# Patient Record
Sex: FEMALE | Race: WHITE | NOT HISPANIC OR LATINO | Employment: OTHER | URBAN - METROPOLITAN AREA
[De-identification: names, ages, dates, MRNs, and addresses within clinical notes are randomized per-mention and may not be internally consistent; named-entity substitution may affect disease eponyms.]

---

## 2017-04-05 ENCOUNTER — GENERIC CONVERSION - ENCOUNTER (OUTPATIENT)
Dept: OTHER | Facility: OTHER | Age: 67
End: 2017-04-05

## 2017-04-28 ENCOUNTER — TRANSCRIBE ORDERS (OUTPATIENT)
Dept: ADMINISTRATIVE | Facility: HOSPITAL | Age: 67
End: 2017-04-28

## 2017-04-28 DIAGNOSIS — Z12.31 VISIT FOR SCREENING MAMMOGRAM: Primary | ICD-10-CM

## 2017-05-12 ENCOUNTER — GENERIC CONVERSION - ENCOUNTER (OUTPATIENT)
Dept: OTHER | Facility: OTHER | Age: 67
End: 2017-05-12

## 2017-05-26 ENCOUNTER — ALLSCRIPTS OFFICE VISIT (OUTPATIENT)
Dept: OTHER | Facility: OTHER | Age: 67
End: 2017-05-26

## 2017-05-26 DIAGNOSIS — Z78.0 ASYMPTOMATIC MENOPAUSAL STATE: ICD-10-CM

## 2017-05-26 DIAGNOSIS — Z12.31 ENCOUNTER FOR SCREENING MAMMOGRAM FOR MALIGNANT NEOPLASM OF BREAST: ICD-10-CM

## 2017-06-08 ENCOUNTER — HOSPITAL ENCOUNTER (OUTPATIENT)
Dept: MAMMOGRAPHY | Facility: CLINIC | Age: 67
Discharge: HOME | End: 2017-06-08
Payer: COMMERCIAL

## 2017-06-08 DIAGNOSIS — Z12.31 ENCOUNTER FOR SCREENING MAMMOGRAM FOR MALIGNANT NEOPLASM OF BREAST: ICD-10-CM

## 2017-06-08 PROCEDURE — G0202 SCR MAMMO BI INCL CAD: HCPCS

## 2017-06-09 ENCOUNTER — GENERIC CONVERSION - ENCOUNTER (OUTPATIENT)
Dept: OTHER | Facility: OTHER | Age: 67
End: 2017-06-09

## 2017-08-14 ENCOUNTER — ALLSCRIPTS OFFICE VISIT (OUTPATIENT)
Dept: OTHER | Facility: OTHER | Age: 67
End: 2017-08-14

## 2017-09-30 ENCOUNTER — ALLSCRIPTS OFFICE VISIT (OUTPATIENT)
Dept: OTHER | Facility: OTHER | Age: 67
End: 2017-09-30

## 2017-10-02 ENCOUNTER — GENERIC CONVERSION - ENCOUNTER (OUTPATIENT)
Dept: OTHER | Facility: OTHER | Age: 67
End: 2017-10-02

## 2017-10-02 NOTE — PROGRESS NOTES
Assessment  1  Cellulitis of hand (682 4) (L03 119)   2  Cat bite, initial encounter (879 8,E906 3) (W55 01XA)   3  Never smoked cigarettes (V49 89) (Z78 9)    Plan  Cat bite, initial encounter    · Amoxicillin-Pot Clavulanate 875-125 MG Oral Tablet; TAKE 1 TABLET EVERY 12  HOURS DAILY    Discussion/Summary    1) cat bite with cellulitis right hand: start augmentin 1 tab twice a dayibuprofen 2 tabs every 4-6 hours as needed for swelling fluidsto ED for iv antibiotics if no improvement or worsening  Possible side effects of new medications were reviewed with the patient/guardian today  The treatment plan was reviewed with the patient/guardian  The patient/guardian understands and agrees with the treatment plan      Chief Complaint  Pt here with cat bite yesterday at 8 am  She washed it out, put alcohol on it, iced it and now it is a little swollen  This time she experienced feverish, aches, chills, extremely tired and hurts in her axillary gland  History of Present Illness  HPI: cat bite yesterday am  cleaned with alcohol and applied ice  did not look bad before going to bed but woke up this am with increased redness and swelling, no drainage  had fevers,ibuprofen last night - 2 tabs , none today  now with streaking up arm and axillary nodes are sore      Review of Systems    Constitutional: fever, but-as noted in HPI-and-no chills  ENT: no ear ache, no loss of hearing, no nosebleeds or nasal discharge, no sore throat or hoarseness  Cardiovascular: no complaints of slow or fast heart rate, no chest pain, no palpitations, no leg claudication or lower extremity edema  Respiratory: no complaints of shortness of breath, no wheezing, no dyspnea on exertion, no orthopnea or PND  Breasts: no complaints of breast pain, breast lump or nipple discharge  Gastrointestinal: no complaints of abdominal pain, no constipation, no nausea or diarrhea, no vomiting, no bloody stools     Genitourinary: no complaints of dysuria, no incontinence, no pelvic pain, no dysmenorrhea, no vaginal discharge or abnormal vaginal bleeding  Musculoskeletal: no complaints of arthralgia, no myalgia, no joint swelling or stiffness, no limb pain or swelling  Integumentary: rash, but-as noted in HPI  Neurological: no complaints of headache, no confusion, no numbness or tingling, no dizziness or fainting  Active Problems  1  Abnormal mammogram (793 80) (R92 8)   2  Anxiety disorder (300 00) (F41 9)   3  Asymptomatic postmenopausal state (V49 81) (Z78 0)   4  Dysthymic disorder (300 4) (F34 1)   5  Glaucoma suspect, unspecified laterality (365 00) (H40 009)   6  Hiatal hernia (553 3) (K44 9)   7  Hyperlipidemia (272 4) (E78 5)   8  Hypothyroidism (244 9) (E03 9)   9  Impaired fasting glucose (790 21) (R73 01)   10  Obstructive sleep apnea (327 23) (G47 33)   11  Other age-related incipient cataract (366 12) (H25 099)   12  Pain of left heel (729 5) (M79 672)   13  Pulmonary nodule seen on imaging study (793 11) (R91 1)   14  Raynaud's syndrome without gangrene (443 0) (I73 00)   15  Reactive airway disease (493 90) (J45 909)   16  Snoring (786 09) (R06 83)   17  Urticaria (708 9) (L50 9)    Past Medical History  1  History of Accelerated essential hypertension (401 0) (I10)   2  History of Bronchitis, asthmatic (493 90) (J45 909)   3  History of Cat bite (879 8,E906 3) (W55 01XA)   4  History of Cough (786 2) (R05)   5  History of Difficulty breathing (786 09) (R06 89)   6  History of Foot Pain (Soft Tissue) (729 5)   7  History of abdominal pain (V13 89) (Z87 898)   8  History of diarrhea (V12 79) (Z87 898)   9  History of labyrinthitis (V12 49) (Z86 69)   10  History of lymphadenopathy (V13 89) (Z87 898)   11  History of pleurisy (V12 69) (Z87 09)   12  History of rheumatic fever (V12 09) (Z86 79)   13  History of Lumbar Strain (847 2)   14  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)   15   History of Pain in joint of right shoulder region (719 41) (M25 511)   16  History of Pain in left foot (729 5) (M84 342)   17  History of Routine Gynecological Exam With Cervical Pap Smear (V72 31)   18  History of Sore throat (462) (J02 9)   19  History of Wound infection (958 3) (T14 8XXA,L08 9)  Active Problems And Past Medical History Reviewed: The active problems and past medical history were reviewed and updated today  Family History  Mother    1  Family history of Lung Cancer (V16 1)   2  Family history of Mother  At Age 64  Father    3  Family history of Carcinoma Of The Stomach (V16 0)   4  Family history of Father  At Age 80  Maternal Grandmother    5  Family history of Stroke Syndrome (V17 1)  Maternal Aunt    6  Family history of Cardiomegaly  Family History    7  Family history of Brother  At Age ___  Family History Reviewed: The family history was reviewed and updated today  Social History   · Alcohol Use (History)   · Social usage, 3-4 beers a wk and 1 cocktail every 2wks  · Daily Coffee Consumption (1  Cups/Day)   · Denied: History of Drug Use   · Never smoked cigarettes (V49 89) (Z78 9)   · Patient has living will (V49 89) (Z78 9)   · Uses Safety Equipment - Seatbelts   · 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  The social history was reviewed and updated today  The social history was reviewed and is unchanged  Surgical History  1  History of Hallux Valgus (Bunion) Correction   2  History of Hand Incision Tendon Sheath Of A Finger   3  History of Knee Arthroscopy With Medial Meniscus Repair  Surgical History Reviewed: The surgical history was reviewed and updated today  Current Meds   1  ALPRAZolam 1 MG Oral Tablet; TAKE ONE-HALF TO ONE TABLET BY MOUTH UP TO 4   TIMES DAILY; Therapy: 21Zlp2621 to (Evaluate:71Sph6466)  Requested for: 90KRJ9868; Last   Rx:57Owf0466 Ordered   2   AmLODIPine Besylate 5 MG Oral Tablet; TAKE ONE (1) TABLET(S) DAILY; Therapy: 39XQW5031 to (Evaluate:21May2018)  Requested for: 81GAY6326; Last   Rx:26May2017 Ordered   3  Aspirin 81 MG Oral Tablet Chewable; CHEW AND SWALLOW 1 TABLET DAILY; Therapy: (Recorded:19Apr2012) to Recorded   4  Citalopram Hydrobromide 40 MG Oral Tablet; take one tablet by mouth daily; Therapy: 33Jzz2372 to (Evaluate:08Oct2017)  Requested for: 02YTS3742; Last   Rx:02Yod5120 Ordered   5  Diphenoxylate-Atropine 2 5-0 025 MG Oral Tablet; TAKE 1 TABLET DAILY AS NEEDED; Therapy: 19Apr2012 to (Evaluate:15Kml4820); Last Rx:26May2017 Ordered   6  Geritol Complete Oral Tablet; TAKE 1 TABLET DAILY AS DIRECTED; Therapy: [phone]) to Recorded   7  Levothyroxine Sodium 25 MCG Oral Tablet; TAKE ONE TABLET BY MOUTH ONCE   DAILY; Therapy: 62HWX9133 to (Evaluate:21May2018)  Requested for: 37OAZ9906; Last   Rx:26May2017 Ordered   8  Omeprazole 20 MG Oral Capsule Delayed Release; TAKE ONE CAPSULE BY MOUTH   EVERY DAY; Therapy: 91VXQ5453 to (Evaluate:16Mar2018)  Requested for: 14XUD4705; Last   Rx:19Jun2017 Ordered   9  Ventolin  (90 Base) MCG/ACT Inhalation Aerosol Solution; INHALE 2 PUFFS BY   MOUTH FOUR TIMES A DAY AS NEEDED FOR SHORTNESS OF BREATH; Therapy: 81GXI3979 to (Teddy Cardona)  Requested for: 27IXN7929; Last   Rx:07Jun2017 Ordered    The medication list was reviewed and updated today  Allergies  1  Pneumovax 23 INJ  2  Other    Vitals   Recorded: 99XZN7267 09:07AM   Temperature 98 8 F   Heart Rate 75   Systolic 966   Diastolic 74   Height 5 ft 2 in   Weight 168 lb    BMI Calculated 30 73   BSA Calculated 1 77   O2 Saturation 98     Physical Exam    Constitutional   General appearance: No acute distress, well appearing and well nourished  Pulmonary   Respiratory effort: No increased work of breathing or signs of respiratory distress  Auscultation of lungs: Clear to auscultation      Cardiovascular   Auscultation of heart: Normal rate and rhythm, normal S1 and S2, without murmurs  Skin   Skin and subcutaneous tissue: Abnormal  -2 puncture wounds right hand volar aspect over 2nd metacarpal and web of thumb- erythema and edema with slight red streak up right forearm  Neurologic   Reflexes: 2+ and symmetric  Additional Exam:  tenderness right axilla          Signatures   Electronically signed by : Farhad Medel DO; Oct  1 2017  9:48PM EST                       (Author)

## 2017-10-12 ENCOUNTER — ALLSCRIPTS OFFICE VISIT (OUTPATIENT)
Dept: OTHER | Facility: OTHER | Age: 67
End: 2017-10-12

## 2017-10-12 DIAGNOSIS — M79.671 PAIN OF RIGHT FOOT: ICD-10-CM

## 2017-10-12 DIAGNOSIS — M79.674 PAIN OF TOE OF RIGHT FOOT: ICD-10-CM

## 2017-10-13 NOTE — PROGRESS NOTES
Assessment  1  Pain of right great toe (729 5) (M79 674)   2  Right foot pain (729 5) (M79 671)    Plan  Pain of right great toe, Right foot pain    · * XR FOOT 3+ VIEW RIGHT; Status:Active; Requested for:12Oct2017; The patient actually has minimal pain but she is concerned because the swelling and bruising moved into her foot  At this point I do still think this is mainly related to the great toe and she can continue to cassy tape for comfort  I am going to give her a prescription for an x-ray, especially to be sure there is no break in the foot as opposed to the toe  We discussed that if there is some thing in the foot she would need to follow up with her podiatrist, she does have a podiatrist that is currently treating her for plantar fasciitis  We discussed that she could either go and get the x-ray now or she could give this a couple days to see if it improves on its own  Discussion/Summary  Possible side effects of new medications were reviewed with the patient/guardian today  The treatment plan was reviewed with the patient/guardian  The patient/guardian understands and agrees with the treatment plan      Chief Complaint  PT C/O R GREAT TOE BEING SWOLLEN AN BLACK AN BLUE ON TUESDAY  THE SWELLING GOES BACK INTO THE FOOT  History of Present Illness  HPI: The patient is here today complaining of pain in her toedropped a can of soda on it yesterday and almost immediately after this happened her dog stepped on it called yesterday to see if we would just recommend cassy taping it and we did at the timecalled again this morning because now it is hurting even morehas turned black and blue it is swollen       Active Problems  1  Abnormal mammogram (793 80) (R92 8)   2  Anxiety disorder (300 00) (F41 9)   3  Asymptomatic postmenopausal state (V49 81) (Z78 0)   4  Cat bite, initial encounter (879 8,E906 3) (W55 01XA)   5  Cellulitis of hand (682 4) (L03 119)   6  Dysthymic disorder (300 4) (F34 1)   7  Glaucoma suspect, unspecified laterality (365 00) (H40 009)   8  Hiatal hernia (553 3) (K44 9)   9  Hyperlipidemia (272 4) (E78 5)   10  Hypothyroidism (244 9) (E03 9)   11  Impaired fasting glucose (790 21) (R73 01)   12  Obstructive sleep apnea (327 23) (G47 33)   13  Other age-related incipient cataract (366 12) (H25 099)   14  Pain of left heel (729 5) (M79 672)   15  Pulmonary nodule seen on imaging study (793 11) (R91 1)   16  Raynaud's syndrome without gangrene (443 0) (I73 00)   17  Reactive airway disease (493 90) (J45 909)   18  Snoring (786 09) (R06 83)   19  Urticaria (708 9) (L50 9)    Past Medical History  1  History of Accelerated essential hypertension (401 0) (I10)   2  History of Bronchitis, asthmatic (493 90) (J45 909)   3  History of Cat bite (879 8,E906 3) (W55 01XA)   4  History of Cough (786 2) (R05)   5  History of Difficulty breathing (786 09) (R06 89)   6  History of Foot Pain (Soft Tissue) (729 5)   7  History of abdominal pain (V13 89) (Z87 898)   8  History of diarrhea (V12 79) (Z87 898)   9  History of labyrinthitis (V12 49) (Z86 69)   10  History of lymphadenopathy (V13 89) (Z87 898)   11  History of pleurisy (V12 69) (Z87 09)   12  History of rheumatic fever (V12 09) (Z86 79)   13  History of Lumbar Strain (847 2)   14  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)   15  History of Pain in joint of right shoulder region (719 41) (M25 511)   16  History of Pain in left foot (729 5) (M79 672)   17  History of Routine Gynecological Exam With Cervical Pap Smear (V72 31)   18  History of Sore throat (462) (J02 9)   19  History of Wound infection (958 3) (T14 8XXA,L08 9)  Active Problems And Past Medical History Reviewed: The active problems and past medical history were reviewed and updated today  Family History  Mother    1  Family history of Lung Cancer (V16 1)   2  Family history of Mother  At Age 64  Father    3   Family history of Carcinoma Of The Stomach (V16 0)   4  Family history of Father  At Age 80  Maternal Grandmother    5  Family history of Stroke Syndrome (V17 1)  Maternal Aunt    6  Family history of Cardiomegaly  Family History    7  Family history of Brother  At Age ___  Family History Reviewed: The family history was reviewed and updated today  Social History   · Alcohol Use (History)   · Social usage, 3-4 beers a wk and 1 cocktail every 2wks  · Daily Coffee Consumption (1  Cups/Day)   · Denied: History of Drug Use   · Never smoked cigarettes (V49 89) (Z78 9)   · Patient has living will (V49 89) (Z78 9)   · Uses Safety Equipment - Seatbelts   · 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  The social history was reviewed and updated today  Surgical History  1  History of Hallux Valgus (Bunion) Correction   2  History of Hand Incision Tendon Sheath Of A Finger   3  History of Knee Arthroscopy With Medial Meniscus Repair  Surgical History Reviewed: The surgical history was reviewed and updated today  Current Meds   1  ALPRAZolam 1 MG Oral Tablet; TAKE ONE-HALF TO ONE TABLET BY MOUTH UP TO 4   TIMES DAILY; Therapy: 97Pvx1695 to (Evaluate:57Hkg8689)  Requested for: 30GFA1902; Last   Rx:14Neg5086 Ordered   2  AmLODIPine Besylate 5 MG Oral Tablet; TAKE ONE (1) TABLET(S) DAILY; Therapy: 47DZM4095 to (Evaluate:72Dcg8172)  Requested for: 48MFD2707; Last   Rx:33Zwl7611 Ordered   3  Amoxicillin-Pot Clavulanate 875-125 MG Oral Tablet; TAKE 1 TABLET EVERY 12   HOURS DAILY; Therapy: 18XQO7894 to (Evaluate:2017); Last Rx:35Dsj6672 Ordered   4  Aspirin 81 MG Oral Tablet Chewable; CHEW AND SWALLOW 1 TABLET DAILY; Therapy: (Recorded:34Rcy6638) to Recorded   5  Citalopram Hydrobromide 40 MG Oral Tablet; take one tablet by mouth daily; Therapy: 03Nhw8957 to (Evaluate:2017)  Requested for: 86BAN9695; Last   Rx:04Fmo1889 Ordered   6   Diphenoxylate-Atropine 2 5-0 025 MG Oral Tablet; TAKE 1 TABLET DAILY AS NEEDED; Therapy: 41Btv3620 to (Evaluate:92Mjw6662); Last Rx:61Kga0670 Ordered   7  Geritol Complete Oral Tablet; TAKE 1 TABLET DAILY AS DIRECTED; Therapy: (Koko Warner) to Recorded   8  Levothyroxine Sodium 25 MCG Oral Tablet; TAKE ONE TABLET BY MOUTH ONCE   DAILY; Therapy: 49UKZ6695 to (Evaluate:04Yxi1786)  Requested for: 58YUX5178; Last   Rx:26May2017 Ordered   9  Omeprazole 20 MG Oral Capsule Delayed Release; TAKE ONE CAPSULE BY MOUTH   EVERY DAY; Therapy: 32YZQ3163 to (Evaluate:16Mar2018)  Requested for: 11ZWH0751; Last   Rx:19Jun2017 Ordered   10  Ventolin  (90 Base) MCG/ACT Inhalation Aerosol Solution; INHALE 2 PUFFS BY    MOUTH FOUR TIMES A DAY AS NEEDED FOR SHORTNESS OF BREATH; Therapy: 25MYW8666 to (Nolon Xu)  Requested for: 18GOT2717; Last    Rx:07Jun2017 Ordered    The medication list was reviewed and updated today  Allergies  1  Pneumovax 23 INJ  2  Other    Vitals   Recorded: 64EFT9118 03:07PM   Temperature 98 5 F   Heart Rate 66   Systolic 340   Diastolic 80   Height 5 ft 2 in   Weight 169 lb 8 oz   BMI Calculated 31   BSA Calculated 1 78   O2 Saturation 96     Physical Exam    Constitutional   General appearance: No acute distress, well appearing and well nourished      Musculoskeletal   Gait and station: Normal     Inspection/palpation of joints, bones, and muscles: Abnormal  -(mild ttp over the right 1st MTP, right great toe and just proximal to the great, 2nd, and 3rd toes on the foot; there is mild edma as well as ecchymosis of these areas as well)   Skin   Skin and subcutaneous tissue: Abnormal  -(as above, eccymosis; also a very small, closed laceration on the right great toe)   Psychiatric   Orientation to person, place, and time: Normal     Mood and affect: Normal          Signatures   Electronically signed by : FLORENTINO Obando ; Oct 12 2017  3:26PM EST                       (Author)

## 2017-10-17 ENCOUNTER — TRANSCRIBE ORDERS (OUTPATIENT)
Dept: ADMINISTRATIVE | Facility: HOSPITAL | Age: 67
End: 2017-10-17

## 2017-10-17 ENCOUNTER — HOSPITAL ENCOUNTER (OUTPATIENT)
Dept: RADIOLOGY | Facility: HOSPITAL | Age: 67
Discharge: HOME | End: 2017-10-17
Attending: FAMILY MEDICINE
Payer: COMMERCIAL

## 2017-10-17 DIAGNOSIS — M79.674 PAIN OF TOE OF RIGHT FOOT: ICD-10-CM

## 2017-10-17 DIAGNOSIS — M79.671 PAIN OF RIGHT FOOT: ICD-10-CM

## 2017-10-17 PROCEDURE — 73630 X-RAY EXAM OF FOOT: CPT

## 2017-10-18 ENCOUNTER — APPOINTMENT (OUTPATIENT)
Dept: RADIOLOGY | Facility: CLINIC | Age: 67
End: 2017-10-18
Payer: COMMERCIAL

## 2017-10-18 ENCOUNTER — TRANSCRIBE ORDERS (OUTPATIENT)
Dept: ADMINISTRATIVE | Facility: HOSPITAL | Age: 67
End: 2017-10-18

## 2017-10-18 DIAGNOSIS — M84.375A STRESS FRACTURE OF LEFT FOOT, INITIAL ENCOUNTER: Primary | ICD-10-CM

## 2017-10-18 DIAGNOSIS — M72.2 BILATERAL PLANTAR FASCIITIS: ICD-10-CM

## 2017-10-18 PROCEDURE — 73650 X-RAY EXAM OF HEEL: CPT

## 2017-10-19 ENCOUNTER — GENERIC CONVERSION - ENCOUNTER (OUTPATIENT)
Dept: OTHER | Facility: OTHER | Age: 67
End: 2017-10-19

## 2017-10-24 ENCOUNTER — HOSPITAL ENCOUNTER (OUTPATIENT)
Dept: MRI IMAGING | Facility: HOSPITAL | Age: 67
Discharge: HOME | End: 2017-10-24
Attending: PODIATRIST
Payer: COMMERCIAL

## 2017-10-24 DIAGNOSIS — M84.375A STRESS FRACTURE OF LEFT FOOT, INITIAL ENCOUNTER: ICD-10-CM

## 2017-10-24 PROCEDURE — 73721 MRI JNT OF LWR EXTRE W/O DYE: CPT
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
## 2018-01-11 NOTE — RESULT NOTES
Verified Results  * MAMMO SCREENING BILATERAL W CAD 13VSN0520 10:16AM Troy Lewis Order Number: [id number]    - Patient Instructions: To schedule this appointment, please contact Central Scheduling at [phone]  Do not wear any perfume, powder, lotion or deodorant on breast or underarm area  Please bring your doctors order, referral (if needed) and insurance information with you on the day of the test  Failure to bring this information may result in this test being rescheduled  Arrive 15 minutes prior to your appointment time to register  On the day of your test, please bring any prior mammogram or breast studies with you that were not performed at a [facility]  Failure to bring prior exams may result in your test needing to be rescheduled  Test Name Result Flag Reference   MAMMO SCREENING BILATERAL W CAD (Report)     Patient History:   Patient is postmenopausal    No known family history of cancer  Took hormonal contraceptives for 8 years beginning at age 19  Patient has never smoked  Patient's BMI is 28 5  Reason for exam: screening, asymptomatic  Mammo Screening Bilateral W CAD: June 8, 2017 - Check In #:    [de-identified]   Bilateral CC and MLO view(s) were taken  Technologist: RONI Arredondo (RONI)(M)   Prior study comparison: June 7, 2016, mammo screening bilateral W   CAD performed at [address] [place] [address]  June 23, 2015, digital bilateral screening mammogram performed at   [address] [place] [address]  June 23, 2015,    left breast unilateral diagnostic mammogram, performed at [address] [place] [address]  October 1, 2014, bilateral OPMA    digital scrn mammo w/CAD, performed at [facility]  October 16, 2013, bilateral OPMA digital   scrn mammo w/CAD, performed at [id number] [place] [address]   October 17, 2012, bilateral OPMA digital scrn    mammo w/CAD, performed at [address]  There are scattered fibroglandular densities  The parenchymal pattern appears stable  No dominant soft tissue    mass or suspicious calcifications are noted  Stable nodular    densities are seen in the left breast  The skin and nipple    contours are within normal limits  No mammographic evidence of malignancy  No    significant changes when compared with prior studies  ACR BI-RADSï¾® Assessments: BiRad:1 - Negative     Recommendation:   Routine screening mammogram in 1 year  A reminder letter will be   scheduled  Analyzed by CAD     8-10% of cancers will be missed on mammography  Management of a    palpable abnormality must be based on clinical grounds  Patients   will be notified of their results via letter from our facility  Accredited by [employer] of Rad[employer] and FDA       Transcription Location: [facility] 98: [id number]     Risk Value(s):   Tyrer-Cuzick 10 Year: 2 200%, Tyrer-Cuzick Lifetime: 4 000%,    Myriad Table: 1 5%, ORA 5 Year: 1 4%, NCI Lifetime: 4 6%   Signed by:   Romeo Carlin MD   6/8/17

## 2018-01-12 VITALS
HEIGHT: 62 IN | BODY MASS INDEX: 30.91 KG/M2 | OXYGEN SATURATION: 98 % | DIASTOLIC BLOOD PRESSURE: 74 MMHG | WEIGHT: 168 LBS | SYSTOLIC BLOOD PRESSURE: 120 MMHG | HEART RATE: 75 BPM | TEMPERATURE: 98.8 F

## 2018-01-13 NOTE — RESULT NOTES
Verified Results  (1) CBC/PLT/DIFF 04Apr2017 10:43AM Kasi Mukund     Test Name Result Flag Reference   WBC 5 5 x10E3/uL  3 4-10 8   RBC 4 19 x10E6/uL  3 77-5 28   Hemoglobin 13 0 g/dL  11 1-15 9   Hematocrit 39 0 %  34 0-46  6   MCV 93 fL  79-97   MCH 31 0 pg  26 6-33 0   MCHC 33 3 g/dL  31 5-35 7   RDW 13 5 %  12 3-15 4   Platelets 321 W77A3/ON  150-379   Neutrophils 43 %     Lymphs 43 %     Monocytes 11 %     Eos 3 %     Basos 0 %     Neutrophils (Absolute) 2 4 x10E3/uL  1 4-7 0   Lymphs (Absolute) 2 4 x10E3/uL  0 7-3 1   Monocytes(Absolute) 0 6 x10E3/uL  0 1-0 9   Eos (Absolute) 0 2 x10E3/uL  0 0-0 4   Baso (Absolute) 0 0 x10E3/uL  0 0-0 2   Immature Granulocytes 0 %     Immature Grans (Abs) 0 0 x10E3/uL  0 0-0 1     (1) COMPREHENSIVE METABOLIC PANEL 27FWZ0263 17:16FV Kasi Avoca     Test Name Result Flag Reference   Glucose, Serum 107 mg/dL H 65-99   BUN 10 mg/dL  8-27   Creatinine, Serum 0 65 mg/dL  0 57-1 00   eGFR If NonAfricn Am 92 mL/min/1 73  >59   eGFR If Africn Am 105 mL/min/1 73  >59   BUN/Creatinine Ratio 15  12-28   **Please note reference interval change**   Sodium, Serum 143 mmol/L  134-144   Potassium, Serum 5 1 mmol/L  3 5-5 2   Chloride, Serum 102 mmol/L     Carbon Dioxide, Total 26 mmol/L  18-29   Calcium, Serum 9 8 mg/dL  8 7-10 3   Protein, Total, Serum 6 7 g/dL  6 0-8 5   Albumin, Serum 4 4 g/dL  3 6-4 8   Globulin, Total 2 3 g/dL  1 5-4 5   A/G Ratio 1 9  1 2-2 2   **Please note reference interval change**   Bilirubin, Total 0 3 mg/dL  0 0-1 2   Alkaline Phosphatase, S 50 IU/L     AST (SGOT) 22 IU/L  0-40   ALT (SGPT) 22 IU/L  0-32     (1) HEMOGLOBIN A1C 04Apr2017 10:43AM Kasi Mukund     Test Name Result Flag Reference   Hemoglobin A1c 6 1 % H 4 8-5 6   Pre-diabetes: 5 7 - 6 4           Diabetes: >6 4           Glycemic control for adults with diabetes: <7 0     (1) LIPID PANEL, FASTING 79HGX9397 10:43AM Kasi Mukund     Test Name Result Flag Reference   Cholesterol, Total 186 mg/dL  100-199   Triglycerides 94 mg/dL  0-149   HDL Cholesterol 58 mg/dL  >39   VLDL Cholesterol Scott 19 mg/dL  5-40   LDL Cholesterol Calc 109 mg/dL H 0-99   T  Chol/HDL Ratio 3 2 ratio units  0 0-4 4   T  Chol/HDL Ratio                                                             Men  Women                                               1/2 Avg  Risk  3 4    3 3                                                   Avg Risk  5 0    4 4                                                2X Avg  Risk  9 6    7 1                                                3X Avg  Risk 23 4   11 0     (1) TSH 04Apr2017 10:43AM Elisa New     Test Name Result Flag Reference   TSH 3 290 uIU/mL  0 450-4 500

## 2018-01-14 VITALS
DIASTOLIC BLOOD PRESSURE: 72 MMHG | BODY MASS INDEX: 30.82 KG/M2 | WEIGHT: 167.5 LBS | SYSTOLIC BLOOD PRESSURE: 126 MMHG | HEART RATE: 82 BPM | TEMPERATURE: 99 F | OXYGEN SATURATION: 97 % | HEIGHT: 62 IN

## 2018-01-14 VITALS
BODY MASS INDEX: 30.82 KG/M2 | OXYGEN SATURATION: 96 % | WEIGHT: 167.5 LBS | SYSTOLIC BLOOD PRESSURE: 124 MMHG | HEART RATE: 74 BPM | HEIGHT: 62 IN | DIASTOLIC BLOOD PRESSURE: 70 MMHG | TEMPERATURE: 99.3 F

## 2018-01-14 VITALS
SYSTOLIC BLOOD PRESSURE: 128 MMHG | TEMPERATURE: 98.5 F | BODY MASS INDEX: 31.19 KG/M2 | OXYGEN SATURATION: 96 % | HEIGHT: 62 IN | HEART RATE: 66 BPM | WEIGHT: 169.5 LBS | DIASTOLIC BLOOD PRESSURE: 80 MMHG

## 2018-01-15 NOTE — PROGRESS NOTES
Assessment    1  Anxiety disorder (300 00) (F41 9)   2  Patient has living will (V49 89) (Z78 9)   3  Special screening for other neurological conditions (V80 09) (Z13 89)   4  Screening for other and unspecified genitourinary condition (V81 6) (Z13 89)   5  Screening for depression (V79 0) (Z13 89)   6  Medicare annual wellness visit, initial (V70 0) (Z00 00)   7  Never smoked cigarettes (V49 89) (Z78 9)    Plan  Anxiety disorder    · ALPRAZolam 1 MG Oral Tablet; TAKE ONE-HALF TO ONE TABLET BY MOUTH  UP TO 4 TIMES DAILY   · Citalopram Hydrobromide 40 MG Oral Tablet; TAKE ONE TABLET BY MOUTH  ONCE DAILY  Encounter for Medicare annual wellness exam    · Brush your teeth {freq1} and floss at least once a day ; Status:Complete;   Done:  84JEO3806   · Drink plenty of fluids ; Status:Complete;   Done: 82TON1742   · Eat foods that are high in calcium ; Status:Complete;   Done: 85WQF5051   · We encourage all of our patients to exercise regularly  30 minutes of exercise or physical  activity five or more days a week is recommended for children and adults ;  Status:Complete;   Done: 09BVH2071   · We recommend routine visits to a dentist ; Status:Complete;   Done: 60UBR2038   · We recommend that you change your eating habits slowly ; Status:Complete;   Done:  90CXB4435   · We recommend that you create an advance directive ; Status:Complete;   Done:  50TLR0526   · Call [phone] if: You find a new or different kind of lump in your breast ;  Status:Complete;   Done: 28GMM4284   · Call [phone] if: You have any bleeding from the vagina ; Status:Complete;   Done:  74FNE9863   · Call [phone] if: You have any warning signs of skin cancer ; Status:Complete;    Done: 15CXP7615   · Call 911 if:  You experience a new kind of chest pain (angina) or pressure ;  Status:Complete;   Done: 66MGC6794  Hiatal hernia    · Omeprazole 20 MG Oral Capsule Delayed Release; TAKE 1 CAPSULE DAILY  Hypothyroidism    · Levothyroxine Sodium 25 MCG Oral Tablet; TAKE ONE TABLET BY MOUTH  ONCE DAILY  Medicare annual wellness visit, subsequent, Screening for depression    · Medicare Annual Wellness Visit; Status:Complete;   Done: 65SCL7433 02:26PM   · Medicare Annual Wellness Visit ; every 1 year; Last 11AAD7182; Next 00AMI8368;  Status:Active  PMH: History of diarrhea    · Diphenoxylate-Atropine 2 5-0 025 MG Oral Tablet (Lomotil); TAKE 1 TABLET  DAILY AS NEEDED  Raynaud's syndrome without gangrene    · AmLODIPine Besylate 5 MG Oral Tablet; TAKE ONE (1) TABLET(S) DAILY  Screening for depression    · *VB-Depression Screening; Status:Complete;   Done: 03DSR4359 02:25PM   · *VB-Depression Screening ; every 1 year; Last 93VLU2614; Next 35YLV9966;  Status:Active  Screening for other and unspecified genitourinary condition, Special screening for other  neurological conditions    · *VB - Urinary Incontinence Screen (Dx Z13 89 Screen for UI); Status:Complete;   Done:  41GNJ8276 02:24PM   · *VB - Urinary Incontinence Screen (Dx Z13 89 Screen for UI) ; every 1 year; Last  11NBO9489; Next 16PJJ9318; Status:Active  Special screening for other neurological conditions    · *VB - Fall Risk Assessment  (Dx Z13 89 Screen for Neurologic Disorder);  Status:Complete;   Done: 73EHD8167 02:23PM   · *VB - Fall Risk Assessment  (Dx Z13 89 Screen for Neurologic Disorder) ; every 1 year; Last 80NVA8082; Next 90OUH8214; Status:Active    Discussion/Summary    1) SCHEDULE MAMMOGRAM  2) CONTINUE CURRENT MEDICATION  3) PREVNAR? HISTORY OF REACTION TO PNEUMOVAX   4) increase exercise - walking daily  Impression: Subsequent Annual Wellness Visit  Cardiovascular screening and counseling: screening is current  Diabetes screening and counseling: screening is current   Immunizations: influenza vaccination is recommended annually, the patient declines the pneumococcal vaccination, hepatitis B vaccination series is not indicated at this time due to the patient's low risk of daniel the disease, the patient declines the Zostavax vaccine, the patient declines the Td vaccine and the patient declines the Tdap vaccine  Advance Directive Planning: complete and up to date  Patient Discussion: plan discussed with the patient  Chief Complaint  PATIENT IS HERE FOR AWV  MAMMOGRAM IS SCHEDULED FOR JUNE 8, 2017  SHE IS DEFERRING DEXA SCAN AT THIS TIME  History of Present Illness  pt is here for a medicare wellness  she is here with her   She denies recent illnesses  she does not exercise  had a reaction to pneumovax in the past  leary to get prevnar  The patient is being seen for the initial annual wellness visit  Medicare Screening and Risk Factors   Hospitalizations: no previous hospitalizations  Once per lifetime medicare screening tests: ECG has not been done and AAA screening US has not yet been done  Medicare Screening Tests Risk Questions   Abdominal aortic aneurysm risk assessment: none indicated  Osteoporosis risk assessment: , female gender, over 48years of age and alcohol use  HIV risk assessment: none indicated  Drug and Alcohol Use: The patient has never smoked cigarettes  The patient reports drinking 1-2 TWICE A WEEK drinks per week  She has never used illicit drugs  Diet and Physical Activity: Current diet includes limited junk food, 1 servings of fruit per day, 2 servings of vegetables per day, 0-1 servings of meat per day, 2 servings of whole grains per day, 2 servings of simple carbohydrates per day, 1 cups of coffee per day, 0 cups of tea per day, 0 cans of regular soda per day, 0 cans of diet soda per day and DRINKS 6-8 GLASSES OF WATER PER DAY  She exercises infrequently  Exercise: walking  Mood Disorder and Cognitive Impairment Screening: She reports feeling down, depressed, or hopeless over the past two weeks  She reports feeling little interest or pleasure in doing things over the past two weeks     Cognitive impairment screening: denies difficulty learning/retaining new information, denies difficulty handling complex tasks, denies difficulty with reasoning, denies difficulty with spatial ability and orientation, denies difficulty with language and denies difficulty with behavior  Functional Ability/Level of Safety: Hearing is normal bilaterally and a hearing aid is not used  She denies hearing difficulties  The patient is currently able to drive with limitations, but able to do activities of daily living without limitations, able to do instrumental activities of daily living without limitations and able to participate in social activities without limitations  Activities of daily living details: does not need help using the phone, no transportation help needed, does not need help shopping, no meal preparation help needed, does not need help doing housework, does not need help doing laundry, does not need help managing medications and does not need help managing money  Home safety risk factors:  no unfamiliar surroundings, no loose rugs, no poor household lighting, no uneven floors, no household clutter, grab bars in the bathroom and handrails on the stairs  Advance Directives: Advance directives: living will, durable power of  for health care directives and advance directives  end of life decisions were reviewed with the patient and I agree with the patient's decisions  Co-Managers and Medical Equipment/Suppliers: See Patient Care Team      Patient Care Team    Care Team Member Role Specialty Office Number   Ellen Can DO Specialist Pulmonary Medicine [phone]         Kylie Davidson, [address] [facility] [phone]   Lexa Fisher MD  Ophthalmology [phone]     Review of Systems    Constitutional: negative  Head and Face: negative  Eyes: negative  ENT: negative  Cardiovascular: negative  Respiratory: negative  Gastrointestinal: negative  Genitourinary: negative     Musculoskeletal: negative  Integumentary and Breasts: negative  Neurological: negative  Psychiatric: negative  Endocrine: negative  Hematologic and Lymphatic: negative  Active Problems    1  Abnormal mammogram (793 80) (R92 8)   2  Anxiety disorder (300 00) (F41 9)   3  Asymptomatic postmenopausal state (V49 81) (Z78 0)   4  Colonoscopy (Fiberoptic) Screening   5  Dysthymic disorder (300 4) (F34 1)   6  Encounter for screening for osteoporosis (V82 81) (Z13 820)   7  Encounter for screening mammogram for malignant neoplasm of breast (V76 12)   (Z12 31)   8  Glaucoma suspect, unspecified laterality (365 00) (H40 009)   9  Hiatal hernia (553 3) (K44 9)   10  Hyperlipidemia (272 4) (E78 5)   11  Hypothyroidism (244 9) (E03 9)   12  Impaired fasting glucose (790 21) (R73 01)   13  Need for influenza vaccination (V04 81) (Z23)   14  Obstructive sleep apnea (327 23) (G47 33)   15  Other age-related incipient cataract (366 12) (H25 099)   16  Pulmonary function studies abnormal (794 2) (R94 2)   17  Pulmonary nodule seen on imaging study (793 11) (R91 1)   18  Raynaud's syndrome without gangrene (443 0) (I73 00)   19  Snoring (786 09) (R06 83)    Past Medical History    1  History of Accelerated essential hypertension (401 0) (I10)   2  History of Bronchitis, asthmatic (493 90) (J45 909)   3  History of Cat bite (879 8,E906 3) (W55 01XA)   4  History of Cough (786 2) (R05)   5  History of Difficulty breathing (786 09) (R06 89)   6  History of Foot Pain (Soft Tissue) (729 5)   7  History of abdominal pain (V13 89) (Z87 898)   8  History of diarrhea (V12 79) (Z87 898)   9  History of labyrinthitis (V12 49) (Z86 69)   10  History of lymphadenopathy (V13 89) (Z87 898)   11  History of pleurisy (V12 69) (Z87 09)   12  History of rheumatic fever (V12 09) (Z86 79)   13  History of Lumbar Strain (847 2)   14  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)   15   History of Pain in joint of right shoulder region (719 41) (M25 511)   16  History of Pain in left foot (729 5) (M22 682)   17  History of Routine Gynecological Exam With Cervical Pap Smear (V72 31)   18  History of Sore throat (462) (J02 9)   19  History of Wound infection (958 3) (T14 8,L08 9)    The active problems and past medical history were reviewed and updated today  Surgical History    1  History of Hallux Valgus (Bunion) Correction   2  History of Hand Incision Tendon Sheath Of A Finger   3  History of Knee Arthroscopy With Medial Meniscus Repair    The surgical history was reviewed and updated today  Family History  Mother    1  Family history of Lung Cancer (V16 1)   2  Family history of Mother  At Age 64  Father    3  Family history of Carcinoma Of The Stomach (V16 0)   4  Family history of Father  At Age 80  Maternal Grandmother    5  Family history of Stroke Syndrome (V17 1)  Maternal Aunt    6  Family history of Cardiomegaly  Family History    7  Family history of Brother  At Age ___    The family history was reviewed and updated today  Social History    · Alcohol Use (History)   · Daily Coffee Consumption (1  Cups/Day)   · Denied: History of Drug Use   · Never smoked cigarettes (V49 89) (Z78 9)   · Uses Safety Equipment - Seatbelts   · Working Part-time  The social history was reviewed and updated today  The social history was reviewed and is unchanged  Current Meds   1  ALPRAZolam 1 MG Oral Tablet; TAKE ONE-HALF TO ONE TABLET BY MOUTH UP TO 4   TIMES DAILY; Therapy: 33Jya1436 to (Evaluate:2016)  Requested for: 2016; Last   Rx:2016 Ordered   2  AmLODIPine Besylate 5 MG Oral Tablet; TAKE ONE (1) TABLET(S) DAILY; Therapy:  to (Evaluate:77Zqv2900)  Requested for: 2016; Last   Rx:2015 Ordered   3  Aspirin 81 MG Oral Tablet Chewable; CHEW AND SWALLOW 1 TABLET DAILY; Therapy: (Recorded:2012) to Recorded   4   Citalopram Hydrobromide 40 MG Oral Tablet; TAKE ONE TABLET BY MOUTH ONCE   DAILY; Therapy: 21Apr2014 to (Evaluate:22Mar2018)  Requested for: 27Mar2017; Last   Rx:27Mar2017 Ordered   5  Geritol Complete Oral Tablet; TAKE 1 TABLET DAILY AS DIRECTED; Therapy: [phone]) to Recorded   6  Glucosamine Chondroitin Joint Oral Tablet; Therapy: (Prince Cifuentes) to Recorded   7  Levothyroxine Sodium 25 MCG Oral Tablet; TAKE ONE TABLET BY MOUTH ONCE DAILY; Therapy: 13HRV1740 to (Evaluate:16Mar2017)  Requested for: 13MLY6775; Last   Rx:74Fme1709 Ordered   8  Omeprazole 20 MG Oral Capsule Delayed Release; TAKE 1 CAPSULE DAILY; Last   Rx:19Oct2016 Ordered    The medication list was reviewed and updated today  Allergies    1  Pneumovax 23 INJ    2   Other    Immunizations  Hepatitis B --- Saeid Poole: [id number] Lluvia: 09-Feb-1999; Series3: 10-Aug-1999   Influenza --- Saeid Poole: 15-Oct-2014; [facility] Room: 27-Oct-2015; Stefani Cohen: 14-Sep-2016; Series4:  01-Oct-2013; Series5: 59BCT3501/YKLO   PCV --- Saeid Poole: Permanently Deferred: Medical Deferral, Barrie Ion   Pneumo Other --- Series1: 2000   Tdap --- Series1: 2010   Zoster --- Series1: 02-Feb-2015     Vitals  Signs   Recorded: 87XEK5183 01:18PM   Temperature: 99 3 F  Heart Rate: 74  Systolic: 686  Diastolic: 70  Height: 5 ft 2 in  Weight: 167 lb 8 oz  BMI Calculated: 30 64  BSA Calculated: 1 77  O2 Saturation: 96    Results/Data  Medicare Annual Wellness Visit 77GMV7873 02:26PM Jaimee Emperor     Test Name Result Flag Reference   MEDICARE [place] VISIT 03ZEI4774       *VB-Depression Screening 11RFU8695 02:25PM Jaimee Emperor     Test Name Result Flag Reference   Depression Scale Result      Depression Screen - Negative For Symptoms     *VB - Urinary Incontinence Screen (Dx Z13 89 Screen for UI) 81HVG8069 02:24PM Jaimee Emperor     Test Name Result Flag Reference   Urinary Incontinence Assessment 89WBH7521       *VB - Fall Risk Assessment  (Dx Z13 89 Screen for Neurologic Disorder) 83WVG9682 02:23PM Jaimee Paulino Test Name Result Flag Reference   Falls Risk      No falls in the past year       Health Management  Colonoscopy (Fiberoptic) Screening   COLONOSCOPY; every 10 years; Last 66Pjj6820; Next Due: 23Qjq7363; Active  Encounter for screening mammogram for malignant neoplasm of breast   Digital Bilateral Screening Mammogram With CAD; every 1 year; Last 87PMK9320; Next Due:  20QAL7728;  Overdue    Signatures   Electronically signed by : Tae Álvarez DO; May 26 2017 11:25PM EST                       (Author)

## 2018-01-18 NOTE — RESULT NOTES
Discussion/Summary   Please let the patient know that she has a bone spur on her heel but no fracture  If she does have heel pain she should see a podiatrist   I do not think this is why we did the x-ray, though  Verified Results  * XR FOOT 3+ VIEW RIGHT 17Oct2017 11:40AM Mg Morales Order Number: [id number]     Test Name Result Flag Reference   XR FOOT 3+ VW RIGHT (Report)     RIGHT FOOT     INDICATION: 78-year-old female, posttraumatic dorsal right foot pain     COMPARISON: None     VIEWS: 3     IMAGES: 3     FINDINGS:   Small plantar calcaneal heel spur     There is no acute fracture or dislocation  No degenerative changes  No lytic or blastic lesions are seen  Soft tissues are unremarkable  IMPRESSION:   Small heel spur   No acute osseous abnormality         Workstation performed: [id number]     Signed by:   Cleopatra Nava MD   10/19/17

## 2018-01-29 ENCOUNTER — TELEPHONE (OUTPATIENT)
Dept: FAMILY MEDICINE CLINIC | Facility: CLINIC | Age: 68
End: 2018-01-29

## 2018-01-29 DIAGNOSIS — E03.9 ACQUIRED HYPOTHYROIDISM: Primary | ICD-10-CM

## 2018-01-29 PROBLEM — J45.909 REACTIVE AIRWAY DISEASE: Status: ACTIVE | Noted: 2017-06-07

## 2018-01-29 RX ORDER — NICOTINE POLACRILEX 4 MG/1
1 GUM, CHEWING ORAL DAILY
COMMUNITY
Start: 2017-06-19 | End: 2018-09-12 | Stop reason: DRUGHIGH

## 2018-01-29 RX ORDER — ALBUTEROL SULFATE 90 UG/1
2 AEROSOL, METERED RESPIRATORY (INHALATION) AS NEEDED
COMMUNITY
Start: 2017-06-07 | End: 2018-11-12 | Stop reason: CLARIF

## 2018-01-29 RX ORDER — LEVOTHYROXINE SODIUM 0.03 MG/1
25 TABLET ORAL DAILY
Qty: 30 TABLET | Refills: 2 | Status: SHIPPED | OUTPATIENT
Start: 2018-01-29 | End: 2019-02-21 | Stop reason: SDUPTHER

## 2018-01-29 RX ORDER — DIPHENOXYLATE HYDROCHLORIDE AND ATROPINE SULFATE 2.5; .025 MG/1; MG/1
1 TABLET ORAL DAILY PRN
COMMUNITY
Start: 2012-04-19 | End: 2022-05-04 | Stop reason: SDUPTHER

## 2018-01-29 RX ORDER — ASPIRIN 81 MG/1
1 TABLET, CHEWABLE ORAL DAILY
COMMUNITY

## 2018-01-29 RX ORDER — LEVOTHYROXINE SODIUM 0.03 MG/1
25 TABLET ORAL DAILY
Qty: 30 TABLET | Refills: 2 | Status: SHIPPED | OUTPATIENT
Start: 2018-01-29 | End: 2018-01-29 | Stop reason: SDUPTHER

## 2018-01-29 RX ORDER — AMLODIPINE BESYLATE 5 MG/1
1 TABLET ORAL DAILY
COMMUNITY
Start: 2014-02-28 | End: 2018-08-06 | Stop reason: SDUPTHER

## 2018-01-29 RX ORDER — CITALOPRAM 40 MG/1
1 TABLET ORAL DAILY
COMMUNITY
Start: 2014-04-21 | End: 2018-07-19 | Stop reason: SDUPTHER

## 2018-01-29 RX ORDER — LEVOTHYROXINE SODIUM 0.03 MG/1
1 TABLET ORAL DAILY
COMMUNITY
Start: 2012-06-26 | End: 2018-01-29 | Stop reason: SDUPTHER

## 2018-01-29 RX ORDER — ALPRAZOLAM 1 MG/1
1 TABLET ORAL 4 TIMES DAILY PRN
COMMUNITY
Start: 2016-08-30 | End: 2018-07-19 | Stop reason: SDUPTHER

## 2018-04-30 ENCOUNTER — TRANSCRIBE ORDERS (OUTPATIENT)
Dept: ADMINISTRATIVE | Facility: HOSPITAL | Age: 68
End: 2018-04-30

## 2018-04-30 DIAGNOSIS — Z12.31 ENCOUNTER FOR SCREENING MAMMOGRAM FOR MALIGNANT NEOPLASM OF BREAST: Primary | ICD-10-CM

## 2018-04-30 DIAGNOSIS — Z12.39 SCREENING BREAST EXAMINATION: Primary | ICD-10-CM

## 2018-05-14 ENCOUNTER — TELEPHONE (OUTPATIENT)
Dept: FAMILY MEDICINE CLINIC | Facility: CLINIC | Age: 68
End: 2018-05-14

## 2018-05-14 NOTE — TELEPHONE ENCOUNTER
PT IS WONDERING IF IT IS SAFE FOR HER TO GET THE SHINGRIX VACCINE IF SHE IS ALLERGIC TO THE PNEUMOVAX?     PLEASE ADVISE PT .[phone]

## 2018-06-08 DIAGNOSIS — Z12.39 BREAST CANCER SCREENING: Primary | ICD-10-CM

## 2018-06-12 ENCOUNTER — HOSPITAL ENCOUNTER (OUTPATIENT)
Dept: MAMMOGRAPHY | Facility: CLINIC | Age: 68
Discharge: HOME | End: 2018-06-12
Payer: COMMERCIAL

## 2018-06-12 ENCOUNTER — TELEPHONE (OUTPATIENT)
Dept: FAMILY MEDICINE CLINIC | Facility: CLINIC | Age: 68
End: 2018-06-12

## 2018-06-12 DIAGNOSIS — Z12.39 BREAST CANCER SCREENING: ICD-10-CM

## 2018-06-12 DIAGNOSIS — Z12.31 ENCOUNTER FOR SCREENING MAMMOGRAM FOR MALIGNANT NEOPLASM OF BREAST: ICD-10-CM

## 2018-06-12 DIAGNOSIS — Z12.39 BREAST CANCER SCREENING: Primary | ICD-10-CM

## 2018-06-12 PROCEDURE — 77067 SCR MAMMO BI INCL CAD: CPT

## 2018-06-26 DIAGNOSIS — K21.9 GASTROESOPHAGEAL REFLUX DISEASE WITHOUT ESOPHAGITIS: Primary | ICD-10-CM

## 2018-06-27 RX ORDER — OMEPRAZOLE 20 MG/1
CAPSULE, DELAYED RELEASE ORAL
Qty: 90 CAPSULE | Refills: 0 | Status: SHIPPED | OUTPATIENT
Start: 2018-06-27 | End: 2018-09-05 | Stop reason: SDUPTHER

## 2018-07-03 ENCOUNTER — OFFICE VISIT (OUTPATIENT)
Dept: FAMILY MEDICINE CLINIC | Facility: CLINIC | Age: 68
End: 2018-07-03
Payer: COMMERCIAL

## 2018-07-03 ENCOUNTER — TELEPHONE (OUTPATIENT)
Dept: FAMILY MEDICINE CLINIC | Facility: CLINIC | Age: 68
End: 2018-07-03

## 2018-07-03 VITALS
OXYGEN SATURATION: 96 % | HEIGHT: 62 IN | WEIGHT: 170.75 LBS | BODY MASS INDEX: 31.42 KG/M2 | SYSTOLIC BLOOD PRESSURE: 136 MMHG | HEART RATE: 83 BPM | TEMPERATURE: 98.1 F | DIASTOLIC BLOOD PRESSURE: 70 MMHG

## 2018-07-03 DIAGNOSIS — J40 BRONCHITIS: Primary | ICD-10-CM

## 2018-07-03 PROCEDURE — 99213 OFFICE O/P EST LOW 20 MIN: CPT | Performed by: FAMILY MEDICINE

## 2018-07-03 RX ORDER — PENICILLIN V POTASSIUM 500 MG/1
TABLET ORAL
Refills: 2 | COMMUNITY
Start: 2018-04-30 | End: 2019-01-29 | Stop reason: ALTCHOICE

## 2018-07-03 RX ORDER — DIPHENOXYLATE HYDROCHLORIDE AND ATROPINE SULFATE 2.5; .025 MG/1; MG/1
1 TABLET ORAL DAILY
COMMUNITY
End: 2018-08-17 | Stop reason: CLARIF

## 2018-07-03 RX ORDER — AZITHROMYCIN 250 MG/1
TABLET, FILM COATED ORAL
Qty: 6 TABLET | Refills: 0 | Status: SHIPPED | OUTPATIENT
Start: 2018-07-03 | End: 2018-07-08

## 2018-07-03 NOTE — TELEPHONE ENCOUNTER
RX SENT TO WRONG PHARM, WANTED [facility], CALLED IN  THERE AND CANCELLED AT [facility]

## 2018-07-04 NOTE — PROGRESS NOTES
Assessment/Plan:      Diagnoses and all orders for this visit:    Bronchitis  -     azithromycin (ZITHROMAX) 250 mg tablet; 2 tabs today then 1 tab daily for 4 days    Other orders  -     penicillin V potassium (VEETID) 500 mg tablet; TAKE 4 TABLETS BY MOUTH ONE HOUR prior to appointment  -     [address] [place] [address] SUSR; Use as directed  -     multivitamin (THERAGRAN) TABS; Take 1 tablet by mouth daily        Bronchitis:  Patient symptoms for aggressive so will start azithromycin 2 tablets today then 1 tablet daily for 4 days  She will observe for improvement in cough and chest congestion  She will increase fluids  She will use cough medicine as needed  Subjective:  Chief Complaint   Patient presents with   Lno  Like Symptoms     for the last 6 days pt started with a itchy cough that is moving to her chest, sinusis,& a little SOB,  pt had asthma bronchitis twice so she wanted to get checked        Patient ID: Naima Carey is a 69 y o  female  Patient is here with her  today  She complains of an ongoing cough that is loose  The symptoms have been ongoing for a week and for getting worse rather than better  She complains of some tightness in her chest   She has nasal drainage and head congestion  She has been feeling more tired than usual         Review of Systems   Constitutional: Negative for fatigue and fever  HENT: Positive for congestion, postnasal drip and sore throat  Eyes: Negative  Respiratory: Positive for cough  Cardiovascular: Negative  Gastrointestinal: Negative  Endocrine: Negative  Genitourinary: Negative  Musculoskeletal: Negative  Skin: Negative  Allergic/Immunologic: Negative  Neurological: Negative  Psychiatric/Behavioral: Negative            The following portions of the patient's history were reviewed and updated as appropriate: allergies, current medications, past family history, past medical history, past social history, past surgical history and problem list     Objective:  Vitals:    07/03/18 1446   BP: 136/70   Pulse: 83   Temp: 98 1 °F (36 7 °C)   SpO2: 96%   Weight: 77 5 kg (170 lb 12 oz)   Height: 5' 2" (1 575 m)      Physical Exam   Constitutional: She is oriented to person, place, and time  She appears well-developed and well-nourished  HENT:   Head: Normocephalic and atraumatic  Nasal congestion, drainage posterior pharynx   Cardiovascular: Normal rate, regular rhythm and normal heart sounds  Pulmonary/Chest: Effort normal and breath sounds normal  No respiratory distress  Rhonchi with cough   Abdominal: Soft  Bowel sounds are normal    Neurological: She is alert and oriented to person, place, and time  Skin: Skin is warm and dry  Psychiatric: She has a normal mood and affect  Her behavior is normal  Judgment and thought content normal    Nursing note and vitals reviewed

## 2018-07-17 ENCOUNTER — OFFICE VISIT (OUTPATIENT)
Dept: FAMILY MEDICINE CLINIC | Facility: CLINIC | Age: 68
End: 2018-07-17
Payer: COMMERCIAL

## 2018-07-17 VITALS
TEMPERATURE: 99.2 F | SYSTOLIC BLOOD PRESSURE: 144 MMHG | OXYGEN SATURATION: 94 % | HEIGHT: 62 IN | WEIGHT: 170 LBS | DIASTOLIC BLOOD PRESSURE: 72 MMHG | HEART RATE: 72 BPM | BODY MASS INDEX: 31.28 KG/M2

## 2018-07-17 DIAGNOSIS — K44.9 HIATAL HERNIA: ICD-10-CM

## 2018-07-17 DIAGNOSIS — R14.2 BURPING: Primary | ICD-10-CM

## 2018-07-17 PROCEDURE — 1101F PT FALLS ASSESS-DOCD LE1/YR: CPT | Performed by: FAMILY MEDICINE

## 2018-07-17 PROCEDURE — 99213 OFFICE O/P EST LOW 20 MIN: CPT | Performed by: FAMILY MEDICINE

## 2018-07-17 RX ORDER — RANITIDINE HCL 75 MG
75 TABLET ORAL
Qty: 30 TABLET | Refills: 0 | Status: SHIPPED | OUTPATIENT
Start: 2018-07-17 | End: 2018-08-17 | Stop reason: ALTCHOICE

## 2018-07-17 NOTE — PATIENT INSTRUCTIONS
Dr Chakraborty Sensor -for evaluation for EGD  Trial of omeprazole 1 tab twice a day   Or omeprazole 1 tab in am and zantac in pm  Try simethicone with meals, 3 times a day

## 2018-07-17 NOTE — PROGRESS NOTES
Assessment/Plan:      Diagnoses and all orders for this visit:    Burping    Hiatal hernia  -     Ambulatory referral to Gastroenterology; Future  -     ranitidine (ZANTAC) 75 MG tablet; Take 1 tablet (75 mg total) by mouth daily at bedtime        Frequent burping:  Schedule evaluation with GI for repeat EGD  Increase omeprazole over the next few days to 1 tablet twice a day or omeprazole in the morning and Zantac in the evening and observe for any improvement in symptoms  Continue with a bland diet  Call if fevers, abdominal pain-right upper quadrant, nausea or vomiting  Subjective:  Chief Complaint   Patient presents with    burping     pt states no matter what she eat she is always burping, pt states she can't sleep at night,  pt states she has a hiatal hernia         Patient ID: Ann Canas is a 69 y o  female  Patient is here with her  today  She has had frequent burping although over the past few weeks has been more significant  She burps throughout the day  She tries to stick to a bland foods but she still continues to burp even with plan foods  She denies any nausea or vomiting  She denies abdominal pain  She has no changes in her bowel movements  She had an EGD in the past  in 2012  She has a sliding hiatal hernia and was put on omeprazole at that time  She denies any heartburn symptoms  Patient has also had a HIDA scan and an ultrasound of her abdomen in the past           Review of Systems   Constitutional: Negative  Negative for fatigue and fever  HENT: Negative  Eyes: Negative  Respiratory: Negative  Negative for cough  Cardiovascular: Negative  Gastrointestinal: Negative for abdominal pain, nausea and vomiting  Burping  Early satiety   Endocrine: Negative  Musculoskeletal: Negative  Skin: Negative  Allergic/Immunologic: Negative  Neurological: Negative  Psychiatric/Behavioral: Negative            The following portions of the patient's history were reviewed and updated as appropriate: allergies, current medications, past family history, past medical history, past social history, past surgical history and problem list     Objective:  Vitals:    07/17/18 1423   BP: 144/72   Pulse: 72   Temp: 99 2 °F (37 3 °C)   SpO2: 94%   Weight: 77 1 kg (170 lb)   Height: 5' 2" (1 575 m)      Physical Exam   Constitutional: She is oriented to person, place, and time  She appears well-developed and well-nourished  HENT:   Head: Normocephalic and atraumatic  Cardiovascular: Normal rate, regular rhythm and normal heart sounds  Pulmonary/Chest: Effort normal and breath sounds normal    Abdominal: Soft  Bowel sounds are normal    Neurological: She is alert and oriented to person, place, and time  Skin: Skin is warm and dry  Psychiatric: She has a normal mood and affect  Her behavior is normal  Judgment and thought content normal    Nursing note and vitals reviewed

## 2018-07-19 DIAGNOSIS — F41.9 ANXIETY: Primary | ICD-10-CM

## 2018-07-20 RX ORDER — CITALOPRAM 40 MG/1
40 TABLET ORAL DAILY
Qty: 30 TABLET | Refills: 5 | Status: SHIPPED | OUTPATIENT
Start: 2018-07-20 | End: 2019-10-02 | Stop reason: SDUPTHER

## 2018-07-20 RX ORDER — CITALOPRAM 40 MG/1
TABLET ORAL
Qty: 90 TABLET | Refills: 2 | Status: SHIPPED | OUTPATIENT
Start: 2018-07-20 | End: 2018-08-17 | Stop reason: SDUPTHER

## 2018-07-20 RX ORDER — ALPRAZOLAM 1 MG/1
1 TABLET ORAL 3 TIMES DAILY PRN
Qty: 90 TABLET | Refills: 0 | Status: SHIPPED | OUTPATIENT
Start: 2018-07-20 | End: 2020-06-10 | Stop reason: ALTCHOICE

## 2018-07-20 RX ORDER — ALPRAZOLAM 1 MG/1
TABLET ORAL
Qty: 90 TABLET | Refills: 0 | Status: SHIPPED | OUTPATIENT
Start: 2018-07-20 | End: 2018-08-17 | Stop reason: SDUPTHER

## 2018-08-06 DIAGNOSIS — I10 ESSENTIAL HYPERTENSION: Primary | ICD-10-CM

## 2018-08-06 RX ORDER — AMLODIPINE BESYLATE 5 MG/1
TABLET ORAL
Qty: 90 TABLET | Refills: 2 | Status: SHIPPED | OUTPATIENT
Start: 2018-08-06 | End: 2019-05-22 | Stop reason: SDUPTHER

## 2018-08-22 ENCOUNTER — HOSPITAL ENCOUNTER (OUTPATIENT)
Facility: HOSPITAL | Age: 68
Setting detail: OUTPATIENT SURGERY
Discharge: HOME | End: 2018-08-22
Attending: INTERNAL MEDICINE | Admitting: INTERNAL MEDICINE
Payer: COMMERCIAL

## 2018-08-22 ENCOUNTER — ANESTHESIA (OUTPATIENT)
Dept: PERIOP | Facility: HOSPITAL | Age: 68
End: 2018-08-22
Payer: COMMERCIAL

## 2018-08-22 ENCOUNTER — ANESTHESIA EVENT (OUTPATIENT)
Dept: PERIOP | Facility: HOSPITAL | Age: 68
End: 2018-08-22
Payer: COMMERCIAL

## 2018-08-22 VITALS
OXYGEN SATURATION: 95 % | DIASTOLIC BLOOD PRESSURE: 66 MMHG | HEIGHT: 62 IN | WEIGHT: 170 LBS | RESPIRATION RATE: 19 BRPM | BODY MASS INDEX: 31.28 KG/M2 | SYSTOLIC BLOOD PRESSURE: 162 MMHG | HEART RATE: 53 BPM | TEMPERATURE: 99 F

## 2018-08-22 RX ORDER — SODIUM CHLORIDE 9 MG/ML
40 INJECTION, SOLUTION INTRAVENOUS CONTINUOUS
Status: DISCONTINUED | OUTPATIENT
Start: 2018-08-22 | End: 2018-08-22 | Stop reason: HOSPADM

## 2018-08-22 RX ORDER — SODIUM CHLORIDE 9 MG/ML
125 INJECTION, SOLUTION INTRAVENOUS CONTINUOUS
Status: DISCONTINUED | OUTPATIENT
Start: 2018-08-22 | End: 2018-08-22 | Stop reason: HOSPADM

## 2018-08-22 RX ORDER — PROPOFOL 10 MG/ML
INJECTION, EMULSION INTRAVENOUS AS NEEDED
Status: DISCONTINUED | OUTPATIENT
Start: 2018-08-22 | End: 2018-08-22 | Stop reason: SURG

## 2018-08-22 RX ADMIN — PROPOFOL 70 MG: 10 INJECTION, EMULSION INTRAVENOUS at 12:01

## 2018-08-22 RX ADMIN — SODIUM CHLORIDE: 0.9 INJECTION, SOLUTION INTRAVENOUS at 11:49

## 2018-08-22 RX ADMIN — PROPOFOL 30 MG: 10 INJECTION, EMULSION INTRAVENOUS at 12:02

## 2018-08-22 RX ADMIN — SODIUM CHLORIDE 40 ML/HR: 0.9 INJECTION, SOLUTION INTRAVENOUS at 10:30

## 2018-08-22 NOTE — OP NOTE
**** GI/ENDOSCOPY REPORT ****     PATIENT NAME: Samanhta Brewster - VISIT ID:  Patient ID: [id number]   YOB: 1950     INTRODUCTION: Esophagogastroduodenoscopy - A 69 female patient presents   for an outpatient Esophagogastroduodenoscopy at [facility]  INDICATIONS: GERD  Excessive belching  CONSENT: The benefits, risks, and alternatives to the procedure were   discussed and informed consent was obtained from the patient  PREPARATION:  EKG, pulse, pulse oximetry and blood pressure were monitored   throughout the procedure  MEDICATIONS:     PROCEDURE:  The endoscope was passed without difficulty through the mouth   under direct visualization and advanced to the 2nd portion of the   duodenum  The scope was withdrawn and the mucosa was carefully examined  FINDINGS:   Esophagus: There was a 3 cm and medium-sized hiatus hernia   visible in the esophagus, 34 cm from the entry site  The esophagus   appeared to be normal  Normal mucosa - whole esophagus  Stomach: Patchy   possibly erythematous mucosa was found in the antrum  Very minor changes   - no biopsy taken  Duodenum: The duodenal bulb and 2nd portion of the   duodenum appeared to be normal      COMPLICATIONS: There were no complications  IMPRESSIONS: A hiatus hernia found  Normal esophagus  Normal mucosa -   whole esophagus  Possibly erythematous mucosa found in the antrum  Very   minor changes - no biopsy taken  Normal duodenal bulb and 2nd portion of   the duodenum  RECOMMENDATIONS: Discharge home when standard parameters are met  Anti-reflux measures: Raise the head of the bed 4 to 6 inches  Avoid   smoking  Avoid excess coffee, tea or other caffeinated beverages  Avoid   garments that fit tightly through the abdomen  Avoid eating before bed  Resume regular diet as tolerated  Continue current medications  Follow-up   appointment with endoscopist in 3 months as needed month   Gradual weight loss - continue anti-reflux diet  ESTIMATED BLOOD LOSS: None  PATHOLOGY SPECIMENS:     PROCEDURE CODES: 58380 - EGD flexible; incl brushing or washing     ICD-9 Codes: 530 81 Esophageal reflux 553 3 Diaphragmatic hernia without   mention of obstruction or gangrene     ICD-10 Codes: K21 Gastro-esophageal reflux disease K44 Diaphragmatic   hernia R19 8 Other specified symptoms and signs involving the digestive   system and abdomen     PERFORMED BY: FLORENTINO Jackson  on 08/22/2018  The procedure was   performed by Dr Colletta Fanny  Version 1, electronically signed by FLORENTINO Kim  on 08/22/2018   at 12:17

## 2018-09-05 ENCOUNTER — OFFICE VISIT (OUTPATIENT)
Dept: FAMILY MEDICINE CLINIC | Facility: CLINIC | Age: 68
End: 2018-09-05
Payer: COMMERCIAL

## 2018-09-05 VITALS
TEMPERATURE: 98.3 F | HEIGHT: 62 IN | WEIGHT: 170 LBS | SYSTOLIC BLOOD PRESSURE: 130 MMHG | HEART RATE: 77 BPM | DIASTOLIC BLOOD PRESSURE: 70 MMHG | BODY MASS INDEX: 31.28 KG/M2

## 2018-09-05 DIAGNOSIS — E03.9 ACQUIRED HYPOTHYROIDISM: ICD-10-CM

## 2018-09-05 DIAGNOSIS — E78.49 OTHER HYPERLIPIDEMIA: ICD-10-CM

## 2018-09-05 DIAGNOSIS — Z11.59 NEED FOR HEPATITIS C SCREENING TEST: ICD-10-CM

## 2018-09-05 DIAGNOSIS — R73.01 IMPAIRED FASTING GLUCOSE: ICD-10-CM

## 2018-09-05 DIAGNOSIS — R53.82 CHRONIC FATIGUE: Primary | ICD-10-CM

## 2018-09-05 PROCEDURE — 1036F TOBACCO NON-USER: CPT | Performed by: FAMILY MEDICINE

## 2018-09-05 PROCEDURE — 3008F BODY MASS INDEX DOCD: CPT | Performed by: FAMILY MEDICINE

## 2018-09-05 PROCEDURE — 99214 OFFICE O/P EST MOD 30 MIN: CPT | Performed by: FAMILY MEDICINE

## 2018-09-05 PROCEDURE — 3725F SCREEN DEPRESSION PERFORMED: CPT | Performed by: FAMILY MEDICINE

## 2018-09-05 NOTE — PROGRESS NOTES
Assessment/Plan:    No problem-specific Assessment & Plan notes found for this encounter  Diagnoses and all orders for this visit:    Chronic fatigue  -     CBC and differential; Future  -     Lyme disease, western blot; Future  -     CBC and differential  -     Lyme disease, western blot    Acquired hypothyroidism  -     TSH, 3rd generation with Free T4 reflex; Future  -     TSH, 3rd generation with Free T4 reflex    Impaired fasting glucose  -     Comprehensive metabolic panel; Future  -     Hemoglobin A1c (w/out EAG); Future  -     Comprehensive metabolic panel  -     Hemoglobin A1c (w/out EAG)    Other hyperlipidemia  -     Lipid Panel with Direct LDL reflex    Need for hepatitis C screening test  -     Hepatitis C antibody; Future  -     Hepatitis C antibody        Chronic fatgue  Will need to check thyroid and additional blood work  Acquired hypothyroid : may need adjustment in thyroid medication   Impaired fasting sugar: r/o diabetes  Pt will monitor bp with home bp cuff , may need amlodipine daily   bmi 31, discussed weight loss, increased activity and dietary adjustment   Hepatitis C screen:  Low risk      Subjective:   Chief Complaint   Patient presents with    Hypertension    Fatigue        Patient ID: Sasha Solo is a 77 y o  female  Pt wouls like to start exercise routine, is concerned about elevated bp  Had high bp before and after procedure recent egd  States she did not feel nervous at the time  Complains of Fatigue over the past few years since halfway, gradual progression  Retired over 3 1/2 years  Has not been exercising  Takes amlodipine intermittently  Took today  Pt does have home bp cuff  She is planning on Walking on her driveway for exercise  She has some twinges on the right side of her chest  Symptoms resolve when taking amlodipine            The following portions of the patient's history were reviewed and updated as appropriate: allergies, current medications, past family history, past medical history, past social history, past surgical history and problem list     Review of Systems   Constitutional: Negative for fever  HENT: Negative  Eyes: Negative  Respiratory: Negative  Negative for cough  Cardiovascular: Negative  Gastrointestinal: Negative  Endocrine: Negative  Genitourinary: Negative  Musculoskeletal: Negative  Skin: Negative  Allergic/Immunologic: Negative  Neurological: Negative  Psychiatric/Behavioral: Negative            Objective:  Vitals:    09/05/18 1130 09/05/18 1155   BP: 142/68 130/70   Pulse: 77    Temp: 98 3 °F (36 8 °C)    Weight: 77 1 kg (170 lb)    Height: 5' 2" (1 575 m)       Physical Exam

## 2018-09-05 NOTE — PATIENT INSTRUCTIONS
Increase activity, walking 3 times a day, at least 3 days a week  Monitoring   Exercise tape  Fasting blood work at [employer]    Increase vegetables

## 2018-09-09 LAB
ALBUMIN SERPL-MCNC: 4.6 G/DL (ref 3.5–4.8)
ALBUMIN/GLOB SERPL: 1.8 {RATIO} (ref 1.2–2.2)
ALP SERPL-CCNC: 49 IU/L (ref 39–117)
ALT SERPL-CCNC: 18 IU/L (ref 0–32)
AST SERPL-CCNC: 26 IU/L (ref 0–40)
B BURGDOR IGG PATRN SER IB-IMP: NEGATIVE
B BURGDOR IGM PATRN SER IB-IMP: POSITIVE
B BURGDOR18KD IGG SER QL IB: ABNORMAL
B BURGDOR23KD IGG SER QL IB: ABNORMAL
B BURGDOR23KD IGM SER QL IB: PRESENT
B BURGDOR28KD IGG SER QL IB: ABNORMAL
B BURGDOR30KD IGG SER QL IB: ABNORMAL
B BURGDOR39KD IGG SER QL IB: ABNORMAL
B BURGDOR39KD IGM SER QL IB: PRESENT
B BURGDOR41KD IGG SER QL IB: ABNORMAL
B BURGDOR41KD IGM SER QL IB: ABNORMAL
B BURGDOR45KD IGG SER QL IB: ABNORMAL
B BURGDOR58KD IGG SER QL IB: ABNORMAL
B BURGDOR66KD IGG SER QL IB: ABNORMAL
B BURGDOR93KD IGG SER QL IB: ABNORMAL
BASOPHILS # BLD AUTO: 0 X10E3/UL (ref 0–0.2)
BASOPHILS NFR BLD AUTO: 0 %
BILIRUB SERPL-MCNC: 0.4 MG/DL (ref 0–1.2)
BUN SERPL-MCNC: 10 MG/DL (ref 8–27)
BUN/CREAT SERPL: 13 (ref 12–28)
CALCIUM SERPL-MCNC: 9.7 MG/DL (ref 8.7–10.3)
CHLORIDE SERPL-SCNC: 101 MMOL/L (ref 96–106)
CHOLEST SERPL-MCNC: 210 MG/DL (ref 100–199)
CO2 SERPL-SCNC: 23 MMOL/L (ref 20–29)
CREAT SERPL-MCNC: 0.77 MG/DL (ref 0.57–1)
EOSINOPHIL # BLD AUTO: 0.2 X10E3/UL (ref 0–0.4)
EOSINOPHIL NFR BLD AUTO: 3 %
ERYTHROCYTE [DISTWIDTH] IN BLOOD BY AUTOMATED COUNT: 13.9 % (ref 12.3–15.4)
GLOBULIN SER-MCNC: 2.5 G/DL (ref 1.5–4.5)
GLUCOSE SERPL-MCNC: 109 MG/DL (ref 65–99)
HBA1C MFR BLD: 6 % (ref 4.8–5.6)
HCT VFR BLD AUTO: 39.9 % (ref 34–46.6)
HCV AB S/CO SERPL IA: <0.1 S/CO RATIO (ref 0–0.9)
HDLC SERPL-MCNC: 64 MG/DL
HGB BLD-MCNC: 12.9 G/DL (ref 11.1–15.9)
IMM GRANULOCYTES # BLD: 0 X10E3/UL (ref 0–0.1)
IMM GRANULOCYTES NFR BLD: 0 %
LDLC SERPL CALC-MCNC: 123 MG/DL (ref 0–99)
LDLC/HDLC SERPL: 1.9 RATIO (ref 0–3.2)
LYMPHOCYTES # BLD AUTO: 2.1 X10E3/UL (ref 0.7–3.1)
LYMPHOCYTES NFR BLD AUTO: 35 %
MCH RBC QN AUTO: 30.4 PG (ref 26.6–33)
MCHC RBC AUTO-ENTMCNC: 32.3 G/DL (ref 31.5–35.7)
MCV RBC AUTO: 94 FL (ref 79–97)
MONOCYTES # BLD AUTO: 0.7 X10E3/UL (ref 0.1–0.9)
MONOCYTES NFR BLD AUTO: 11 %
NEUTROPHILS # BLD AUTO: 3.1 X10E3/UL (ref 1.4–7)
NEUTROPHILS NFR BLD AUTO: 51 %
PLATELET # BLD AUTO: 249 X10E3/UL (ref 150–379)
POTASSIUM SERPL-SCNC: 4 MMOL/L (ref 3.5–5.2)
PROT SERPL-MCNC: 7.1 G/DL (ref 6–8.5)
RBC # BLD AUTO: 4.25 X10E6/UL (ref 3.77–5.28)
SL AMB EGFR AFRICAN AMERICAN: 90 ML/MIN/1.73
SL AMB EGFR NON AFRICAN AMERICAN: 78 ML/MIN/1.73
SL AMB VLDL CHOLESTEROL CALC: 23 MG/DL (ref 5–40)
SODIUM SERPL-SCNC: 140 MMOL/L (ref 134–144)
TRIGL SERPL-MCNC: 116 MG/DL (ref 0–149)
TSH SERPL DL<=0.005 MIU/L-ACNC: 3.34 UIU/ML (ref 0.45–4.5)
WBC # BLD AUTO: 6.1 X10E3/UL (ref 3.4–10.8)

## 2018-09-11 DIAGNOSIS — A69.20 LYME DISEASE: Primary | ICD-10-CM

## 2018-09-11 RX ORDER — DOXYCYCLINE 100 MG/1
100 TABLET ORAL 2 TIMES DAILY
Qty: 42 TABLET | Refills: 0 | Status: SHIPPED | OUTPATIENT
Start: 2018-09-11 | End: 2018-10-02

## 2018-09-12 DIAGNOSIS — K44.9 HIATAL HERNIA: Primary | ICD-10-CM

## 2018-09-12 RX ORDER — OMEPRAZOLE 40 MG/1
40 CAPSULE, DELAYED RELEASE ORAL DAILY
Qty: 90 CAPSULE | Refills: 3 | Status: SHIPPED | OUTPATIENT
Start: 2018-09-12 | End: 2019-10-02 | Stop reason: SDUPTHER

## 2018-09-24 DIAGNOSIS — K21.9 GASTROESOPHAGEAL REFLUX DISEASE WITHOUT ESOPHAGITIS: ICD-10-CM

## 2018-09-24 RX ORDER — OMEPRAZOLE 20 MG/1
CAPSULE, DELAYED RELEASE ORAL
Qty: 90 CAPSULE | Refills: 3 | Status: SHIPPED | OUTPATIENT
Start: 2018-09-24 | End: 2018-11-12 | Stop reason: CLARIF

## 2018-11-12 ENCOUNTER — OFFICE VISIT (OUTPATIENT)
Dept: FAMILY MEDICINE CLINIC | Facility: CLINIC | Age: 68
End: 2018-11-12

## 2018-11-12 ENCOUNTER — OFFICE VISIT (OUTPATIENT)
Dept: FAMILY MEDICINE CLINIC | Facility: CLINIC | Age: 68
End: 2018-11-12
Payer: COMMERCIAL

## 2018-11-12 VITALS
BODY MASS INDEX: 31.1 KG/M2 | TEMPERATURE: 99.2 F | DIASTOLIC BLOOD PRESSURE: 80 MMHG | SYSTOLIC BLOOD PRESSURE: 160 MMHG | HEART RATE: 89 BPM | OXYGEN SATURATION: 91 % | WEIGHT: 169 LBS | HEIGHT: 62 IN

## 2018-11-12 DIAGNOSIS — E78.49 OTHER HYPERLIPIDEMIA: ICD-10-CM

## 2018-11-12 DIAGNOSIS — I10 HYPERTENSION, UNSPECIFIED TYPE: ICD-10-CM

## 2018-11-12 DIAGNOSIS — J45.20 MILD INTERMITTENT REACTIVE AIRWAY DISEASE WITHOUT COMPLICATION: Primary | ICD-10-CM

## 2018-11-12 DIAGNOSIS — F41.1 GENERALIZED ANXIETY DISORDER: ICD-10-CM

## 2018-11-12 DIAGNOSIS — S23.41XA RIB SPRAIN, INITIAL ENCOUNTER: Primary | ICD-10-CM

## 2018-11-12 PROCEDURE — 1160F RVW MEDS BY RX/DR IN RCRD: CPT | Performed by: FAMILY MEDICINE

## 2018-11-12 PROCEDURE — 99214 OFFICE O/P EST MOD 30 MIN: CPT | Performed by: FAMILY MEDICINE

## 2018-11-12 PROCEDURE — 3008F BODY MASS INDEX DOCD: CPT | Performed by: FAMILY MEDICINE

## 2018-11-12 PROCEDURE — 4040F PNEUMOC VAC/ADMIN/RCVD: CPT | Performed by: FAMILY MEDICINE

## 2018-11-12 PROCEDURE — 99213 OFFICE O/P EST LOW 20 MIN: CPT | Performed by: FAMILY MEDICINE

## 2018-11-12 RX ORDER — ALBUTEROL SULFATE 90 UG/1
2 AEROSOL, METERED RESPIRATORY (INHALATION) EVERY 6 HOURS PRN
Qty: 6.7 G | Refills: 0 | Status: SHIPPED | OUTPATIENT
Start: 2018-11-12 | End: 2018-11-23 | Stop reason: SDUPTHER

## 2018-11-12 NOTE — PROGRESS NOTES
Subjective: Pt is here to discuss multiple issues     Patient ID: Mingo Triplett is a 77 y o  female  Pt complains of left sided chest pain, intermittent, exertional, no shortness of breath  She has been coughing, dry cough, not productive  she denies a fever  No sweatiness  Her appetite has been good  Symptoms are worse when she moves in certain directions like reaching over her head  She has not tried any medications  She denies any heartburn  She cannot lie on her left side to sleep  Increases the pain  She has been blowing and raking leaves  Symptoms for 2 weeks  The following portions of the patient's history were reviewed and updated as appropriate: allergies, current medications, past family history, past medical history, past social history, past surgical history and problem list     Review of Systems   Constitutional: Negative for fatigue and fever  HENT: Negative  Eyes: Negative  Respiratory: Positive for cough  Negative for shortness of breath and wheezing  Cardiovascular: Positive for chest pain  Negative for palpitations and leg swelling  Gastrointestinal: Negative  Genitourinary: Negative  Musculoskeletal: Negative  Skin: Negative  Allergic/Immunologic: Negative  Neurological: Negative  Psychiatric/Behavioral: The patient is nervous/anxious  Objective:  Vitals:    11/14/18 1257   BP: 160/80   Pulse: 89   Temp: 99 2 °F (37 3 °C)   SpO2: 91%   Weight: 76 7 kg (169 lb)   Height: 5' 2" (1 575 m)      Physical Exam   Constitutional: She is oriented to person, place, and time  She appears well-developed and well-nourished  HENT:   Head: Normocephalic and atraumatic  Right Ear: External ear normal    Left Ear: External ear normal    Nose: Nose normal    Mouth/Throat: Oropharynx is clear and moist    Eyes: Pupils are equal, round, and reactive to light  Conjunctivae and EOM are normal    Neck: Normal range of motion  Neck supple  Cardiovascular: Normal rate, regular rhythm, normal heart sounds and intact distal pulses  Pulmonary/Chest: Effort normal and breath sounds normal    Abdominal: Soft  Bowel sounds are normal    Musculoskeletal: Normal range of motion  She exhibits tenderness  Reproducible pain left rib 4 anteriorly and laterally     Neurological: She is alert and oriented to person, place, and time  Skin: Skin is warm and dry  Psychiatric: She has a normal mood and affect  Her behavior is normal  Judgment and thought content normal    Nursing note and vitals reviewed  Assessment/Plan:    No problem-specific Assessment & Plan notes found for this encounter  Diagnoses and all orders for this visit:    Rib sprain, initial encounter:  Atypical chest pain likely rib strain due to aggravating motions of raking around the same time that the symptoms started  Discussed cardiac symptoms- and call 911 or office if cardiac symptoms  Generalized anxiety disorder: increased secondary to recent left sided chest pain    Other hyperlipidemia: slight elevation, 9/7/2018 total: 210, tg 116, hdl 64, ldl 123       Hypertension, unspecified type: likely related to anxiety, pt will monitor with home cuff and call if bp remains elevated over 130/80

## 2018-11-12 NOTE — PATIENT INSTRUCTIONS
Ibuprofen 200mg 1 tab twice a day with food  Cool compresses 15 min 3 times a day  Avoid reaching, pushing, pulling, heavy lifting

## 2018-11-12 NOTE — PROGRESS NOTES
Subjective:   Chief Complaint   Patient presents with    Shortness of Breath     started about 1 week ago  c/o sob that is worse with exertion, left sided chest discomfort, wheezing, and dry cough  Patient states she has a history of asthmatic bronchitis  O2 reading is low today  Patient ID: Ant Pink is a 77 y o  female  Feels pressure on left side of chest    Noticed some wheezing  Left shoulder blade  Cough at night  No fever  Appetite ok, stays full for a long time  Started up wood burning stove  No pain with deep breath  Shoulder pain  2 weeks of symptoms         The following portions of the patient's history were reviewed and updated as appropriate: allergies, current medications, past family history, past medical history, past social history, past surgical history and problem list     Review of Systems      Objective:  Vitals:    11/12/18 1623   BP: 168/82   Pulse: 89   Temp: 99 2 °F (37 3 °C)   SpO2: 91%   Weight: 76 7 kg (169 lb)   Height: 5' 2" (1 575 m)      Physical Exam      Assessment/Plan:    No problem-specific Assessment & Plan notes found for this encounter  There are no diagnoses linked to this encounter

## 2018-11-13 PROBLEM — S23.41XA RIB SPRAIN, INITIAL ENCOUNTER: Status: ACTIVE | Noted: 2018-11-13

## 2018-11-13 PROBLEM — Z11.59 NEED FOR HEPATITIS C SCREENING TEST: Status: RESOLVED | Noted: 2018-09-05 | Resolved: 2018-11-13

## 2018-11-14 VITALS
BODY MASS INDEX: 31.1 KG/M2 | SYSTOLIC BLOOD PRESSURE: 160 MMHG | DIASTOLIC BLOOD PRESSURE: 80 MMHG | TEMPERATURE: 99.2 F | HEART RATE: 89 BPM | HEIGHT: 62 IN | WEIGHT: 169 LBS | OXYGEN SATURATION: 91 %

## 2018-11-14 PROBLEM — I10 HYPERTENSION: Status: ACTIVE | Noted: 2018-11-14

## 2018-11-23 DIAGNOSIS — J45.20 MILD INTERMITTENT REACTIVE AIRWAY DISEASE WITHOUT COMPLICATION: ICD-10-CM

## 2019-01-01 NOTE — PRE-PROCEDURE INSTRUCTIONS
Pre-Surgery Instructions:   Medication Instructions    ALPRAZolam (XANAX) 1 mg tablet Patient was instructed by Physician and understands   amLODIPine (NORVASC) 5 mg tablet Patient was instructed by Physician and understands   aspirin 81 mg chewable tablet Patient was instructed by Physician and understands   citalopram (CeleXA) 40 mg tablet Patient was instructed by Physician and understands   diphenoxylate-atropine (LOMOTIL) 2 5-0 025 mg per tablet Patient was instructed by Physician and understands   Iron-Vitamins (GERITOL COMPLETE PO) Patient was instructed by Physician and understands   levothyroxine 25 mcg tablet Patient was instructed by Physician and understands   omeprazole (PriLOSEC) 20 mg delayed release capsule Patient was instructed by Physician and understands  Follow Preoperative instruction physician  Wear loose comfortable, easy on/off clothing  You will receive a call with your time to arrive at the hospital     Secure transportation, you will be having anesthesia  [de-identified] : 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 \par He was born with a large and superficially discolored mass over his right buttock/flank. He was transferred from [facility] [place] the day after birth and had inpatient workup and initiation of treatment. Imaging included US, MRI, and PET CT. CT revealed 2 non specific small nodules in his lungs that were felt not to be metastatic disease since they were not seen on PET. Imaging otherwise negative other than the known mass. He had a biopsy on 3/12 that revealed spindle/sclerosing VERO, mediport placed 3/15, and initiated treatment according to protocol [id number] on 3/15 with VAc/Vi. Due to age will give VAc first and then follow with VI. .\par \par Diagnosis: VERO\par Study: [id number]\par Enrolled: no\par Following: [id number]\par Modified: yes, doing vac cycles first due to patients age and size and risk of side effects\par Surgeries: biopsy 3/12, broviac 3/15, circumcision 3/29\par hospitalizations: 3/7-3/29 for workup and initiation of treatment\par 4/2-4/3 cycle 3 VAC, blood transfusion\par 4/23-4/24 cycle 5 VAC per protocol, (cycle 3 for Gregg), blood transfusion\par 5/13-5/14 cycle 8 week 22 vac, blood transfusion\par 5/23-24 cycle 8 week 23 received blood and neupogen\par  [de-identified] : Gregg returns for clearance for cycle 10 week 28 VAC\par Spitting up/vomiting is less, he is no longer getting zofran around the clock.\par No fevers or illness\par No open areas on buttocks\par Mom says he is doing well and has no concerns.\par \par Allergies, medical/surgical history, hospitalizations, meds, and social history reviewed and are unchanged other than as noted above.\par

## 2019-01-23 ENCOUNTER — HOSPITAL ENCOUNTER (OUTPATIENT)
Dept: NON INVASIVE DIAGNOSTICS | Facility: HOSPITAL | Age: 69
Discharge: HOME | End: 2019-01-23
Payer: COMMERCIAL

## 2019-01-23 ENCOUNTER — TRANSCRIBE ORDERS (OUTPATIENT)
Dept: ADMINISTRATIVE | Facility: HOSPITAL | Age: 69
End: 2019-01-23

## 2019-01-23 DIAGNOSIS — H40.013 OPEN ANGLE WITH BORDERLINE FINDINGS AND LOW GLAUCOMA RISK IN BOTH EYES: Primary | ICD-10-CM

## 2019-01-23 DIAGNOSIS — H40.013 OPEN ANGLE WITH BORDERLINE FINDINGS AND LOW GLAUCOMA RISK IN BOTH EYES: ICD-10-CM

## 2019-01-23 PROCEDURE — 93005 ELECTROCARDIOGRAM TRACING: CPT

## 2019-01-24 LAB
ATRIAL RATE: 67 BPM
P AXIS: 58 DEGREES
PR INTERVAL: 172 MS
QRS AXIS: 2 DEGREES
QRSD INTERVAL: 92 MS
QT INTERVAL: 436 MS
QTC INTERVAL: 460 MS
T WAVE AXIS: 46 DEGREES
VENTRICULAR RATE: 67 BPM

## 2019-01-24 PROCEDURE — 93010 ELECTROCARDIOGRAM REPORT: CPT | Performed by: INTERNAL MEDICINE

## 2019-01-29 ENCOUNTER — OFFICE VISIT (OUTPATIENT)
Dept: FAMILY MEDICINE CLINIC | Facility: CLINIC | Age: 69
End: 2019-01-29
Payer: COMMERCIAL

## 2019-01-29 VITALS
TEMPERATURE: 98.1 F | HEIGHT: 62 IN | DIASTOLIC BLOOD PRESSURE: 75 MMHG | WEIGHT: 175 LBS | HEART RATE: 67 BPM | SYSTOLIC BLOOD PRESSURE: 135 MMHG | OXYGEN SATURATION: 98 % | BODY MASS INDEX: 32.2 KG/M2

## 2019-01-29 DIAGNOSIS — H25.89 OTHER AGE-RELATED CATARACT OF BOTH EYES: ICD-10-CM

## 2019-01-29 DIAGNOSIS — I10 ESSENTIAL HYPERTENSION: ICD-10-CM

## 2019-01-29 DIAGNOSIS — Z01.818 PREOP EXAMINATION: Primary | ICD-10-CM

## 2019-01-29 DIAGNOSIS — G47.33 OBSTRUCTIVE SLEEP APNEA: ICD-10-CM

## 2019-01-29 PROCEDURE — 3078F DIAST BP <80 MM HG: CPT | Performed by: FAMILY MEDICINE

## 2019-01-29 PROCEDURE — 3075F SYST BP GE 130 - 139MM HG: CPT | Performed by: FAMILY MEDICINE

## 2019-01-29 PROCEDURE — 99214 OFFICE O/P EST MOD 30 MIN: CPT | Performed by: FAMILY MEDICINE

## 2019-01-29 RX ORDER — TIMOLOL MALEATE 5 MG/ML
SOLUTION/ DROPS OPHTHALMIC
Refills: 2 | COMMUNITY
Start: 2019-01-25 | End: 2019-07-03 | Stop reason: ALTCHOICE

## 2019-01-29 RX ORDER — BIMATOPROST 0.01 %
DROPS OPHTHALMIC (EYE)
Refills: 6 | COMMUNITY
Start: 2019-01-19 | End: 2019-01-29 | Stop reason: ALTCHOICE

## 2019-01-29 RX ORDER — OFLOXACIN 3 MG/ML
SOLUTION/ DROPS OPHTHALMIC
Refills: 1 | COMMUNITY
Start: 2019-01-15 | End: 2019-07-03 | Stop reason: ALTCHOICE

## 2019-01-29 NOTE — PROGRESS NOTES
[facility] PRE-OPERATIVE EVALUATION  [facility] PRACTICE    NAME: Dexter Mccollum  AGE: 77 y o  SEX: female  : 1950     DATE: 2019    [facility] Pre-Operative Evaluation      Chief Complaint: Pre-operative Evaluation     Surgery: istent phaco left eye/   Anticipated Date of Surgery: 2019  Referring Provider: Self, Referral       History of Present Illness:     Dexter Mccollum is a 77 y o  female who presents to the office today for a preoperative consultation at the request of surgeon, Dr Kyara Garza, who plans on performing istent/phaco  on 2019  Planned anesthesia is local  Patient has a bleeding risk of: no recent abnormal bleeding, no remote history of abnormal bleeding and use of Ca-channel blockers (see med list)  Patient does not have objections to receiving blood products if needed  Current anti-platelet/anti-coagulation medications that the patient is prescribed includes: Aspirin  81mg daiy      Assessment of Chronic Conditions:   - Hypertension:   Assessment of Cardiac Risk:  · Denies unstable or severe angina or MI in the last 6 weeks or history of stent placement in the last year   · Denies decompensated heart failure (e g  New onset heart failure, NYHA functional class IV heart failure, or worsening existing heart failure)  · Denies significant arrhythmias such as high grade AV block, symptomatic ventricular arrhythmia, newly recognized ventricular tachycardia, supraventricular tachycardia with resting heart rate >100, or symptomatic bradycardia  · Denies severe heart valve disease including aortic stenosis or symptomatic mitral stenosis     Exercise Capacity:  · Able to walk 4 blocks without symptoms?: Yes  · Able to walk 2 flights without symptoms?: Yes    Prior Anesthesia Reactions: No     Personal history of venous thromboembolic disease? No    History of steroid use for >2 weeks within last year?  No         Review of Systems: Review of Systems   Constitutional: Negative  Negative for fatigue and fever  HENT: Negative  Eyes: Positive for visual disturbance  Glare at night   Respiratory: Negative  Negative for cough  Cardiovascular: Negative  Gastrointestinal: Negative  Endocrine: Negative  Genitourinary: Negative  Musculoskeletal: Negative  Skin: Negative  Allergic/Immunologic: Negative  Neurological: Negative  Psychiatric/Behavioral: Negative  Current Problem List:     Patient Active Problem List   Diagnosis    Abnormal mammogram    Anxiety disorder    Glaucoma suspect    Hiatal hernia    Hyperlipidemia    Hypothyroidism    Impaired fasting glucose    Obstructive sleep apnea    Other age-related cataract    Pulmonary nodule seen on imaging study    Raynaud's syndrome without gangrene    Reactive airway disease    Burping    Chronic fatigue    Rib sprain, initial encounter    Hypertension    Preop examination       Allergies: Allergies   Allergen Reactions    Lumigan [Bimatoprost]     Other      Other reaction(s): hay fever  Category: Allergy;     Pneumococcal Polysaccharide Vaccine      Other reaction(s): Chills, fever and mottling  Category:  Allergy;        Current Medications:       Current Outpatient Prescriptions:     ALPRAZolam (XANAX) 1 mg tablet, Take 1 tablet (1 mg total) by mouth 3 (three) times a day as needed for anxiety, Disp: 90 tablet, Rfl: 0    amLODIPine (NORVASC) 5 mg tablet, TAKE ONE TABLET BY MOUTH DAILY, Disp: 90 tablet, Rfl: 2    aspirin 81 mg chewable tablet, Chew 1 tablet daily, Disp: , Rfl:     citalopram (CeleXA) 40 mg tablet, Take 1 tablet (40 mg total) by mouth daily, Disp: 30 tablet, Rfl: 5    diphenoxylate-atropine (LOMOTIL) 2 5-0 025 mg per tablet, Take 1 tablet by mouth daily as needed, Disp: , Rfl:     levothyroxine 25 mcg tablet, Take 1 tablet (25 mcg total) by mouth daily, Disp: 30 tablet, Rfl: 2    omeprazole (PriLOSEC) 40 MG capsule, Take 1 capsule (40 mg total) by mouth daily, Disp: 90 capsule, Rfl: 3    PROAIR  (90 Base) MCG/ACT inhaler, Inhale 2 puffs every 6 (six) hours as needed for wheezing, Disp: 8 5 g, Rfl: 0    timolol (TIMOPTIC) 0 5 % ophthalmic solution, instill ONE drop into BOTH eyes EVERY DAY, Disp: , Rfl: 2    Iron-Vitamins (GERITOL COMPLETE PO), Take 1 tablet by mouth daily, Disp: , Rfl:     ofloxacin (OCUFLOX) 0 3 % ophthalmic solution, START THIS DROP 2 DAYS BEFORE SURGERY  instill 1 drop by ophthalmic route 4 times a day left eye  STOP 1 WEEK AFTER SURGERY, Disp: , Rfl: 1    Past Medical History:       Past Medical History:   Diagnosis Date    Asthma     Disease of thyroid gland     GERD (gastroesophageal reflux disease)     Migraine     Raynaud disease     Sleep apnea     Visual disturbance         Past Surgical History:   Procedure Laterality Date    COLON SURGERY      HALLUX VALGUS CORRECTION Right 06/19/2007    INCISION TENDON SHEATH HAND  05/23/2011    Release of A1 pulley    KNEE ARTHROSCOPY W/ MENISCAL REPAIR Left 2003    WI ESOPHAGOGASTRODUODENOSCOPY TRANSORAL DIAGNOSTIC N/A 8/22/2018    Procedure: ESOPHAGOGASTRODUODENOSCOPY (EGD);   Surgeon: Rosy Patel MD;  Location:  MAIN OR;  Service: Gastroenterology    UPPER GASTROINTESTINAL ENDOSCOPY          Family History   Problem Relation Age of Onset    Lung cancer Mother     Stomach cancer Father     Other Maternal Grandmother         Stroke syndrome    Other Maternal Aunt         Cardiomegaly        Social History     Social History    Marital status: /Civil Union     Spouse name: N/A    Number of children: N/A    Years of education: N/A     Occupational History   [employer] registration       Without occupational exposure (Part-time)     Social History Main Topics    Smoking status: Never Smoker    Smokeless tobacco: Never Used    Alcohol use 3 0 oz/week     5 Cans of beer per week    Drug use: No    Sexual activity: Not on file     Other Topics Concern    Not on file     Social History Narrative    Daily coffee consumption (1 cup/day)    Patient has living will    Uses safety equipment - seatbelts        Physical Exam:     /75   Pulse 67   Temp 98 1 °F (36 7 °C)   Ht 5' 2" (1 575 m)   Wt 79 4 kg (175 lb)   SpO2 98%   BMI 32 01 kg/m²     Physical Exam   Constitutional: She is oriented to person, place, and time  She appears well-developed and well-nourished  HENT:   Head: Normocephalic and atraumatic  Eyes: Pupils are equal, round, and reactive to light  Conjunctivae and EOM are normal    Neck: Normal range of motion  Neck supple  Cardiovascular: Normal rate, regular rhythm and normal heart sounds  Pulmonary/Chest: Effort normal and breath sounds normal    Abdominal: Soft  Bowel sounds are normal    Neurological: She is alert and oriented to person, place, and time  Skin: Skin is warm and dry  Psychiatric: She has a normal mood and affect  Her behavior is normal  Judgment and thought content normal    Nursing note and vitals reviewed  Data:     Pre-operative work-up    Laboratory Results: not done     EKG: I have personally reviewed pertinent reports  normal sinus rhythm    Chest x-ray: not indicated       Previous cardiopulmonary studies within the past year:  · Echocardiogram:   · Cardiac Catheterization:   · Stress Test:   · Pulmonary Function Testing:       Assessment & Recommendations:     1  Preop examination      pt cleared for surgery   2  Other age-related cataract of both eyes     3  Essential hypertension      controlled, continue current medication   4  Obstructive sleep apnea         Pre-Op Evaluation Assessment  77 y o  female with planned surgery: istent/phaco     Known risk factors for perioperative complications: None  none   Current medications which may produce withdrawal symptoms if withheld perioperatively:  none    Pre-Op Evaluation Plan  1   Further preoperative workup as follows:   - None; no further preoperative work-up is required    2  Medication Management/Recommendations:   - None, continue medication regimen including morning of surgery, with sip of water    3  Prophylaxis for cardiac events with perioperative beta-blockers: not indicated  4  Patient requires further consultation with: None    Clearance  Patient is CLEARED for surgery without any additional cardiac testing       Vimal Winchester DO  [facility]  [address] [id number]  Phone#  [phone]  Fax#  [phone]

## 2019-02-21 ENCOUNTER — TRANSCRIBE ORDERS (OUTPATIENT)
Dept: ADMINISTRATIVE | Facility: HOSPITAL | Age: 69
End: 2019-02-21

## 2019-02-21 DIAGNOSIS — E03.9 ACQUIRED HYPOTHYROIDISM: ICD-10-CM

## 2019-02-21 DIAGNOSIS — Z12.31 VISIT FOR SCREENING MAMMOGRAM: Primary | ICD-10-CM

## 2019-02-21 RX ORDER — LEVOTHYROXINE SODIUM 0.03 MG/1
25 TABLET ORAL DAILY
Qty: 90 TABLET | Refills: 2 | Status: SHIPPED | OUTPATIENT
Start: 2019-02-21 | End: 2019-10-02 | Stop reason: SDUPTHER

## 2019-03-13 ENCOUNTER — OFFICE VISIT (OUTPATIENT)
Dept: FAMILY MEDICINE CLINIC | Facility: CLINIC | Age: 69
End: 2019-03-13
Payer: COMMERCIAL

## 2019-03-13 VITALS
OXYGEN SATURATION: 97 % | BODY MASS INDEX: 32.07 KG/M2 | HEART RATE: 70 BPM | HEIGHT: 62 IN | SYSTOLIC BLOOD PRESSURE: 135 MMHG | DIASTOLIC BLOOD PRESSURE: 70 MMHG | TEMPERATURE: 98.8 F | WEIGHT: 174.25 LBS

## 2019-03-13 DIAGNOSIS — H25.89 OTHER AGE-RELATED CATARACT OF RIGHT EYE: ICD-10-CM

## 2019-03-13 DIAGNOSIS — Z01.818 PREOP EXAMINATION: Primary | ICD-10-CM

## 2019-03-13 DIAGNOSIS — H40.001 GLAUCOMA SUSPECT OF RIGHT EYE: ICD-10-CM

## 2019-03-13 PROCEDURE — 3008F BODY MASS INDEX DOCD: CPT | Performed by: FAMILY MEDICINE

## 2019-03-13 PROCEDURE — 99214 OFFICE O/P EST MOD 30 MIN: CPT | Performed by: FAMILY MEDICINE

## 2019-03-13 PROCEDURE — 1160F RVW MEDS BY RX/DR IN RCRD: CPT | Performed by: FAMILY MEDICINE

## 2019-03-13 PROCEDURE — 1036F TOBACCO NON-USER: CPT | Performed by: FAMILY MEDICINE

## 2019-03-13 NOTE — PROGRESS NOTES
[facility] PRE-OPERATIVE EVALUATION  [facility] PRACTICE    NAME: Adrienne White  AGE: 77 y o  SEX: female  : 1950     DATE: 3/13/2019    [facility] Pre-Operative Evaluation      Chief Complaint: Pre-operative Evaluation     Surgery: LEFT CATARACT AND STENT RIGHT   Anticipated Date of Surgery: 2019  Referring Provider: Self, Referral       History of Present Illness:     Adrienne White is a 77 y o  female who presents to the office today for a preoperative consultation at the request of surgeon, Dr Demi Diallo, who plans on performing right cataract surgery with stent  on 2019  Planned anesthesia is local  Patient has a bleeding risk of: no recent abnormal bleeding, no remote history of abnormal bleeding and use of Ca-channel blockers (see med list)  Patient does not have objections to receiving blood products if needed  Current anti-platelet/anti-coagulation medications that the patient is prescribed includes: Aspirin  Assessment of Chronic Conditions:   - Hypertension: controlled      Assessment of Cardiac Risk:  · Denies unstable or severe angina or MI in the last 6 weeks or history of stent placement in the last year   · Denies decompensated heart failure (e g  New onset heart failure, NYHA functional class IV heart failure, or worsening existing heart failure)  · Denies significant arrhythmias such as high grade AV block, symptomatic ventricular arrhythmia, newly recognized ventricular tachycardia, supraventricular tachycardia with resting heart rate >100, or symptomatic bradycardia  · Denies severe heart valve disease including aortic stenosis or symptomatic mitral stenosis     Exercise Capacity:  · Able to walk 4 blocks without symptoms?: Yes  · Able to walk 2 flights without symptoms?: Yes    Prior Anesthesia Reactions: No     Personal history of venous thromboembolic disease? No    History of steroid use for >2 weeks within last year?  No Review of Systems:     Review of Systems   Constitutional: Negative  Negative for fatigue and fever  HENT: Negative  Eyes: Positive for visual disturbance  Decreased vision right eye   Respiratory: Negative  Negative for cough  Cardiovascular: Negative  Gastrointestinal: Negative  Endocrine: Negative  Genitourinary: Negative  Musculoskeletal: Negative  Skin: Negative  Allergic/Immunologic: Negative  Neurological: Negative  Psychiatric/Behavioral: Negative  Current Problem List:     Patient Active Problem List   Diagnosis    Abnormal mammogram    Anxiety disorder    Glaucoma suspect    Hiatal hernia    Hyperlipidemia    Hypothyroidism    Impaired fasting glucose    Obstructive sleep apnea    Other age-related cataract    Pulmonary nodule seen on imaging study    Raynaud's syndrome without gangrene    Reactive airway disease    Burping    Chronic fatigue    Rib sprain, initial encounter    Hypertension    Preop examination       Allergies: Allergies   Allergen Reactions    Lumigan [Bimatoprost]     Other      Other reaction(s): hay fever  Category: Allergy;     Pneumococcal Polysaccharide Vaccine      Other reaction(s): Chills, fever and mottling  Category:  Allergy;        Current Medications:       Current Outpatient Medications:     ALPRAZolam (XANAX) 1 mg tablet, Take 1 tablet (1 mg total) by mouth 3 (three) times a day as needed for anxiety, Disp: 90 tablet, Rfl: 0    amLODIPine (NORVASC) 5 mg tablet, TAKE ONE TABLET BY MOUTH DAILY, Disp: 90 tablet, Rfl: 2    aspirin 81 mg chewable tablet, Chew 1 tablet daily, Disp: , Rfl:     citalopram (CeleXA) 40 mg tablet, Take 1 tablet (40 mg total) by mouth daily, Disp: 30 tablet, Rfl: 5    diphenoxylate-atropine (LOMOTIL) 2 5-0 025 mg per tablet, Take 1 tablet by mouth daily as needed, Disp: , Rfl:     Iron-Vitamins (GERITOL COMPLETE PO), Take 1 tablet by mouth daily, Disp: , Rfl:    levothyroxine 25 mcg tablet, Take 1 tablet (25 mcg total) by mouth daily, Disp: 90 tablet, Rfl: 2    ofloxacin (OCUFLOX) 0 3 % ophthalmic solution, START THIS DROP 2 DAYS BEFORE SURGERY  instill 1 drop by ophthalmic route 4 times a day left eye  STOP 1 WEEK AFTER SURGERY, Disp: , Rfl: 1    omeprazole (PriLOSEC) 40 MG capsule, Take 1 capsule (40 mg total) by mouth daily, Disp: 90 capsule, Rfl: 3    PROAIR  (90 Base) MCG/ACT inhaler, Inhale 2 puffs every 6 (six) hours as needed for wheezing, Disp: 8 5 g, Rfl: 0    timolol (TIMOPTIC) 0 5 % ophthalmic solution, instill ONE drop into BOTH eyes EVERY DAY, Disp: , Rfl: 2    Past Medical History:       Past Medical History:   Diagnosis Date    Asthma     Disease of thyroid gland     GERD (gastroesophageal reflux disease)     Migraine     Raynaud disease     Sleep apnea     Visual disturbance         Past Surgical History:   Procedure Laterality Date    COLON SURGERY      HALLUX VALGUS CORRECTION Right 06/19/2007    INCISION TENDON SHEATH HAND  05/23/2011    Release of A1 pulley    KNEE ARTHROSCOPY W/ MENISCAL REPAIR Left 2003    SD ESOPHAGOGASTRODUODENOSCOPY TRANSORAL DIAGNOSTIC N/A 8/22/2018    Procedure: ESOPHAGOGASTRODUODENOSCOPY (EGD);   Surgeon: Nisa Bass MD;  Location:  MAIN OR;  Service: Gastroenterology    UPPER GASTROINTESTINAL ENDOSCOPY          Family History   Problem Relation Age of Onset    Lung cancer Mother     Stomach cancer Father     Other Maternal Grandmother         Stroke syndrome    Other Maternal Aunt         Cardiomegaly        Social History     Socioeconomic History    Marital status: /Civil Union     Spouse name: Not on file    Number of children: Not on file    Years of education: Not on file    Highest education level: Not on file   Occupational History    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)   Social Needs    Financial resource strain: Not on file  Food insecurity:     Worry: Not on file     Inability: Not on file    Transportation needs:     Medical: Not on file     Non-medical: Not on file   Tobacco Use    Smoking status: Never Smoker    Smokeless tobacco: Never Used   Substance and Sexual Activity    Alcohol use: Yes     Alcohol/week: 3 0 oz     Types: 5 Cans of beer per week    Drug use: No    Sexual activity: Not on file   Lifestyle    Physical activity:     Days per week: Not on file     Minutes per session: Not on file    Stress: Not on file   Relationships    Social connections:     Talks on phone: Not on file     Gets together: Not on file     Attends Jehovah's witness service: Not on file     Active member of club or organization: Not on file     Attends meetings of clubs or organizations: Not on file     Relationship status: Not on file    Intimate partner violence:     Fear of current or ex partner: Not on file     Emotionally abused: Not on file     Physically abused: Not on file     Forced sexual activity: Not on file   Other Topics Concern    Not on file   Social History Narrative    Daily coffee consumption (1 cup/day)    Patient has living will    Uses safety equipment - seatbelts        Physical Exam:     /70   Pulse 70   Temp 98 8 °F (37 1 °C)   Ht 5' 2" (1 575 m)   Wt 79 kg (174 lb 4 oz)   SpO2 97%   BMI 31 87 kg/m²     Physical Exam   Constitutional: She is oriented to person, place, and time  She appears well-developed and well-nourished  HENT:   Head: Normocephalic and atraumatic  Cardiovascular: Normal rate, regular rhythm and normal heart sounds  Pulmonary/Chest: Effort normal and breath sounds normal    Abdominal: Soft  Bowel sounds are normal    Neurological: She is alert and oriented to person, place, and time  Skin: Skin is warm and dry  Psychiatric: She has a normal mood and affect  Her behavior is normal  Judgment and thought content normal    Nursing note and vitals reviewed         Data: Pre-operative work-up    Laboratory Results: not indicated     EKG: I have personally reviewed pertinent reports  and EKG is normal 1/23/2019    Chest x-ray: not indicated      Previous cardiopulmonary studies within the past year:  · Echocardiogram:   · Cardiac Catheterization:   · Stress Test:   · Pulmonary Function Testing:       Assessment & Recommendations:     1  Preop examination      cleared for surgery    2  Other age-related cataract of right eye      surgery scheduled for 4/1   3  Glaucoma suspect of right eye      stent placement scheduled 4/1       Pre-Op Evaluation Assessment  77 y o  female with planned surgery: right cataract surgery with stent   Known risk factors for perioperative complications: None  Current medications which may produce withdrawal symptoms if withheld perioperatively: none  Pre-Op Evaluation Plan  1  Further preoperative workup as follows:   - None; no further preoperative work-up is required    2  Medication Management/Recommendations:   - None, continue medication regimen including morning of surgery, with sip of water    3  Prophylaxis for cardiac events with perioperative beta-blockers: not indicated  4  Patient requires further consultation with: None    Clearance  Patient is CLEARED for surgery without any additional cardiac testing       DO SUE Brewer [place] [id number]  Phone#  [phone]  Fax#  [phone]

## 2019-03-19 ENCOUNTER — TELEPHONE (OUTPATIENT)
Dept: FAMILY MEDICINE CLINIC | Facility: CLINIC | Age: 69
End: 2019-03-19

## 2019-03-19 ENCOUNTER — OFFICE VISIT (OUTPATIENT)
Dept: FAMILY MEDICINE CLINIC | Facility: CLINIC | Age: 69
End: 2019-03-19
Payer: COMMERCIAL

## 2019-03-19 VITALS
BODY MASS INDEX: 32.3 KG/M2 | OXYGEN SATURATION: 96 % | HEIGHT: 62 IN | WEIGHT: 175.5 LBS | TEMPERATURE: 98.8 F | HEART RATE: 74 BPM

## 2019-03-19 DIAGNOSIS — M77.8 RIGHT WRIST TENDONITIS: Primary | ICD-10-CM

## 2019-03-19 DIAGNOSIS — I10 ESSENTIAL HYPERTENSION: ICD-10-CM

## 2019-03-19 PROCEDURE — 1160F RVW MEDS BY RX/DR IN RCRD: CPT | Performed by: FAMILY MEDICINE

## 2019-03-19 PROCEDURE — 99213 OFFICE O/P EST LOW 20 MIN: CPT | Performed by: FAMILY MEDICINE

## 2019-03-19 PROCEDURE — 3008F BODY MASS INDEX DOCD: CPT | Performed by: FAMILY MEDICINE

## 2019-03-19 NOTE — PROGRESS NOTES
Subjective:   Chief Complaint   Patient presents with    Wrist Pain     Pt is here today with right wrist pain that is radiating  Pt states it is gradually getting worse over the course of 3 weeks  Patient ID: Jose Orellana is a 77 y o  female  Symptoms in right wrist for a few weeks  Dull ache  Some weakness in hand  She denies any specific injury or repetitive motions  Right handed  No tingling numbness  The following portions of the patient's history were reviewed and updated as appropriate: allergies, current medications, past family history, past medical history, past social history, past surgical history and problem list     Review of Systems   Constitutional: Negative  Negative for fatigue and fever  HENT: Negative  Eyes: Negative  Respiratory: Negative  Negative for cough  Cardiovascular: Negative  Gastrointestinal: Negative  Endocrine: Negative  Genitourinary: Negative  Musculoskeletal: Positive for arthralgias  Right wrist pain  Tenderness over index finger   Skin: Negative  Allergic/Immunologic: Negative  Neurological: Negative  Psychiatric/Behavioral: Negative  Objective:  Vitals:    03/19/19 1437   Pulse: 74   Temp: 98 8 °F (37 1 °C)   SpO2: 96%   Weight: 79 6 kg (175 lb 8 oz)   Height: 5' 2" (1 575 m)      Physical Exam   Constitutional: She is oriented to person, place, and time  She appears well-developed and well-nourished  HENT:   Head: Normocephalic and atraumatic  Cardiovascular: Normal rate, regular rhythm and normal heart sounds  Pulmonary/Chest: Effort normal and breath sounds normal    Abdominal: Soft  Bowel sounds are normal    Musculoskeletal:   Negative Tinel's, negative Phalen's  Positive Finkelstein's   Neurological: She is alert and oriented to person, place, and time  Skin: Skin is warm and dry  Psychiatric: She has a normal mood and affect   Her behavior is normal  Judgment and thought content normal  Nursing note and vitals reviewed  Assessment/Plan:    No problem-specific Assessment & Plan notes found for this encounter  Diagnoses and all orders for this visit:    Right wrist tendonitis  Comments:  Wrist splint, ibuprofen, avoid aggravating motions      Essential hypertension  Comments:  Controlled, continue current medication    BMI 32 0-32 9,adult  Comments:  Weight loss, dietary changes discussed, increased activity

## 2019-03-20 PROBLEM — Z01.818 PREOP EXAMINATION: Status: RESOLVED | Noted: 2019-01-29 | Resolved: 2019-03-20

## 2019-04-22 ENCOUNTER — OFFICE VISIT (OUTPATIENT)
Dept: URGENT CARE | Facility: CLINIC | Age: 69
End: 2019-04-22
Payer: COMMERCIAL

## 2019-04-22 ENCOUNTER — APPOINTMENT (OUTPATIENT)
Dept: RADIOLOGY | Facility: CLINIC | Age: 69
End: 2019-04-22
Payer: COMMERCIAL

## 2019-04-22 VITALS
BODY MASS INDEX: 32.61 KG/M2 | DIASTOLIC BLOOD PRESSURE: 74 MMHG | TEMPERATURE: 99 F | SYSTOLIC BLOOD PRESSURE: 176 MMHG | WEIGHT: 177.2 LBS | HEIGHT: 62 IN | HEART RATE: 74 BPM | OXYGEN SATURATION: 96 % | RESPIRATION RATE: 18 BRPM

## 2019-04-22 DIAGNOSIS — M79.645 FINGER PAIN, LEFT: Primary | ICD-10-CM

## 2019-04-22 DIAGNOSIS — M79.645 FINGER PAIN, LEFT: ICD-10-CM

## 2019-04-22 PROCEDURE — 99203 OFFICE O/P NEW LOW 30 MIN: CPT | Performed by: PHYSICIAN ASSISTANT

## 2019-04-22 PROCEDURE — 73140 X-RAY EXAM OF FINGER(S): CPT

## 2019-05-09 ENCOUNTER — TELEPHONE (OUTPATIENT)
Dept: FAMILY MEDICINE CLINIC | Facility: CLINIC | Age: 69
End: 2019-05-09

## 2019-05-22 DIAGNOSIS — I10 ESSENTIAL HYPERTENSION: ICD-10-CM

## 2019-05-22 RX ORDER — AMLODIPINE BESYLATE 5 MG/1
TABLET ORAL
Qty: 90 TABLET | Refills: 1 | Status: SHIPPED | OUTPATIENT
Start: 2019-05-22 | End: 2019-11-30 | Stop reason: SDUPTHER

## 2019-06-03 ENCOUNTER — OFFICE VISIT (OUTPATIENT)
Dept: OBGYN CLINIC | Facility: CLINIC | Age: 69
End: 2019-06-03
Payer: COMMERCIAL

## 2019-06-03 VITALS
DIASTOLIC BLOOD PRESSURE: 67 MMHG | SYSTOLIC BLOOD PRESSURE: 171 MMHG | BODY MASS INDEX: 31.15 KG/M2 | HEART RATE: 70 BPM | WEIGHT: 175.8 LBS | HEIGHT: 63 IN

## 2019-06-03 DIAGNOSIS — M65.342 TRIGGER FINGER, LEFT RING FINGER: Primary | ICD-10-CM

## 2019-06-03 PROCEDURE — 99204 OFFICE O/P NEW MOD 45 MIN: CPT | Performed by: ORTHOPAEDIC SURGERY

## 2019-06-03 RX ORDER — LIDOCAINE HYDROCHLORIDE AND EPINEPHRINE 10; 10 MG/ML; UG/ML
20 INJECTION, SOLUTION INFILTRATION; PERINEURAL ONCE
Status: CANCELLED | OUTPATIENT
Start: 2019-07-11 | End: 2019-06-03

## 2019-06-12 ENCOUNTER — HOSPITAL ENCOUNTER (OUTPATIENT)
Dept: MAMMOGRAPHY | Facility: CLINIC | Age: 69
Discharge: HOME | End: 2019-06-12
Payer: COMMERCIAL

## 2019-06-12 VITALS — HEIGHT: 62 IN | BODY MASS INDEX: 32.2 KG/M2 | WEIGHT: 175 LBS

## 2019-06-12 DIAGNOSIS — Z12.31 VISIT FOR SCREENING MAMMOGRAM: ICD-10-CM

## 2019-06-12 PROCEDURE — 77067 SCR MAMMO BI INCL CAD: CPT

## 2019-06-12 PROCEDURE — 77063 BREAST TOMOSYNTHESIS BI: CPT

## 2019-07-03 NOTE — PRE-PROCEDURE INSTRUCTIONS
Pre-Surgery Instructions:   Medication Instructions    ALPRAZolam (XANAX) 1 mg tablet Patient was instructed by Physician and understands   amLODIPine (NORVASC) 5 mg tablet Patient was instructed by Physician and understands   aspirin 81 mg chewable tablet Patient was instructed by Physician and understands   citalopram (CeleXA) 40 mg tablet Patient was instructed by Physician and understands   diphenoxylate-atropine (LOMOTIL) 2 5-0 025 mg per tablet Patient was instructed by Physician and understands   Iron-Vitamins (GERITOL COMPLETE PO) Patient was instructed by Physician and understands   levothyroxine 25 mcg tablet Patient was instructed by Physician and understands   omeprazole (PriLOSEC) 40 MG capsule Patient was instructed by Physician and understands   PROAIR  (90 Base) MCG/ACT inhaler Patient was instructed by Physician and understands  Pre shower with hibiclens as instructed by surgeon  You may drive yourself to the hospital   You may take your medications day of surgery  You may eat on the day of your surgery  You may have a dressing, please wear something that will fit over it

## 2019-07-11 ENCOUNTER — HOSPITAL ENCOUNTER (OUTPATIENT)
Facility: HOSPITAL | Age: 69
Setting detail: OUTPATIENT SURGERY
Discharge: HOME | End: 2019-07-11
Attending: ORTHOPAEDIC SURGERY | Admitting: ORTHOPAEDIC SURGERY
Payer: COMMERCIAL

## 2019-07-11 VITALS
TEMPERATURE: 98.5 F | DIASTOLIC BLOOD PRESSURE: 56 MMHG | HEART RATE: 64 BPM | OXYGEN SATURATION: 99 % | RESPIRATION RATE: 16 BRPM | SYSTOLIC BLOOD PRESSURE: 125 MMHG

## 2019-07-11 DIAGNOSIS — M65.342 TRIGGER FINGER, LEFT RING FINGER: Primary | ICD-10-CM

## 2019-07-11 PROCEDURE — NC001 PR NO CHARGE: Performed by: ORTHOPAEDIC SURGERY

## 2019-07-11 PROCEDURE — 26055 INCISE FINGER TENDON SHEATH: CPT | Performed by: ORTHOPAEDIC SURGERY

## 2019-07-11 RX ORDER — SENNOSIDES 8.6 MG
650 CAPSULE ORAL EVERY 8 HOURS
Qty: 15 TABLET | Refills: 0 | Status: SHIPPED | OUTPATIENT
Start: 2019-07-11 | End: 2019-07-16

## 2019-07-11 RX ORDER — HYDROCODONE BITARTRATE AND ACETAMINOPHEN 5; 325 MG/1; MG/1
1 TABLET ORAL EVERY 6 HOURS PRN
Qty: 5 TABLET | Refills: 0 | Status: SHIPPED | OUTPATIENT
Start: 2019-07-11 | End: 2019-07-19 | Stop reason: ALTCHOICE

## 2019-07-11 RX ORDER — NAPROXEN SODIUM 220 MG
220 TABLET ORAL 2 TIMES DAILY WITH MEALS
Qty: 10 TABLET | Refills: 0 | Status: SHIPPED | OUTPATIENT
Start: 2019-07-11 | End: 2019-07-19 | Stop reason: ALTCHOICE

## 2019-07-11 RX ORDER — LIDOCAINE HYDROCHLORIDE AND EPINEPHRINE 10; 10 MG/ML; UG/ML
20 INJECTION, SOLUTION INFILTRATION; PERINEURAL ONCE
Status: COMPLETED | OUTPATIENT
Start: 2019-07-11 | End: 2019-07-11

## 2019-07-11 RX ADMIN — LIDOCAINE HYDROCHLORIDE,EPINEPHRINE BITARTRATE 5 ML: 10; .01 INJECTION, SOLUTION INFILTRATION; PERINEURAL at 11:50

## 2019-07-11 NOTE — OP NOTE
OPERATIVE REPORT  PATIENT NAME: Carolyn Barclay  :  1950  MRN: [id number]  Pt Location: [facility] MAIN OR    SURGERY DATE: 19    Surgeon(s) and Role:     * Wanda Tinsley MD - Primary     * Ward Chan - Assisting    Pre-Op Diagnosis:  Trigger finger, left ring finger [M65 342]    Post-Op Diagnosis Codes:     * Trigger finger, left ring finger [M65 342]    Procedure(s):  RELEASE TRIGGER FINGER left ring (Left)    Specimen(s):  * No orders in the log *    Estimated Blood Loss:   Minimal      Anesthesia Type:   Local    Operative Indications: The patient has a history of Trigger Finger  left  ring finger that was recalcitrant to conservative management  The decision was made to bring the patient to the operating room for Trigger Finger Release  left  ring finger  Risks of the procedure were explained which include, but are not limited to bleeding; infection; damage to nerves, arteries,veins, tendons; scar; pain; need for reoperation; failure to give desired result; and risks of anaesthesia  All questions were answered to satisfaction and they were willing to proceed  Operative Findings:  Left ring trigger finger    Complications:   None    Procedure and Technique:  After the patient, site, and procedure were identified, the patient was brought into the operating room in a supine position  Local anaesthesia was adminstered in the preoperative holding area  A tourniquet was not used  The  left upper extremity was then prepped and drapped in a normal, sterile, orthopedic fashion  After the patient, site, and procedure were once again identified, attention was turned to the left ring finger  An incision was made over the flexor tendon sheath at the level of the A1 pulley  Dissection was carried out in-line with the flexor tendon sheath and the radial and ulnar digital artery and nerve were protected  The A1 pulley was identified at the base of the incision    Under direct visualization, the A1 pulley was divided along the midline in its entirety with care taken to avoid injury to the underlying tendon  The tendons were examined to ensure that no further catching, popping, clicking or locking occurred with motion of the finger  At the completion of the procedure, hemostasis was obtained with cautery and direct pressure  The wounds were copiously irrigated with sterile solution  The wounds were closed with Prolene  Sterile dressings were applied, including Xeroform, gauze, tweeners, webril, ACE  Please note, all sponge, needle, and instrument counts were correct prior to closure  Loupe magnification was utilized  The patient tolerated the procedure well       I was present for the entire procedure    Patient Disposition:  APU and hemodynamically stable    SIGNATURE: Judy Forte MD  DATE: 07/11/19  TIME: 12:30 PM

## 2019-07-11 NOTE — H&P
H&P Exam - Orthopedics   Leisa Armijo 77 y o  female MRN: [id number]  Unit/Bed#: OR POOL    Assessment/Plan   Assessment:  L ring TF  Plan:  L ring TFR today    History of Present Illness   HPI:  Leisa Armijo is a 77 y o  y o  female who presents with continued c/o L ring trigger finger  Describes pain, stiffness and locking for approximately 3 months  Historical Information  Review Of Systems:   · Skin: Normal  · Neuro: See HPI  · Musculoskeletal: See HPI  · 14 point review of systems negative except as stated above     Past Medical History:   Past Medical History:   Diagnosis Date    Asthma     Disease of thyroid gland     GERD (gastroesophageal reflux disease)     Migraine     Raynaud disease     Sleep apnea     Visual disturbance        Past Surgical History:   Past Surgical History:   Procedure Laterality Date    COLON SURGERY      HALLUX VALGUS CORRECTION Right 06/19/2007    INCISION TENDON SHEATH HAND  05/23/2011    Release of A1 pulley    KNEE ARTHROSCOPY W/ MENISCAL REPAIR Left 2003    CT ESOPHAGOGASTRODUODENOSCOPY TRANSORAL DIAGNOSTIC N/A 8/22/2018    Procedure: ESOPHAGOGASTRODUODENOSCOPY (EGD);   Surgeon: Mignon Lyman MD;  Location:  MAIN OR;  Service: Gastroenterology    UPPER GASTROINTESTINAL ENDOSCOPY         Family History:  Family history reviewed and non-contributory  Family History   Problem Relation Age of Onset    Lung cancer Mother     Stomach cancer Father     Other Maternal Grandmother         Stroke syndrome    Other Maternal Aunt         Cardiomegaly       Social History:  Social History     Socioeconomic History    Marital status: /Civil Union     Spouse name: None    Number of children: None    Years of education: None    Highest education level: None   Occupational History    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)   Social Needs    Financial resource strain: None    Food insecurity:     Worry: None     Inability: None    Transportation needs:     Medical: None     Non-medical: None   Tobacco Use    Smoking status: Never Smoker    Smokeless tobacco: Never Used   Substance and Sexual Activity    Alcohol use: Yes     Alcohol/week: 5 0 standard drinks     Types: 5 Cans of beer per week     Drinks per session: 1 or 2     Binge frequency: Never    Drug use: No    Sexual activity: None   Lifestyle    Physical activity:     Days per week: None     Minutes per session: None    Stress: None   Relationships    Social connections:     Talks on phone: None     Gets together: None     Attends Taoism service: None     Active member of club or organization: None     Attends meetings of clubs or organizations: None     Relationship status: None    Intimate partner violence:     Fear of current or ex partner: None     Emotionally abused: None     Physically abused: None     Forced sexual activity: None   Other Topics Concern    None   Social History Narrative    Daily coffee consumption (1 cup/day)    Patient has living will    Uses safety equipment - seatbelts       Allergies: Allergies   Allergen Reactions    Lumigan [Bimatoprost] Itching    Pneumococcal Polysaccharide Vaccine Fever     Other reaction(s): Chills, fever and mottling  Category: Allergy;            Labs:  0   Lab Value Date/Time    HCT 39 9 09/07/2018 0938    HCT 39 0 04/04/2017 1043    HCT 41 3 02/18/2014 0911    HGB 12 9 09/07/2018 0938    HGB 13 0 04/04/2017 1043    HGB 13 6 02/18/2014 0911    WBC 6 1 09/07/2018 0938    WBC 5 5 04/04/2017 1043    WBC >30 (A) 01/20/2016 0000    WBC 4 92 02/18/2014 0911    ESR 5 03/11/2015 1207       Meds:  No current facility-administered medications for this encounter  Blood Culture:   No results found for: BLOODCX    Wound Culture:   No results found for: WOUNDCULT    Ins and Outs:  No intake/output data recorded  Physical Exam  There were no vitals taken for this visit    There were no vitals taken for this visit  Gen: Alert and oriented to person, place, time  HEENT: EOMI, eyes clear, moist mucus membranes, hearing intact  Respiratory: Bilateral chest rise   No audible wheezing found  Cardiovascular: Regular Rate and Rhythm  Abdomen: soft nontender/nondistended  Ortho Exam: TTP A1 pulley L ring finger with + nodule and + triggering  Neuro Exam: Sensation and motor grossly intact    Lab Results: Reviewed  Imaging: Reviewed

## 2019-07-11 NOTE — DISCHARGE INSTRUCTIONS
Post Operative Instructions    You have had surgery on your arm today, please read and follow the information below:  · Elevate your hand above your elbow during the next 24-48 hours to help with swelling  · Place your hand and arm over your head with motion at your shoulder three times a day  · Do not apply any cream/ointment/oil to your incisions including antibiotics  · Do not soak your hands in standing water (dishwater, tubs, Jacuzzi's, pools, etc ) until given permission (typically 2-3 weeks after injury)    Call the office if you notice any:  · Increased numbness or tingling of your hand or fingers that is not relieved with elevation  · Increasing pain that is not controlled with medication  · Difficulty chewing, breathing, swallowing  · Chest pains or shortness of breath  · Fever over 101 4 degrees  Bandage: Remove bandage after 5 days  Motion: Move fingers into a fist 5 times a day, DO NOT move any splinted fingers  Weight bearing status: Avoid heavy lifting (>5 pounds) with the extremity that was operated on until follow up appointment  Normal activities of daily living are OK  Ice: Ice for 10 minutes every hour as needed for swelling x 24 hours  Sling: No sling necessary  Medications:   Naproxen 220 mg two times a day   Tylenol Extended Release 650 mg every 8 hours  Norco/Hydrocodone one tab every 6 hours AS NEEDED for pain     Follow-up Appointment: 7-10 days  Please call the office if you have any questions or concerns regarding your post-operative care

## 2019-07-19 ENCOUNTER — OFFICE VISIT (OUTPATIENT)
Dept: FAMILY MEDICINE CLINIC | Facility: CLINIC | Age: 69
End: 2019-07-19
Payer: COMMERCIAL

## 2019-07-19 VITALS
HEART RATE: 74 BPM | SYSTOLIC BLOOD PRESSURE: 140 MMHG | WEIGHT: 172.5 LBS | OXYGEN SATURATION: 94 % | DIASTOLIC BLOOD PRESSURE: 80 MMHG | BODY MASS INDEX: 31.74 KG/M2 | HEIGHT: 62 IN | TEMPERATURE: 98.4 F

## 2019-07-19 DIAGNOSIS — I10 ESSENTIAL HYPERTENSION: ICD-10-CM

## 2019-07-19 DIAGNOSIS — E66.9 OBESITY (BMI 30.0-34.9): ICD-10-CM

## 2019-07-19 DIAGNOSIS — S46.812A STRAIN OF LEFT TRAPEZIUS MUSCLE, INITIAL ENCOUNTER: Primary | ICD-10-CM

## 2019-07-19 PROCEDURE — 1160F RVW MEDS BY RX/DR IN RCRD: CPT | Performed by: FAMILY MEDICINE

## 2019-07-19 PROCEDURE — 1101F PT FALLS ASSESS-DOCD LE1/YR: CPT | Performed by: FAMILY MEDICINE

## 2019-07-19 PROCEDURE — 99214 OFFICE O/P EST MOD 30 MIN: CPT | Performed by: FAMILY MEDICINE

## 2019-07-19 PROCEDURE — 3008F BODY MASS INDEX DOCD: CPT | Performed by: FAMILY MEDICINE

## 2019-07-19 NOTE — PROGRESS NOTES
Subjective:   Chief Complaint   Patient presents with    discomfort     pt states 2 weeks ago she noticed a discomfort on the left side of her upper chest area that go around her shoulder to her upper back shoulder area, pr also states she had a sharp pain in her lower left leg        Patient ID: Melody Roberts is a 77 y o  female  Pt here with  today  Complains of pain on left side of chest and left back  Must sleep on left side on a foam wedge due to reflux and sleep apnea  Notices symptoms during the night and when waking up in the am  Denies any symptoms with shortness of breath, diaphoresis, nausea  July symptoms started , denies any change with exertion, denies any injury  Had recent trigger finger repair, went well   Will have sutures removed on Monday by hand surgeon        The following portions of the patient's history were reviewed and updated as appropriate: allergies, current medications, past family history, past medical history, past social history, past surgical history and problem list     Review of Systems   Constitutional: Negative  Negative for fatigue and fever  HENT: Negative  Eyes: Negative  Respiratory: Negative  Negative for cough  Cardiovascular: Negative  Gastrointestinal: Negative  Endocrine: Negative  Genitourinary: Negative  Musculoskeletal: Positive for myalgias  Skin: Positive for wound  Sutures right hand from surgery   Allergic/Immunologic: Negative  Neurological: Negative  Psychiatric/Behavioral: Negative  Objective:  Vitals:    07/19/19 1357 07/19/19 1432   BP: 140/88 140/80   Pulse: 74    Temp: 98 4 °F (36 9 °C)    SpO2: 94%    Weight: 78 2 kg (172 lb 8 oz)    Height: 5' 2" (1 575 m)       Physical Exam   Constitutional: She is oriented to person, place, and time  She appears well-developed and well-nourished  HENT:   Head: Normocephalic and atraumatic     Cardiovascular: Normal rate, regular rhythm and normal heart sounds  Pulmonary/Chest: Effort normal and breath sounds normal    Abdominal: Soft  Bowel sounds are normal    Musculoskeletal: She exhibits tenderness  Tenderness left trapezius, reproducible pain, just beneath left scapula  No reproducible tenderness anteriorly  Neurological: She is alert and oriented to person, place, and time  Skin: Skin is warm and dry  Sutures intact left hand from trigger point repair, no signs of infection, no drainage  Psychiatric: She has a normal mood and affect  Her behavior is normal  Judgment and thought content normal    Nursing note and vitals reviewed  Diabetic Foot Exam      Assessment/Plan:    No problem-specific Assessment & Plan notes found for this encounter  Diagnoses and all orders for this visit:    Strain of left trapezius muscle, initial encounter  Comments:  biofreeze, thermacare patch, softer area for shoulder when sleeping, consider tens unit with physical therpay     Essential hypertension  Comments:  borderline, may need medication adjustment, pt will montior with home bp cuff    Obesity (BMI 30 0-34  9)  Comments:  see bmi plan    BMI 31 0-31 9,adult        BMI Counseling: Body mass index is 31 55 kg/m²  Discussed the patient's BMI with her  The BMI is above average  BMI counseling and education was provided to the patient  Nutrition recommendations include reducing portion sizes and 3-5 servings of fruits/vegetables daily  Exercise recommendations include exercising 3-5 times per week  BMI Counseling: Body mass index is 31 55 kg/m²  Discussed the patient's BMI with her  The BMI is above average  BMI counseling and education was provided to the patient  Nutrition recommendations include reducing portion sizes and 3-5 servings of fruits/vegetables daily  Exercise recommendations include exercising 3-5 times per week

## 2019-07-19 NOTE — PATIENT INSTRUCTIONS
Melatonin  Heat 15 min prior to bedtime / thermacare patches  biofreeze over area  Consider physical therapy and trial tens unit  Obesity   AMBULATORY CARE:   Obesity  is when your body mass index (BMI) is greater than 30  Your healthcare provider will use your height and weight to measure your BMI  The risks of obesity include  many health problems, such as injuries or physical disability  You may need tests to check for the following:  · Diabetes     · High blood pressure or high cholesterol     · Heart disease     · Gallbladder or liver disease     · Cancer of the colon, breast, prostate, liver, or kidney     · Sleep apnea     · Arthritis or gout  Seek care immediately if:   · You have a severe headache, confusion, or difficulty speaking  · You have weakness on one side of your body  · You have chest pain, sweating, or shortness of breath  Contact your healthcare provider if:   · You have symptoms of gallbladder or liver disease, such as pain in your upper abdomen  · You have knee or hip pain and discomfort while walking  · You have symptoms of diabetes, such as intense hunger and thirst, and frequent urination  · You have symptoms of sleep apnea, such as snoring or daytime sleepiness  · You have questions or concerns about your condition or care  Treatment for obesity  focuses on helping you lose weight to improve your health  Even a small decrease in BMI can reduce the risk for many health problems  Your healthcare provider will help you set a weight-loss goal   · Lifestyle changes  are the first step in treating obesity  These include making healthy food choices and getting regular physical activity  Your healthcare provider may suggest a weight-loss program that involves coaching, education, and therapy  · Medicine  may help you lose weight when it is used with a healthy diet and physical activity       · Surgery  can help you lose weight if you are very obese and have other health problems  There are several types of weight-loss surgery  Ask your healthcare provider for more information  Be successful losing weight:   · Set small, realistic goals  An example of a small goal is to walk for 20 minutes 5 days a week  Anther goal is to lose 5% of your body weight  · Tell friends, family members, and coworkers about your goals  and ask for their support  Ask a friend to lose weight with you, or join a weight-loss support group  · Identify foods or triggers that may cause you to overeat , and find ways to avoid them  Remove tempting high-calorie foods from your home and workplace  Place a bowl of fresh fruit on your kitchen counter  If stress causes you to eat, then find other ways to cope with stress  · Keep a diary to track what you eat and drink  Also write down how many minutes of physical activity you do each day  Weigh yourself once a week and record it in your diary  Eating changes: You will need to eat 500 to 1,000 fewer calories each day than you currently eat to lose 1 to 2 pounds a week  The following changes will help you cut calories:  · Eat smaller portions  Use small plates, no larger than 9 inches in diameter  Fill your plate half full of fruits and vegetables  Measure your food using measuring cups until you know what a serving size looks like  · Eat 3 meals and 1 or 2 snacks each day  Plan your meals in advance  Fausto Fan and eat at home most of the time  Eat slowly  · Eat fruits and vegetables at every meal   They are low in calories and high in fiber, which makes you feel full  Do not add butter, margarine, or cream sauce to vegetables  Use herbs to season steamed vegetables  · Eat less fat and fewer fried foods  Eat more baked or grilled chicken and fish  These protein sources are lower in calories and fat than red meat  Limit fast food  Dress your salads with olive oil and vinegar instead of bottled dressing       · Limit the amount of sugar you eat   Do not drink sugary beverages  Limit alcohol  Activity changes:  Physical activity is good for your body in many ways  It helps you burn calories and build strong muscles  It decreases stress and depression, and improves your mood  It can also help you sleep better  Talk to your healthcare provider before you begin an exercise program   · Exercise for at least 30 minutes 5 days a week  Start slowly  Set aside time each day for physical activity that you enjoy and that is convenient for you  It is best to do both weight training and an activity that increases your heart rate, such as walking, bicycling, or swimming  · Find ways to be more active  Do yard work and housecleaning  Walk up the stairs instead of using elevators  Spend your leisure time going to events that require walking, such as outdoor festivals or fairs  This extra physical activity can help you lose weight and keep it off  Follow up with your healthcare provider as directed: You may need to meet with a dietitian  Write down your questions so you remember to ask them during your visits  © 2017 [facility] Information is for End User's use only and may not be sold, redistributed or otherwise used for commercial purposes  All illustrations and images included in CareNotes® are the copyrighted property of [employer]  or Tyler Salvador  The above information is an  only  It is not intended as medical advice for individual conditions or treatments  Talk to your doctor, nurse or pharmacist before following any medical regimen to see if it is safe and effective for you

## 2019-07-20 PROBLEM — E66.811 OBESITY (BMI 30.0-34.9): Status: ACTIVE | Noted: 2019-07-20

## 2019-07-20 PROBLEM — E66.9 OBESITY (BMI 30.0-34.9): Status: ACTIVE | Noted: 2019-07-20

## 2019-07-22 ENCOUNTER — OFFICE VISIT (OUTPATIENT)
Dept: OBGYN CLINIC | Facility: CLINIC | Age: 69
End: 2019-07-22

## 2019-07-22 VITALS
SYSTOLIC BLOOD PRESSURE: 166 MMHG | WEIGHT: 175.8 LBS | DIASTOLIC BLOOD PRESSURE: 82 MMHG | HEIGHT: 62 IN | HEART RATE: 72 BPM | BODY MASS INDEX: 32.35 KG/M2

## 2019-07-22 DIAGNOSIS — M65.342 TRIGGER FINGER, LEFT RING FINGER: Primary | ICD-10-CM

## 2019-07-22 PROCEDURE — 99024 POSTOP FOLLOW-UP VISIT: CPT | Performed by: ORTHOPAEDIC SURGERY

## 2019-07-22 NOTE — PROGRESS NOTES
Assessment:   S/P L ring TFR 07/11/19    Plan:   Resume activities as tolerated and scar tissue massage    Follow Up:  PRN      CHIEF COMPLAINT:  Chief Complaint   Patient presents with    Right Ring Finger - Follow-up         SUBJECTIVE:  Americo Cooper is a 77 y o  female who presents for follow up S/P L ring TFR 07/11/19  Patient states overall she is doing very well  No concerning pain, no clicking/locking        PHYSICAL EXAMINATION:  Vital signs: /82   Pulse 72   Ht 5' 2" (1 575 m)   Wt 79 7 kg (175 lb 12 8 oz)   BMI 32 15 kg/m²   General: well developed and well nourished, alert, oriented times 3 and appears comfortable  Psychiatric: Normal    MUSCULOSKELETAL EXAMINATION:  Incision: Clean, dry, intact  Range of Motion: As expected, no clicking/locking  Neurovascular status: Neuro intact, good cap refill  Activity Restrictions: No restrictions  Done today: Sutures out and Steri strips applied      STUDIES REVIEWED:  No Studies to review      PROCEDURES PERFORMED:  Procedures  No Procedures performed today

## 2019-07-24 ENCOUNTER — TELEPHONE (OUTPATIENT)
Dept: OBGYN CLINIC | Facility: HOSPITAL | Age: 69
End: 2019-07-24

## 2019-07-24 NOTE — TELEPHONE ENCOUNTER
Patient advised to start massages as long as the incision is fully healed, she verbalized understanding  Patient stated she would like to come in and  her op notes  Called the office and was told to send task to Dr Janel Ybarra and Galen Liu to get this to the right person to be done  Please call patient when she may    Thank you

## 2019-07-24 NOTE — TELEPHONE ENCOUNTER
Caller: Americo Geller  Call Back Number: [phone]  Provider: Dr Lucillie Cabot    Patient called and asked for clarification on if she may use the Vitamin E Cream right away or should wait a day or two after sutras are removed?     Please advise, thank you

## 2019-08-21 ENCOUNTER — TELEPHONE (OUTPATIENT)
Dept: FAMILY MEDICINE CLINIC | Facility: CLINIC | Age: 69
End: 2019-08-21

## 2019-08-21 NOTE — TELEPHONE ENCOUNTER
Pt states she has Lice from her granddaughter, pt states she has a nix kit and the warning signs say if you are allergic to ragweed do not use could cause asthmatic systems, and or  breathing difficultly, pt states she had hay fever many years ago, pt states the primary ingredient is permethrin cream  Pt would like to know would this be safe for her to use

## 2019-10-02 DIAGNOSIS — F41.9 ANXIETY: ICD-10-CM

## 2019-10-02 DIAGNOSIS — I10 ESSENTIAL HYPERTENSION: ICD-10-CM

## 2019-10-02 DIAGNOSIS — E03.9 ACQUIRED HYPOTHYROIDISM: Primary | ICD-10-CM

## 2019-10-02 DIAGNOSIS — E03.9 ACQUIRED HYPOTHYROIDISM: ICD-10-CM

## 2019-10-02 DIAGNOSIS — K44.9 HIATAL HERNIA: ICD-10-CM

## 2019-10-02 DIAGNOSIS — E78.49 OTHER HYPERLIPIDEMIA: ICD-10-CM

## 2019-10-02 DIAGNOSIS — R73.01 IMPAIRED FASTING GLUCOSE: ICD-10-CM

## 2019-10-02 RX ORDER — CITALOPRAM 40 MG/1
40 TABLET ORAL DAILY
Qty: 90 TABLET | Refills: 0 | Status: SHIPPED | OUTPATIENT
Start: 2019-10-02 | End: 2019-12-31

## 2019-10-02 RX ORDER — LEVOTHYROXINE SODIUM 0.03 MG/1
25 TABLET ORAL DAILY
Qty: 90 TABLET | Refills: 0 | Status: SHIPPED | OUTPATIENT
Start: 2019-10-02 | End: 2020-03-04 | Stop reason: SDUPTHER

## 2019-10-02 RX ORDER — OMEPRAZOLE 40 MG/1
40 CAPSULE, DELAYED RELEASE ORAL DAILY
Qty: 90 CAPSULE | Refills: 0 | Status: SHIPPED | OUTPATIENT
Start: 2019-10-02 | End: 2020-03-26

## 2019-10-04 ENCOUNTER — TELEPHONE (OUTPATIENT)
Dept: FAMILY MEDICINE CLINIC | Facility: CLINIC | Age: 69
End: 2019-10-04

## 2019-10-15 DIAGNOSIS — E03.9 ACQUIRED HYPOTHYROIDISM: ICD-10-CM

## 2019-10-15 RX ORDER — LEVOTHYROXINE SODIUM 0.03 MG/1
TABLET ORAL
Qty: 90 TABLET | Refills: 0 | Status: SHIPPED | OUTPATIENT
Start: 2019-10-15 | End: 2019-11-01 | Stop reason: ALTCHOICE

## 2019-10-17 LAB
ALBUMIN SERPL-MCNC: 4.5 G/DL (ref 3.5–4.8)
ALBUMIN/GLOB SERPL: 1.8 {RATIO} (ref 1.2–2.2)
ALP SERPL-CCNC: 51 IU/L (ref 39–117)
ALT SERPL-CCNC: 20 IU/L (ref 0–32)
AST SERPL-CCNC: 23 IU/L (ref 0–40)
BASOPHILS # BLD AUTO: 0 X10E3/UL (ref 0–0.2)
BASOPHILS NFR BLD AUTO: 1 %
BILIRUB SERPL-MCNC: 0.4 MG/DL (ref 0–1.2)
BUN SERPL-MCNC: 14 MG/DL (ref 8–27)
BUN/CREAT SERPL: 18 (ref 12–28)
CALCIUM SERPL-MCNC: 9.6 MG/DL (ref 8.7–10.3)
CHLORIDE SERPL-SCNC: 101 MMOL/L (ref 96–106)
CHOLEST SERPL-MCNC: 216 MG/DL (ref 100–199)
CO2 SERPL-SCNC: 24 MMOL/L (ref 20–29)
CREAT SERPL-MCNC: 0.76 MG/DL (ref 0.57–1)
EOSINOPHIL # BLD AUTO: 0.2 X10E3/UL (ref 0–0.4)
EOSINOPHIL NFR BLD AUTO: 4 %
ERYTHROCYTE [DISTWIDTH] IN BLOOD BY AUTOMATED COUNT: 13.6 % (ref 12.3–15.4)
GLOBULIN SER-MCNC: 2.5 G/DL (ref 1.5–4.5)
GLUCOSE SERPL-MCNC: 107 MG/DL (ref 65–99)
HCT VFR BLD AUTO: 40.1 % (ref 34–46.6)
HDLC SERPL-MCNC: 58 MG/DL
HGB BLD-MCNC: 13.3 G/DL (ref 11.1–15.9)
IMM GRANULOCYTES # BLD: 0 X10E3/UL (ref 0–0.1)
IMM GRANULOCYTES NFR BLD: 0 %
LDLC SERPL CALC-MCNC: 133 MG/DL (ref 0–99)
LDLC/HDLC SERPL: 2.3 RATIO (ref 0–3.2)
LYMPHOCYTES # BLD AUTO: 2.2 X10E3/UL (ref 0.7–3.1)
LYMPHOCYTES NFR BLD AUTO: 34 %
MCH RBC QN AUTO: 30.9 PG (ref 26.6–33)
MCHC RBC AUTO-ENTMCNC: 33.2 G/DL (ref 31.5–35.7)
MCV RBC AUTO: 93 FL (ref 79–97)
MONOCYTES # BLD AUTO: 0.8 X10E3/UL (ref 0.1–0.9)
MONOCYTES NFR BLD AUTO: 12 %
NEUTROPHILS # BLD AUTO: 3.2 X10E3/UL (ref 1.4–7)
NEUTROPHILS NFR BLD AUTO: 49 %
PLATELET # BLD AUTO: 277 X10E3/UL (ref 150–450)
POTASSIUM SERPL-SCNC: 4.1 MMOL/L (ref 3.5–5.2)
PROT SERPL-MCNC: 7 G/DL (ref 6–8.5)
RBC # BLD AUTO: 4.31 X10E6/UL (ref 3.77–5.28)
SL AMB EGFR AFRICAN AMERICAN: 91 ML/MIN/1.73
SL AMB EGFR NON AFRICAN AMERICAN: 79 ML/MIN/1.73
SL AMB VLDL CHOLESTEROL CALC: 25 MG/DL (ref 5–40)
SODIUM SERPL-SCNC: 141 MMOL/L (ref 134–144)
TRIGL SERPL-MCNC: 127 MG/DL (ref 0–149)
TSH SERPL DL<=0.005 MIU/L-ACNC: 3.76 UIU/ML (ref 0.45–4.5)
WBC # BLD AUTO: 6.4 X10E3/UL (ref 3.4–10.8)

## 2019-11-01 ENCOUNTER — OFFICE VISIT (OUTPATIENT)
Dept: FAMILY MEDICINE CLINIC | Facility: CLINIC | Age: 69
End: 2019-11-01
Payer: COMMERCIAL

## 2019-11-01 VITALS
BODY MASS INDEX: 32.39 KG/M2 | HEART RATE: 76 BPM | DIASTOLIC BLOOD PRESSURE: 64 MMHG | HEIGHT: 62 IN | OXYGEN SATURATION: 94 % | TEMPERATURE: 98.1 F | WEIGHT: 176 LBS | SYSTOLIC BLOOD PRESSURE: 130 MMHG

## 2019-11-01 DIAGNOSIS — R14.0 ABDOMINAL BLOATING: ICD-10-CM

## 2019-11-01 DIAGNOSIS — S30.861A TICK BITE OF ABDOMEN, INITIAL ENCOUNTER: ICD-10-CM
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
## 2019-11-01 DIAGNOSIS — R13.12 OROPHARYNGEAL DYSPHAGIA: ICD-10-CM

## 2019-11-01 DIAGNOSIS — Z00.00 WELL ADULT EXAM: Primary | ICD-10-CM

## 2019-11-01 DIAGNOSIS — Z00.00 MEDICARE ANNUAL WELLNESS VISIT, SUBSEQUENT: ICD-10-CM

## 2019-11-01 PROCEDURE — G0439 PPPS, SUBSEQ VISIT: HCPCS | Performed by: FAMILY MEDICINE

## 2019-11-01 PROCEDURE — 1125F AMNT PAIN NOTED PAIN PRSNT: CPT | Performed by: FAMILY MEDICINE

## 2019-11-01 PROCEDURE — 1170F FXNL STATUS ASSESSED: CPT | Performed by: FAMILY MEDICINE

## 2019-11-01 PROCEDURE — 3008F BODY MASS INDEX DOCD: CPT | Performed by: FAMILY MEDICINE

## 2019-11-01 RX ORDER — DOXYCYCLINE 100 MG/1
100 CAPSULE ORAL 2 TIMES DAILY
Qty: 20 CAPSULE | Refills: 0 | Status: SHIPPED | OUTPATIENT
Start: 2019-11-01 | End: 2019-11-11

## 2019-11-01 NOTE — PATIENT INSTRUCTIONS
Tick bite: doxycycline 100mg 1 tab twice a day   Observe for any symptoms, flu-like symptoms, joint aches, headache, neck pain   Dr Carol Magana evaluation for dyphagia,   Schedule pap   Schedule pelvic ultrasound for abdominal bloating   The screening/preventive services that you may require over the next 5-10 years are detailed below  Some tests may not apply to you based off risk factors and/or age  Screening tests ordered at today's visit but not completed yet may show as past due  Also, please note that scanned in results may not display below  Preventive Screenings:  Service Recommendations Previous Testing/Comments   Colorectal Cancer Screening  * Colonoscopy    * Fecal Occult Blood Test (FOBT)/Fecal Immunochemical Test (FIT)  * Fecal DNA/Cologuard Test  * Flexible Sigmoidoscopy Age: 54-65 years old   Colonoscopy: every 10 years (may be performed more frequently if at higher risk)  OR  FOBT/FIT: every 1 year  OR  Cologuard: every 3 years  OR  Sigmoidoscopy: every 5 years  Screening may be recommended earlier than age 48 if at higher risk for colorectal cancer  Also, an individualized decision between you and your healthcare provider will decide whether screening between the ages of 74-80 would be appropriate  Colonoscopy: 06/13/2012  FOBT/FIT: Not on file  Cologuard: Not on file  Sigmoidoscopy: Not on file    Screening Current     Breast Cancer Screening Age: 36 years old  Frequency: every 1-2 years  Not required if history of left and right mastectomy Mammogram: 06/12/2019    Screening Current   Cervical Cancer Screening Between the ages of 21-29, pap smear recommended once every 3 years  Between the ages of 33-67, can perform pap smear with HPV co-testing every 5 years     Recommendations may differ for women with a history of total hysterectomy, cervical cancer, or abnormal pap smears in past  Pap Smear: Not on file    Screening Not Indicated   Hepatitis C Screening Once for adults born between 1945 and 1965  More frequently in patients at high risk for Hepatitis C Hep C Antibody: 09/07/2018    Screening Current   Diabetes Screening 1-2 times per year if you're at risk for diabetes or have pre-diabetes Fasting glucose: No results in last 5 years   A1C: 6 0 %    Screening Current   Cholesterol Screening Once every 5 years if you don't have a lipid disorder  May order more often based on risk factors  Lipid panel: 10/16/2019    Screening Not Indicated  History Lipid Disorder     Other Preventive Screenings Covered by Medicare:  1  Abdominal Aortic Aneurysm (AAA) Screening: covered once if your at risk  You're considered to be at risk if you have a family history of AAA  2  Lung Cancer Screening: covers low dose CT scan once per year if you meet all of the following conditions: (1) Age 50-69; (2) No signs or symptoms of lung cancer; (3) Current smoker or have quit smoking within the last 15 years; (4) You have a tobacco smoking history of at least 30 pack years (packs per day multiplied by number of years you smoked); (5) You get a written order from a healthcare provider  3  Glaucoma Screening: covered annually if you're considered high risk: (1) You have diabetes OR (2) Family history of glaucoma OR (3)  aged 48 and older OR (3)  American aged 72 and older  3  Osteoporosis Screening: covered every 2 years if you meet one of the following conditions: (1) You're estrogen deficient and at risk for osteoporosis based off medical history and other findings; (2) Have a vertebral abnormality; (3) On glucocorticoid therapy for more than 3 months; (4) Have primary hyperparathyroidism; (5) On osteoporosis medications and need to assess response to drug therapy  · Last bone density test (DXA Scan): 11/05/2013  5  HIV Screening: covered annually if you're between the age of 12-76  Also covered annually if you are younger than 13 and older than 72 with risk factors for HIV infection   For pregnant patients, it is covered up to 3 times per pregnancy  Immunizations:  Immunization Recommendations   Influenza Vaccine Annual influenza vaccination during flu season is recommended for all persons aged >= 6 months who do not have contraindications   Pneumococcal Vaccine (Prevnar and Pneumovax)  * Prevnar = PCV13  * Pneumovax = PPSV23   Adults 25-60 years old: 1-3 doses may be recommended based on certain risk factors  Adults 72 years old: Prevnar (PCV13) vaccine recommended followed by Pneumovax (PPSV23) vaccine  If already received PPSV23 since turning 65, then PCV13 recommended at least one year after PPSV23 dose  Hepatitis B Vaccine 3 dose series if at intermediate or high risk (ex: diabetes, end stage renal disease, liver disease)   Tetanus (Td) Vaccine - COST NOT COVERED BY MEDICARE PART B Following completion of primary series, a booster dose should be given every 10 years to maintain immunity against tetanus  Td may also be given as tetanus wound prophylaxis  Tdap Vaccine - COST NOT COVERED BY MEDICARE PART B Recommended at least once for all adults  For pregnant patients, recommended with each pregnancy  Shingles Vaccine (Shingrix) - COST NOT COVERED BY MEDICARE PART B  2 shot series recommended in those aged 48 and above     Health Maintenance Due:      Topic Date Due    MAMMOGRAM  06/12/2020    CRC Screening: Colonoscopy  06/13/2022    Hepatitis C Screening  Completed     Immunizations Due:      Topic Date Due    Pneumococcal Vaccine: 65+ Years (1 of 2 - PCV13) 08/24/2013    INFLUENZA VACCINE  07/01/2019     Advance Directives   What are advance directives? Advance directives are legal documents that state your wishes and plans for medical care  These plans are made ahead of time in case you lose your ability to make decisions for yourself  Advance directives can apply to any medical decision, such as the treatments you want, and if you want to donate organs     What are the types of advance directives? There are many types of advance directives, and each state has rules about how to use them  You may choose a combination of any of the following:  · Living will: This is a written record of the treatment you want  You can also choose which treatments you do not want, which to limit, and which to stop at a certain time  This includes surgery, medicine, IV fluid, and tube feedings  · Durable power of  for healthcare [facility]): This is a written record that states who you want to make healthcare choices for you when you are unable to make them for yourself  This person, called a proxy, is usually a family member or a friend  You may choose more than 1 proxy  · Do not resuscitate (DNR) order:  A DNR order is used in case your heart stops beating or you stop breathing  It is a request not to have certain forms of treatment, such as CPR  A DNR order may be included in other types of advance directives  · Medical directive: This covers the care that you want if you are in a coma, near death, or unable to make decisions for yourself  You can list the treatments you want for each condition  Treatment may include pain medicine, surgery, blood transfusions, dialysis, IV or tube feedings, and a ventilator (breathing machine)  · Values history: This document has questions about your views, beliefs, and how you feel and think about life  This information can help others choose the care that you would choose  Why are advance directives important? An advance directive helps you control your care  Although spoken wishes may be used, it is better to have your wishes written down  Spoken wishes can be misunderstood, or not followed  Treatments may be given even if you do not want them  An advance directive may make it easier for your family to make difficult choices about your care     Weight Management   Why it is important to manage your weight:  Being overweight increases your risk of health conditions such as heart disease, high blood pressure, type 2 diabetes, and certain types of cancer  It can also increase your risk for osteoarthritis, sleep apnea, and other respiratory problems  Aim for a slow, steady weight loss  Even a small amount of weight loss can lower your risk of health problems  How to lose weight safely:  A safe and healthy way to lose weight is to eat fewer calories and get regular exercise  You can lose up about 1 pound a week by decreasing the number of calories you eat by 500 calories each day  Healthy meal plan for weight management:  A healthy meal plan includes a variety of foods, contains fewer calories, and helps you stay healthy  A healthy meal plan includes the following:  · Eat whole-grain foods more often  A healthy meal plan should contain fiber  Fiber is the part of grains, fruits, and vegetables that is not broken down by your body  Whole-grain foods are healthy and provide extra fiber in your diet  Some examples of whole-grain foods are whole-wheat breads and pastas, oatmeal, brown rice, and bulgur  · Eat a variety of vegetables every day  Include dark, leafy greens such as spinach, kale, luis greens, and mustard greens  Eat yellow and orange vegetables such as carrots, sweet potatoes, and winter squash  · Eat a variety of fruits every day  Choose fresh or canned fruit (canned in its own juice or light syrup) instead of juice  Fruit juice has very little or no fiber  · Eat low-fat dairy foods  Drink fat-free (skim) milk or 1% milk  Eat fat-free yogurt and low-fat cottage cheese  Try low-fat cheeses such as mozzarella and other reduced-fat cheeses  · Choose meat and other protein foods that are low in fat  Choose beans or other legumes such as split peas or lentils  Choose fish, skinless poultry (chicken or turkey), or lean cuts of red meat (beef or pork)  Before you cook meat or poultry, cut off any visible fat  · Use less fat and oil    Try baking foods instead of frying them  Add less fat, such as margarine, sour cream, regular salad dressing and mayonnaise to foods  Eat fewer high-fat foods  Some examples of high-fat foods include french fries, doughnuts, ice cream, and cakes  · Eat fewer sweets  Limit foods and drinks that are high in sugar  This includes candy, cookies, regular soda, and sweetened drinks  Exercise:  Exercise at least 30 minutes per day on most days of the week  Some examples of exercise include walking, biking, dancing, and swimming  You can also fit in more physical activity by taking the stairs instead of the elevator or parking farther away from stores  Ask your healthcare provider about the best exercise plan for you  © Copyright [employer] 2018 Information is for End User's use only and may not be sold, redistributed or otherwise used for commercial purposes  All illustrations and images included in CareNotes® are the copyrighted property of [employer]  or [address] [place] [address]

## 2019-11-01 NOTE — PROGRESS NOTES
Assessment and Plan:     Problem List Items Addressed This Visit     None           Preventive health issues were discussed with patient, and age appropriate screening tests were ordered as noted in patient's After Visit Summary  Personalized health advice and appropriate referrals for health education or preventive services given if needed, as noted in patient's After Visit Summary  History of Present Illness:     Patient presents for Medicare Annual Wellness visit    Patient Care Team:  DO danelle Alvarado PCP - DO Gallito Batres DO     Problem List:     Patient Active Problem List   Diagnosis    Abnormal mammogram    Anxiety disorder    Glaucoma suspect    Hiatal hernia    Hyperlipidemia    Hypothyroidism    Impaired fasting glucose    Obstructive sleep apnea    Other age-related cataract    Raynaud's syndrome without gangrene    Reactive airway disease    Burping    Chronic fatigue    Rib sprain, initial encounter    Hypertension    Right wrist tendonitis    BMI 32 0-32 9,adult    Trigger finger, left ring finger    Strain of left trapezius muscle    Obesity (BMI 30 0-34  9)      Past Medical and Surgical History:     Past Medical History:   Diagnosis Date    Asthma     Disease of thyroid gland     GERD (gastroesophageal reflux disease)     Migraine     Raynaud disease     S/P trigger finger release     Sleep apnea     Visual disturbance      Past Surgical History:   Procedure Laterality Date    CATARACT EXTRACTION      COLON SURGERY      GLAUCOMA SURGERY      HALLUX VALGUS CORRECTION Right 06/19/2007    INCISION TENDON SHEATH HAND  05/23/2011    Release of A1 pulley    KNEE ARTHROSCOPY W/ MENISCAL REPAIR Left 2003    MS ESOPHAGOGASTRODUODENOSCOPY TRANSORAL DIAGNOSTIC N/A 8/22/2018    Procedure: ESOPHAGOGASTRODUODENOSCOPY (EGD);   Surgeon: Rubén Perez MD;  Location:  MAIN OR;  Service: Gastroenterology    MS INCISE FINGER TENDON SHEATH Left 7/11/2019    Procedure: RELEASE TRIGGER FINGER left ring;  Surgeon: Daniel Lyons MD;  Location:  MAIN OR;  Service: Orthopedics    TRIGGER FINGER RELEASE      UPPER GASTROINTESTINAL ENDOSCOPY        Family History:     Family History   Problem Relation Age of Onset    Lung cancer Mother     Stomach cancer Father     Other Maternal Grandmother         Stroke syndrome    Other Maternal Aunt         Cardiomegaly      Social History:     Social History     Socioeconomic History    Marital status: /Civil Union     Spouse name: None    Number of children: None    Years of education: None    Highest education level: None   Occupational History    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)   Social Needs    Financial resource strain: None    Food insecurity:     Worry: None     Inability: None    Transportation needs:     Medical: None     Non-medical: None   Tobacco Use    Smoking status: Never Smoker    Smokeless tobacco: Never Used   Substance and Sexual Activity    Alcohol use:  Yes     Alcohol/week: 5 0 standard drinks     Types: 5 Cans of beer per week     Drinks per session: 1 or 2     Binge frequency: Never    Drug use: No    Sexual activity: None   Lifestyle    Physical activity:     Days per week: None     Minutes per session: None    Stress: None   Relationships    Social connections:     Talks on phone: None     Gets together: None     Attends Anabaptist service: None     Active member of club or organization: None     Attends meetings of clubs or organizations: None     Relationship status: None    Intimate partner violence:     Fear of current or ex partner: None     Emotionally abused: None     Physically abused: None     Forced sexual activity: None   Other Topics Concern    None   Social History Narrative    Daily coffee consumption (1 cup/day)    Patient has living will    Uses safety equipment - seatbelts       Medications and Allergies:     Current Outpatient Medications   Medication Sig Dispense Refill    ALPRAZolam (XANAX) 1 mg tablet Take 1 tablet (1 mg total) by mouth 3 (three) times a day as needed for anxiety 90 tablet 0    amLODIPine (NORVASC) 5 mg tablet TAKE ONE TABLET BY MOUTH DAILY 90 tablet 1    aspirin 81 mg chewable tablet Chew 1 tablet daily      citalopram (CeleXA) 40 mg tablet Take 1 tablet (40 mg total) by mouth daily 90 tablet 0    diphenoxylate-atropine (LOMOTIL) 2 5-0 025 mg per tablet Take 1 tablet by mouth daily as needed      Iron-Vitamins (GERITOL COMPLETE PO) Take 1 tablet by mouth daily      levothyroxine 25 mcg tablet Take 1 tablet (25 mcg total) by mouth daily 90 tablet 0    omeprazole (PriLOSEC) 40 MG capsule Take 1 capsule (40 mg total) by mouth daily 90 capsule 0     No current facility-administered medications for this visit  Allergies   Allergen Reactions    Lumigan [Bimatoprost] Itching    Pneumococcal Polysaccharide Vaccine Fever     Other reaction(s): Chills, fever and mottling  Category: Allergy;       Immunizations:     Immunization History   Administered Date(s) Administered    H1N1, All Formulations 11/03/2009    Hep B, adult 01/07/1999, 02/09/1999, 08/10/1999    INFLUENZA 10/17/2018, 10/24/2019    Influenza Split High Dose Preservative Free IM 10/27/2015, 09/14/2016    Influenza TIV (IM) 1950, 10/15/2014    Pneumococcal Polysaccharide PPV23 01/01/2000    Tdap 01/01/2010, 10/12/2010    Zoster 02/02/2015      Health Maintenance:         Topic Date Due    MAMMOGRAM  06/12/2020    CRC Screening: Colonoscopy  06/13/2022    Hepatitis C Screening  Completed         Topic Date Due    Pneumococcal Vaccine: 65+ Years (1 of 2 - PCV13) 08/24/2013    INFLUENZA VACCINE  07/01/2019      Medicare Health Risk Assessment:     Ht 5' 2" (1 575 m)   Wt 79 8 kg (176 lb)   BMI 32 19 kg/m²      Mary Barriga is here for her Subsequent Wellness visit   Last Medicare Wellness visit information reviewed, patient interviewed and updates made to the record today  Health Risk Assessment:   Patient rates overall health as very good  Patient feels that their physical health rating is same  Eyesight was rated as same  Hearing was rated as same  Patient feels that their emotional and mental health rating is same  Pain experienced in the last 7 days has been some  Patient's pain rating has been 1/10  Patient states that she has experienced no weight loss or gain in last 6 months  Depression Screening:   PHQ-2 Score: 0      Fall Risk Screening: In the past year, patient has experienced: no history of falling in past year      Urinary Incontinence Screening:   Patient has not leaked urine accidently in the last six months  Home Safety:  Patient does not have trouble with stairs inside or outside of their home  Patient has working smoke alarms and has working carbon monoxide detector  Home safety hazards include: none  Nutrition:   Current diet is Regular  Medications:   Patient is currently taking over-the-counter supplements  OTC medications include: see medication list  Patient is able to manage medications  Activities of Daily Living (ADLs)/Instrumental Activities of Daily Living (IADLs):   Walk and transfer into and out of bed and chair?: Yes  Dress and groom yourself?: Yes    Bathe or shower yourself?: Yes    Feed yourself?  Yes  Do your laundry/housekeeping?: Yes  Manage your money, pay your bills and track your expenses?: Yes  Make your own meals?: Yes    Do your own shopping?: Yes    Previous Hospitalizations:   Any hospitalizations or ED visits within the last 12 months?: No      Advance Care Planning:     Five wishes given: Yes      PREVENTIVE SCREENINGS      Cardiovascular Screening:    General: Screening Not Indicated and History Lipid Disorder      Diabetes Screening:     General: Screening Current      Colorectal Cancer Screening:     General: Screening Current Breast Cancer Screening:     General: Screening Current      Cervical Cancer Screening:    General: Screening Not Indicated      Hepatitis C Screening:    General: Screening Current      Ilya Alcala DO

## 2019-11-01 NOTE — PROGRESS NOTES
Subjective     Ronna Blood is a 68 y o   female and is here for routine health maintenance  The patient reports no problems  History of Present Illness     Pt here for physical and medicare wellness  Noticed tick bite on left abdomen  Was able to remove  Denies any symptoms, no neck pain, no flu like symptoms  Complains of skin tag on mid back  Complains of left shoulder pain  Trouble sleeping on left side    No shortness of breath  Complains of trouble swallowing, feels like food gets stuck in throat  Had egd- normal           Well Adult Physical   Patient here for a comprehensive physical exam       Diet and Physical Activity  Diet: well balanced diet  Weight concerns: Patient has class 1 obesity (BMI 30-34  9)  Exercise: frequently      Depression Screen  PHQ-9 Depression Screening    PHQ-9:    Frequency of the following problems over the past two weeks:       Little interest or pleasure in doing things:  0 - not at all  Feeling down, depressed, or hopeless:  0 - not at all  PHQ-2 Score:  0          General Health  Hearing: Normal:  bilateral  Vision: no vision problems  Dental: regular dental visits     History:  LMP: No LMP recorded  Patient is postmenopausal       Cancer Screening  Colononoscopy 2012 10 year follow up  Mammogram 6/12/2019  Pap low risk  Abnormal pap? no  Smoker NO Annual screening with low-dose helical computed tomography (CT) for patients age 54 to 76 years with history of smoking at least 30 pack-years and, if a former smoker, had quit within the previous 15 years      The following portions of the patient's history were reviewed and updated as appropriate: allergies, current medications, past family history, past medical history, past social history, past surgical history and problem list     Review of Systems     Review of Systems   Constitutional: Negative  Negative for fatigue and fever  HENT: Negative  Eyes: Negative  Respiratory: Negative  Negative for cough  Cardiovascular: Negative  Gastrointestinal: Negative  Endocrine: Negative  Genitourinary: Negative  Musculoskeletal: Positive for arthralgias  Left shoulder pain   Skin: Negative  Skin tag  Tick bite left abdomen   Allergic/Immunologic: Negative  Neurological: Negative  Psychiatric/Behavioral: Negative  Past Medical History     Past Medical History:   Diagnosis Date    Asthma     Disease of thyroid gland     GERD (gastroesophageal reflux disease)     Migraine     Raynaud disease     S/P trigger finger release     Sleep apnea     Visual disturbance        Past Surgical History     Past Surgical History:   Procedure Laterality Date    CATARACT EXTRACTION      COLON SURGERY      GLAUCOMA SURGERY      HALLUX VALGUS CORRECTION Right 06/19/2007    INCISION TENDON SHEATH HAND  05/23/2011    Release of A1 pulley    KNEE ARTHROSCOPY W/ MENISCAL REPAIR Left 2003    CT ESOPHAGOGASTRODUODENOSCOPY TRANSORAL DIAGNOSTIC N/A 8/22/2018    Procedure: ESOPHAGOGASTRODUODENOSCOPY (EGD);   Surgeon: Justin Mendoza MD;  [facility];  Service: Gastroenterology    CT INCISE FINGER TENDON SHEATH Left 7/11/2019    Procedure: RELEASE TRIGGER FINGER left ring;  Surgeon: Rivka Padgett MD;  [facility];  Service: Orthopedics    TRIGGER FINGER RELEASE      UPPER GASTROINTESTINAL ENDOSCOPY         Social History     Social History     Socioeconomic History    Marital status: /Civil Union     Spouse name: None    Number of children: None    Years of education: None    Highest education level: None   Occupational History    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)   Social Needs    Financial resource strain: None    Food insecurity:     Worry: None     Inability: None    Transportation needs:     Medical: None     Non-medical: None   Tobacco Use    Smoking status: Never Smoker    Smokeless tobacco: Never Used   Substance and Sexual Activity    Alcohol use:  Yes     Alcohol/week: 5 0 standard drinks     Types: 5 Cans of beer per week     Drinks per session: 1 or 2     Binge frequency: Never    Drug use: No    Sexual activity: None   Lifestyle    Physical activity:     Days per week: None     Minutes per session: None    Stress: None   Relationships    Social connections:     Talks on phone: None     Gets together: None     Attends Alevism service: None     Active member of club or organization: None     Attends meetings of clubs or organizations: None     Relationship status: None    Intimate partner violence:     Fear of current or ex partner: None     Emotionally abused: None     Physically abused: None     Forced sexual activity: None   Other Topics Concern    None   Social History Narrative    Daily coffee consumption (1 cup/day)    Patient has living will    Uses safety equipment - seatbelts       Family History     Family History   Problem Relation Age of Onset    Lung cancer Mother     Stomach cancer Father     Other Maternal Grandmother         Stroke syndrome    Other Maternal Aunt         Cardiomegaly       Current Medications       Current Outpatient Medications:     ALPRAZolam (XANAX) 1 mg tablet, Take 1 tablet (1 mg total) by mouth 3 (three) times a day as needed for anxiety, Disp: 90 tablet, Rfl: 0    amLODIPine (NORVASC) 5 mg tablet, TAKE ONE TABLET BY MOUTH DAILY, Disp: 90 tablet, Rfl: 1    aspirin 81 mg chewable tablet, Chew 1 tablet daily, Disp: , Rfl:     citalopram (CeleXA) 40 mg tablet, Take 1 tablet (40 mg total) by mouth daily, Disp: 90 tablet, Rfl: 0    diphenoxylate-atropine (LOMOTIL) 2 5-0 025 mg per tablet, Take 1 tablet by mouth daily as needed, Disp: , Rfl:     Iron-Vitamins (GERITOL COMPLETE PO), Take 1 tablet by mouth daily, Disp: , Rfl:     levothyroxine 25 mcg tablet, Take 1 tablet (25 mcg total) by mouth daily, Disp: 90 tablet, Rfl: 0    omeprazole (PriLOSEC) 40 MG capsule, Take 1 capsule (40 mg total) by mouth daily, Disp: 90 capsule, Rfl: 0    doxycycline monohydrate (MONODOX) 100 mg capsule, Take 1 capsule (100 mg total) by mouth 2 (two) times a day for 10 days, Disp: 20 capsule, Rfl: 0     Allergies     Allergies   Allergen Reactions    Lumigan [Bimatoprost] Itching    Pneumococcal Polysaccharide Vaccine Fever     Other reaction(s): Chills, fever and mottling  Category: Allergy;        Objective     /64   Pulse 76   Temp 98 1 °F (36 7 °C)   Ht 5' 2" (1 575 m)   Wt 79 8 kg (176 lb)   SpO2 94%   BMI 32 19 kg/m²      Physical Exam   Constitutional: She is oriented to person, place, and time  She appears well-developed and well-nourished  HENT:   Head: Normocephalic  Right Ear: External ear normal    Left Ear: External ear normal    Nose: Nose normal    Mouth/Throat: Oropharynx is clear and moist    Eyes: Pupils are equal, round, and reactive to light  Conjunctivae are normal    Neck: Normal range of motion  Neck supple  Cardiovascular: Normal rate, regular rhythm, normal heart sounds and intact distal pulses  Pulmonary/Chest: Effort normal and breath sounds normal  Right breast exhibits no inverted nipple, no mass, no nipple discharge, no skin change and no tenderness  Left breast exhibits no inverted nipple, no mass, no nipple discharge, no skin change and no tenderness  Breasts are symmetrical    Abdominal: Soft  Bowel sounds are normal    Musculoskeletal: She exhibits tenderness  Tenderness anterior left shoulder    Neurological: She is alert and oriented to person, place, and time  Skin: Skin is warm and dry  There is erythema  1 0 cm of erythema left abdomen  Psychiatric: She has a normal mood and affect  Her behavior is normal  Judgment and thought content normal    Nursing note and vitals reviewed          No exam data present    Health Maintenance     Health Maintenance   Topic Date Due   Joaquin Bones Medicare Annual Wellness Visit (AWV) 1950    SLP PLAN OF CARE  1950    Pneumococcal Vaccine: 65+ Years (1 of 2 - PCV13) 08/24/2013    MAMMOGRAM  06/12/2020    BMI: Followup Plan  07/20/2020    DTaP,Tdap,and Td Vaccines (3 - Td) 10/12/2020    Fall Risk  11/01/2020    Depression Screening PHQ  11/01/2020    Urinary Incontinence Screening  11/01/2020    BMI: Adult  11/01/2020    CRC Screening: Colonoscopy  06/13/2022    Hepatitis C Screening  Completed    INFLUENZA VACCINE  Completed    Pneumococcal Vaccine: Pediatrics (0 to 5 Years) and At-Risk Patients (6 to 59 Years)  Aged Out    HEPATITIS B VACCINES  Aged [employer] History   Administered Date(s) Administered    H1N1, All Formulations 11/03/2009    Hep B, adult 01/07/1999, 02/09/1999, 08/10/1999    INFLUENZA 10/17/2018, 10/24/2019    Influenza Split High Dose Preservative Free IM 10/27/2015, 09/14/2016    Influenza TIV (IM) 1950, 10/15/2014    Pneumococcal Polysaccharide PPV23 01/01/2000    Tdap 01/01/2010, 10/12/2010    Zoster 02/02/2015       Assessment/Plan         1  Healthy female exam   2  Patient Counseling:   · Nutrition: Stressed importance of a well balanced diet, moderation of sodium/saturated fat, caloric balance and sufficient intake of fiber  · Exercise: Stressed the importance of regular exercise with a goal of 150 minutes per week  · Dental Health: Discussed daily flossing and brushing and regular dental visits     · Immunizations reviewed  · Discussed benefits of screening   · Discussed the patient's BMI with her  The BMI is above average; BMI management plan is completed  3  Cancer Screening   4  Labs   5  Follow up in one year      Joseph Bloom DO
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
## 2019-11-02 PROBLEM — Z00.00 MEDICARE ANNUAL WELLNESS VISIT, SUBSEQUENT: Status: ACTIVE | Noted: 2019-11-02

## 2019-11-02 PROBLEM — Z00.00 WELL ADULT EXAM: Status: ACTIVE | Noted: 2019-11-02

## 2019-11-02 PROBLEM — K62.5 HEMORRHAGE OF RECTUM AND ANUS: Status: ACTIVE | Noted: 2019-11-02

## 2019-11-02 PROBLEM — S30.861A TICK BITE OF ABDOMEN: Status: ACTIVE | Noted: 2019-11-02

## 2019-11-02 PROBLEM — K76.0 FATTY LIVER: Status: ACTIVE | Noted: 2019-11-02

## 2019-11-02 PROBLEM — R13.12 OROPHARYNGEAL DYSPHAGIA: Status: ACTIVE | Noted: 2019-11-02

## 2019-11-02 PROBLEM — K21.9 GASTROESOPHAGEAL REFLUX DISEASE: Status: ACTIVE | Noted: 2019-11-02

## 2019-11-02 PROBLEM — R10.11 RIGHT UPPER QUADRANT PAIN: Status: ACTIVE | Noted: 2019-11-02

## 2019-11-02 PROBLEM — R14.0 ABDOMINAL BLOATING: Status: ACTIVE | Noted: 2019-11-02

## 2019-11-30 DIAGNOSIS — I10 ESSENTIAL HYPERTENSION: ICD-10-CM

## 2019-12-02 RX ORDER — AMLODIPINE BESYLATE 5 MG/1
TABLET ORAL
Qty: 90 TABLET | Refills: 2 | Status: SHIPPED | OUTPATIENT
Start: 2019-12-02 | End: 2021-02-08 | Stop reason: SDUPTHER

## 2019-12-27 DIAGNOSIS — J45.20 MILD INTERMITTENT ASTHMA WITHOUT COMPLICATION: Primary | ICD-10-CM

## 2019-12-27 RX ORDER — ALBUTEROL SULFATE 90 UG/1
2 AEROSOL, METERED RESPIRATORY (INHALATION) EVERY 6 HOURS PRN
Qty: 18 G | Refills: 2 | Status: SHIPPED | OUTPATIENT
Start: 2019-12-27 | End: 2020-10-28 | Stop reason: SDUPTHER

## 2019-12-31 DIAGNOSIS — F41.9 ANXIETY: ICD-10-CM

## 2019-12-31 RX ORDER — CITALOPRAM 40 MG/1
TABLET ORAL
Qty: 90 TABLET | Refills: 0 | Status: SHIPPED | OUTPATIENT
Start: 2019-12-31 | End: 2020-03-09

## 2020-01-16 ENCOUNTER — TELEPHONE (OUTPATIENT)
Dept: FAMILY MEDICINE CLINIC | Facility: CLINIC | Age: 70
End: 2020-01-16

## 2020-01-16 DIAGNOSIS — R68.89 FLU-LIKE SYMPTOMS: Primary | ICD-10-CM

## 2020-01-16 RX ORDER — OSELTAMIVIR PHOSPHATE 75 MG/1
75 CAPSULE ORAL EVERY 12 HOURS SCHEDULED
Qty: 10 CAPSULE | Refills: 0 | Status: SHIPPED | OUTPATIENT
Start: 2020-01-16 | End: 2020-01-21

## 2020-01-16 NOTE — TELEPHONE ENCOUNTER
Patient needing rx for tamiflu to be sent to [facility] in [facility]   was just dx with flu and patient is now becoming symptomatic

## 2020-01-17 ENCOUNTER — OFFICE VISIT (OUTPATIENT)
Dept: FAMILY MEDICINE CLINIC | Facility: CLINIC | Age: 70
End: 2020-01-17
Payer: COMMERCIAL

## 2020-01-17 VITALS
BODY MASS INDEX: 32.57 KG/M2 | WEIGHT: 177 LBS | HEIGHT: 62 IN | DIASTOLIC BLOOD PRESSURE: 80 MMHG | SYSTOLIC BLOOD PRESSURE: 142 MMHG | TEMPERATURE: 98 F | OXYGEN SATURATION: 98 % | HEART RATE: 67 BPM

## 2020-01-17 DIAGNOSIS — J45.20 MILD INTERMITTENT REACTIVE AIRWAY DISEASE WITHOUT COMPLICATION: ICD-10-CM

## 2020-01-17 DIAGNOSIS — J40 TRACHEOBRONCHITIS: Primary | ICD-10-CM

## 2020-01-17 PROCEDURE — 1036F TOBACCO NON-USER: CPT | Performed by: FAMILY MEDICINE

## 2020-01-17 PROCEDURE — 1160F RVW MEDS BY RX/DR IN RCRD: CPT | Performed by: FAMILY MEDICINE

## 2020-01-17 PROCEDURE — 3008F BODY MASS INDEX DOCD: CPT | Performed by: FAMILY MEDICINE

## 2020-01-17 PROCEDURE — 99213 OFFICE O/P EST LOW 20 MIN: CPT | Performed by: FAMILY MEDICINE

## 2020-01-17 RX ORDER — PREDNISONE 10 MG/1
TABLET ORAL
Qty: 20 TABLET | Refills: 0 | Status: SHIPPED | OUTPATIENT
Start: 2020-01-17 | End: 2020-06-10 | Stop reason: ALTCHOICE

## 2020-01-17 RX ORDER — ALBUTEROL SULFATE 2.5 MG/3ML
2.5 SOLUTION RESPIRATORY (INHALATION) EVERY 6 HOURS PRN
Qty: 45 VIAL | Refills: 0 | Status: SHIPPED | OUTPATIENT
Start: 2020-01-17 | End: 2020-05-20 | Stop reason: SDUPTHER

## 2020-01-17 NOTE — PATIENT INSTRUCTIONS
Prednisone 10mg 4 tabs for 2 days, 3 tabs for 2 days, 2 tabs for 2 days,1 tab for 2 days    Albuterol nebulizer up to every 4 hours as needed

## 2020-01-17 NOTE — PROGRESS NOTES
Assessment/Plan:      Diagnoses and all orders for this visit:    Tracheobronchitis  Comments:  pred taper, use inhaler as needed  Mild intermittent reactive airway disease without complication  Comments:  albuterol as needed  Orders:  -     albuterol (2 5 mg/3 mL) 0 083 % nebulizer solution; Take 1 vial (2 5 mg total) by nebulization every 6 (six) hours as needed for wheezing or shortness of breath  -     predniSONE 10 mg tablet; 4 tabs for 2 days, 3 tabs for 2 days, 2 tabs for 2 days and 1 tab for 2 days          Subjective:  Chief Complaint   Patient presents with    Cough     pt c/o coughing and having breathing problems that started a mouth ago, pt c/o fatigue, achey, chills, no fever the week before christmas that lasted 2 weeks, pt states when she cough she has a tickle in her throat, cough is lose in her chest, pt has a heavy feeling in her left lung and her chest is heavy, pt choked while eating her breakfast pt calmed her self down to breath out her mouth, pt used inhaler that helped,         Patient ID: Oswald Lazcano is a 68 y o  female  Pt here with  today  Complains of Cough ongoing since christmas  - dry cough  Choked on food this am  Used inhaler this am  Not sure it helped  Night time ok- not much coughing when laying down, able to sleep  Using Honey  and cough drops  No fever, no headaches, no chills   had flu and is on tamiflu       Review of Systems   Constitutional: Negative  Negative for fatigue and fever  HENT: Negative  Eyes: Negative  Respiratory: Positive for cough and shortness of breath  Cardiovascular: Negative  Gastrointestinal: Negative  Endocrine: Negative  Genitourinary: Negative  Musculoskeletal: Negative  Skin: Negative  Allergic/Immunologic: Negative  Neurological: Negative  Psychiatric/Behavioral: Negative            The following portions of the patient's history were reviewed and updated as appropriate: allergies, current medications, past family history, past medical history, past social history, past surgical history and problem list     Objective:  Vitals:    01/17/20 1450 01/17/20 1544   BP: 160/74 142/80   Pulse: 67    Temp: 98 °F (36 7 °C)    SpO2: 98%    Weight: 80 3 kg (177 lb)    Height: 5' 2" (1 575 m)       Physical Exam   Constitutional: She is oriented to person, place, and time  She appears well-developed and well-nourished  HENT:   Head: Normocephalic and atraumatic  Cardiovascular: Normal rate, regular rhythm, normal heart sounds and intact distal pulses  Pulmonary/Chest: Effort normal and breath sounds normal  She has no wheezes  She has no rales  Abdominal: Soft  Bowel sounds are normal    Neurological: She is alert and oriented to person, place, and time  Skin: Skin is warm and dry  Psychiatric: She has a normal mood and affect  Her behavior is normal  Judgment and thought content normal    Nursing note and vitals reviewed

## 2020-01-18 PROBLEM — J40 TRACHEOBRONCHITIS: Status: ACTIVE | Noted: 2020-01-18

## 2020-01-20 ENCOUNTER — TELEPHONE (OUTPATIENT)
Dept: FAMILY MEDICINE CLINIC | Facility: CLINIC | Age: 70
End: 2020-01-20

## 2020-01-20 NOTE — TELEPHONE ENCOUNTER
She can hold on the nebulizer treatments  Both medications can make her shakey  Just continue with the prednisone and see how she does with that

## 2020-01-20 NOTE — TELEPHONE ENCOUNTER
Patient states that she does not feel like the nebulizer is helping her that much as it did the last time she used it  She is using the prednisone and sx are improving  She is feeling a little jumpy but that is ok  Does she need to continue with the nebulizer, not sure if she needs all the steroids      Please advise at [phone]

## 2020-02-28 ENCOUNTER — OFFICE VISIT (OUTPATIENT)
Dept: FAMILY MEDICINE CLINIC | Facility: CLINIC | Age: 70
End: 2020-02-28
Payer: COMMERCIAL

## 2020-02-28 VITALS
WEIGHT: 181.5 LBS | TEMPERATURE: 98.7 F | SYSTOLIC BLOOD PRESSURE: 128 MMHG | HEIGHT: 62 IN | BODY MASS INDEX: 33.4 KG/M2 | OXYGEN SATURATION: 95 % | DIASTOLIC BLOOD PRESSURE: 82 MMHG | HEART RATE: 75 BPM

## 2020-02-28 DIAGNOSIS — G89.29 CHRONIC BILATERAL LOW BACK PAIN WITHOUT SCIATICA: ICD-10-CM

## 2020-02-28 DIAGNOSIS — M25.561 ACUTE PAIN OF RIGHT KNEE: Primary | ICD-10-CM

## 2020-02-28 DIAGNOSIS — M54.50 CHRONIC BILATERAL LOW BACK PAIN WITHOUT SCIATICA: ICD-10-CM

## 2020-02-28 DIAGNOSIS — M25.551 RIGHT HIP PAIN: ICD-10-CM

## 2020-02-28 PROCEDURE — 4040F PNEUMOC VAC/ADMIN/RCVD: CPT | Performed by: FAMILY MEDICINE

## 2020-02-28 PROCEDURE — 3079F DIAST BP 80-89 MM HG: CPT | Performed by: FAMILY MEDICINE

## 2020-02-28 PROCEDURE — 1160F RVW MEDS BY RX/DR IN RCRD: CPT | Performed by: FAMILY MEDICINE

## 2020-02-28 PROCEDURE — 3008F BODY MASS INDEX DOCD: CPT | Performed by: FAMILY MEDICINE

## 2020-02-28 PROCEDURE — 3074F SYST BP LT 130 MM HG: CPT | Performed by: FAMILY MEDICINE

## 2020-02-28 PROCEDURE — 99214 OFFICE O/P EST MOD 30 MIN: CPT | Performed by: FAMILY MEDICINE

## 2020-02-28 PROCEDURE — 1036F TOBACCO NON-USER: CPT | Performed by: FAMILY MEDICINE

## 2020-02-28 NOTE — PATIENT INSTRUCTIONS

## 2020-02-28 NOTE — PROGRESS NOTES
Subjective:   Chief Complaint   Patient presents with    Knee Pain     PT COMES IN TODAY WITH RIGHT HIP PAIN  RADIATING INTO RIGHT KNEE X 1 WEEK  NO FALLS  NO INJURY  NO TRAUMA          Patient ID: Diana Falk is a 68 y o  female  The patient is here today because she has been having some pain in her right knee  A couple of days ago she had slept on her right side and woke up and was having a stabbing pain in her right hip  This has actually since resolved  She does have some chronic issues with her low back  She did have some pain in the low back radiating around to the hip that day as well  As she did get up and move around more this seem to get better and again, it is completely resolved  She has developed pain in the right knee, though, since then  She does feel it is swollen a little bit  She feels like the knee cap is moving back and forth especially when she walks up stairs  Walking down stairs is not as bad  She has had times where she feels like she is going to fall  No fevers or chills  No other symptoms  She has never had issues with her knee before  She denies any trauma at all  She denies falls  She does say that it hurt a lot more at the end of the day yesterday after she had done a lot of walking      The following portions of the patient's history were reviewed and updated as appropriate: allergies, current medications, past family history, past medical history, past social history, past surgical history and problem list     Review of Systems          Objective:  Vitals:    02/28/20 1205   BP: 128/82   Pulse: 75   Temp: 98 7 °F (37 1 °C)   SpO2: 95%   Weight: 82 3 kg (181 lb 8 oz)   Height: 5' 2" (1 575 m)      Physical Exam   Constitutional: She is oriented to person, place, and time  She appears well-developed and well-nourished  No distress  Musculoskeletal:        Right hip: She exhibits normal range of motion, normal strength, no tenderness, no bony tenderness and no swelling  Right knee: She exhibits decreased range of motion, swelling (very mild, laterally) and MCL laxity  She exhibits no effusion, no ecchymosis, no deformity, no erythema and normal alignment  Tenderness found  Medial joint line, MCL and patellar tendon tenderness noted  Lumbar back: She exhibits normal range of motion, no tenderness and no bony tenderness  Neurological: She is alert and oriented to person, place, and time  No cranial nerve deficit  Coordination normal    Skin: Skin is warm and dry  No rash noted  She is not diaphoretic  No erythema  Psychiatric: She has a normal mood and affect  Her behavior is normal  Judgment and thought content normal          Assessment/Plan:    No problem-specific Assessment & Plan notes found for this encounter  Diagnoses and all orders for this visit:    Acute pain of right knee  -     Ambulatory referral to Orthopedic Surgery; Future    Right hip pain    Chronic bilateral low back pain without sciatica        At this point her knee is really what is bothering her the most   She does have some chronic low back pain which may very well be related to her obesity  Her hip pain has completely resolved  She has absolutely no pain over the hip  She has no radicular symptoms and no pain over the SI joint  I am going to refer her to orthopedic surgery  I discussed with her that she will get an x-ray there but I am not entirely certain this is bony  If it is not they may consider an injection, physical therapy, or further imaging with an MRI  Alternatively, if nothing is found we may want to consider something like Lyme disease especially the joint swells more or she develops any more systemic symptoms

## 2020-03-02 ENCOUNTER — TELEPHONE (OUTPATIENT)
Dept: FAMILY MEDICINE CLINIC | Facility: CLINIC | Age: 70
End: 2020-03-02

## 2020-03-02 DIAGNOSIS — T23.199A: Primary | ICD-10-CM
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
## 2020-03-04 DIAGNOSIS — E03.9 ACQUIRED HYPOTHYROIDISM: ICD-10-CM

## 2020-03-04 RX ORDER — LEVOTHYROXINE SODIUM 0.03 MG/1
25 TABLET ORAL DAILY
Qty: 90 TABLET | Refills: 2 | Status: SHIPPED | OUTPATIENT
Start: 2020-03-04 | End: 2020-07-01

## 2020-03-06 ENCOUNTER — TELEPHONE (OUTPATIENT)
Dept: OBGYN CLINIC | Facility: HOSPITAL | Age: 70
End: 2020-03-06

## 2020-03-06 ENCOUNTER — OFFICE VISIT (OUTPATIENT)
Dept: OBGYN CLINIC | Facility: CLINIC | Age: 70
End: 2020-03-06
Payer: COMMERCIAL

## 2020-03-06 ENCOUNTER — APPOINTMENT (OUTPATIENT)
Dept: RADIOLOGY | Facility: CLINIC | Age: 70
End: 2020-03-06
Payer: COMMERCIAL

## 2020-03-06 VITALS
HEART RATE: 73 BPM | SYSTOLIC BLOOD PRESSURE: 124 MMHG | WEIGHT: 181 LBS | DIASTOLIC BLOOD PRESSURE: 82 MMHG | BODY MASS INDEX: 33.31 KG/M2 | HEIGHT: 62 IN

## 2020-03-06 DIAGNOSIS — M25.551 RIGHT HIP PAIN: ICD-10-CM

## 2020-03-06 DIAGNOSIS — M25.561 RIGHT KNEE PAIN, UNSPECIFIED CHRONICITY: ICD-10-CM

## 2020-03-06 DIAGNOSIS — M25.561 ACUTE PAIN OF RIGHT KNEE: ICD-10-CM

## 2020-03-06 DIAGNOSIS — S83.241A OTHER TEAR OF MEDIAL MENISCUS, CURRENT INJURY, RIGHT KNEE, INITIAL ENCOUNTER: ICD-10-CM

## 2020-03-06 DIAGNOSIS — M16.11 PRIMARY OSTEOARTHRITIS OF ONE HIP, RIGHT: ICD-10-CM

## 2020-03-06 DIAGNOSIS — M16.11 PRIMARY OSTEOARTHRITIS OF ONE HIP, RIGHT: Primary | ICD-10-CM

## 2020-03-06 PROCEDURE — 3074F SYST BP LT 130 MM HG: CPT | Performed by: ORTHOPAEDIC SURGERY

## 2020-03-06 PROCEDURE — 4040F PNEUMOC VAC/ADMIN/RCVD: CPT | Performed by: ORTHOPAEDIC SURGERY

## 2020-03-06 PROCEDURE — 3008F BODY MASS INDEX DOCD: CPT | Performed by: ORTHOPAEDIC SURGERY

## 2020-03-06 PROCEDURE — 73562 X-RAY EXAM OF KNEE 3: CPT

## 2020-03-06 PROCEDURE — 73564 X-RAY EXAM KNEE 4 OR MORE: CPT

## 2020-03-06 PROCEDURE — 99214 OFFICE O/P EST MOD 30 MIN: CPT | Performed by: ORTHOPAEDIC SURGERY

## 2020-03-06 PROCEDURE — 73502 X-RAY EXAM HIP UNI 2-3 VIEWS: CPT

## 2020-03-06 PROCEDURE — 1160F RVW MEDS BY RX/DR IN RCRD: CPT | Performed by: ORTHOPAEDIC SURGERY

## 2020-03-06 PROCEDURE — 1036F TOBACCO NON-USER: CPT | Performed by: ORTHOPAEDIC SURGERY

## 2020-03-06 PROCEDURE — 3079F DIAST BP 80-89 MM HG: CPT | Performed by: ORTHOPAEDIC SURGERY

## 2020-03-06 RX ORDER — NAPROXEN 500 MG/1
500 TABLET ORAL 2 TIMES DAILY WITH MEALS
Qty: 15 TABLET | Refills: 0 | Status: SHIPPED | OUTPATIENT
Start: 2020-03-06 | End: 2020-03-06

## 2020-03-06 RX ORDER — NAPROXEN 500 MG/1
500 TABLET ORAL 2 TIMES DAILY WITH MEALS
Qty: 28 TABLET | Refills: 0 | Status: SHIPPED | OUTPATIENT
Start: 2020-03-06 | End: 2020-10-08 | Stop reason: ALTCHOICE

## 2020-03-06 NOTE — PROGRESS NOTES
Ortho Sports Medicine Knee New Patient Visit     Assesment:   68 y o  female right hip moderate osteoarthritis with possible right knee medial meniscus tear    Plan:    Conservative treatment:    Ice to knee for 20 minutes at least 1-2 times daily  PT for ROM/strengthening to knee, hip and core  Prescription NSAIDS for pain (Naproxen 500 mg)  Imaging: All imaging from today was reviewed by myself and explained to the patient  Injection:    No Injection planned at this time  May consider future corticosteroid injection depending on clinical exam/imaging  Surgery:     No surgery is recommended at this point, continue with conservative treatment plan as noted  If conservative measures fail we may consider referring her to Dr Pedro Randhawa  Follow up:    Return in about 8 weeks (around 5/1/2020)  Chief Complaint   Patient presents with    Right Knee - Pain       History of Present Illness: The patient is a 68 y o  female whose occupation is retired, referred to me by their primary care physician, seen in clinic for consultation of right knee and hip pain  Pain is located posterior, lateral, medial  The patient rates the pain as a 6/10  The pain has been present for 2 weeks  The patient does not recall and injury  The mechanism of injury was uknown  She noticed this pain 2 weeks ago and it started as hip pain on the right side  The pain is characterized as sharp, stabbing  The pain is present at all times  The patient states that she has been having right hip pain that shoots down the out side of her leg to the inside of her knee  Pain is improved by rest, ice and NSAIDS  Pain is aggravated by stairs, squatting, weight bearing and pivoting on a planted foot  Symptoms include clicking, cracking, swelling and locking  The patient has tried rest, ice and NSAIDS            Knee Surgical History:  Left knee menisectomy     Past Medical, Social and Family History:  Past Medical History: Diagnosis Date    Asthma     Disease of thyroid gland     GERD (gastroesophageal reflux disease)     Migraine     Raynaud disease     S/P trigger finger release     Sleep apnea     Visual disturbance      Past Surgical History:   Procedure Laterality Date    CATARACT EXTRACTION      COLON SURGERY      GLAUCOMA SURGERY      HALLUX VALGUS CORRECTION Right 06/19/2007    INCISION TENDON SHEATH HAND  05/23/2011    Release of A1 pulley    KNEE ARTHROSCOPY W/ MENISCAL REPAIR Left 2003    [place] ESOPHAGOGASTRODUODENOSCOPY TRANSORAL DIAGNOSTIC N/A 8/22/2018    Procedure: ESOPHAGOGASTRODUODENOSCOPY (EGD); Surgeon: Celia Miller MD;  [facility];  Service: Gastroenterology    [place] INCISE FINGER TENDON SHEATH Left 7/11/2019    Procedure: RELEASE TRIGGER FINGER left ring;  Surgeon: Nuno Monsalve MD;  [facility];  Service: Orthopedics    TRIGGER FINGER RELEASE      UPPER GASTROINTESTINAL ENDOSCOPY       Allergies   Allergen Reactions    Lumigan [Bimatoprost] Itching    Pneumococcal Polysaccharide Vaccine Fever     Other reaction(s): Chills, fever and mottling  Category:  Allergy;      Current Outpatient Medications on File Prior to Visit   Medication Sig Dispense Refill    albuterol (2 5 mg/3 mL) 0 083 % nebulizer solution Take 1 vial (2 5 mg total) by nebulization every 6 (six) hours as needed for wheezing or shortness of breath 45 vial 0    albuterol (PROAIR HFA) 90 mcg/act inhaler Inhale 2 puffs every 6 (six) hours as needed for wheezing 18 g 2    ALPRAZolam (XANAX) 1 mg tablet Take 1 tablet (1 mg total) by mouth 3 (three) times a day as needed for anxiety 90 tablet 0    amLODIPine (NORVASC) 5 mg tablet TAKE ONE TABLET BY MOUTH DAILY 90 tablet 2    aspirin 81 mg chewable tablet Chew 1 tablet daily      citalopram (CeleXA) 40 mg tablet TAKE ONE TABLET BY MOUTH EVERY DAY 90 tablet 0    diphenoxylate-atropine (LOMOTIL) 2 5-0 025 mg per tablet Take 1 tablet by mouth daily as needed  Iron-Vitamins (GERITOL COMPLETE PO) Take 1 tablet by mouth daily      levothyroxine 25 mcg tablet Take 1 tablet (25 mcg total) by mouth daily 90 tablet 2    omeprazole (PriLOSEC) 40 MG capsule Take 1 capsule (40 mg total) by mouth daily 90 capsule 0    silver sulfadiazine (SILVADENE,SSD) 1 % cream Apply topically daily 50 g 0    predniSONE 10 mg tablet 4 tabs for 2 days, 3 tabs for 2 days, 2 tabs for 2 days and 1 tab for 2 days (Patient not taking: Reported on 3/6/2020) 20 tablet 0     No current facility-administered medications on file prior to visit  Social History     Socioeconomic History    Marital status: /Civil Union     Spouse name: Not on file    Number of children: Not on file    Years of education: Not on file    Highest education level: Not on file   Occupational History    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)   Social Needs    Financial resource strain: Not on file    Food insecurity:     Worry: Not on file     Inability: Not on file    Transportation needs:     Medical: Not on file     Non-medical: Not on file   Tobacco Use    Smoking status: Never Smoker    Smokeless tobacco: Never Used   Substance and Sexual Activity    Alcohol use:  Yes     Alcohol/week: 5 0 standard drinks     Types: 5 Cans of beer per week     Drinks per session: 1 or 2     Binge frequency: Never    Drug use: No    Sexual activity: Not on file   Lifestyle    Physical activity:     Days per week: Not on file     Minutes per session: Not on file    Stress: Not on file   Relationships    Social connections:     Talks on phone: Not on file     Gets together: Not on file     Attends [employer] service: Not on file     Active member of club or organization: Not on file     Attends meetings of clubs or organizations: Not on file     Relationship status: Not on file    Intimate partner violence:     Fear of current or ex partner: Not on file Emotionally abused: Not on file     Physically abused: Not on file     Forced sexual activity: Not on file   Other Topics Concern    Not on file   Social History Narrative    Daily coffee consumption (1 cup/day)    Patient has living will    Uses safety equipment - seatbelts     I have reviewed the past medical, surgical, social and family history, medications and allergies as documented in the EMR  Review of systems: ROS is negative other than that noted in the HPI  Constitutional: Negative for fatigue and fever  HENT: Negative for sore throat  Respiratory: Negative for shortness of breath  Cardiovascular: Negative for chest pain  Gastrointestinal: Negative for abdominal pain  Endocrine: Negative for cold intolerance and heat intolerance  Genitourinary: Negative for flank pain  Musculoskeletal: Negative for back pain  Skin: Negative for rash  Allergic/Immunologic: Negative for immunocompromised state  Neurological: Negative for dizziness  Psychiatric/Behavioral: Negative for agitation  Physical Exam:    Blood pressure 124/82, pulse 73, height 5' 2" (1 575 m), weight 82 1 kg (181 lb)  General/Constitutional: NAD, well developed, well nourished  HENT: Normocephalic, atraumatic  CV: Intact distal pulses, regular rate  Resp: No respiratory distress or labored breathing  Lymphatic: No lymphadenopathy palpated  Neuro: Alert and Oriented x 3, no focal deficits  Psych: Normal mood, normal affect, normal judgement, normal behavior  Skin: Warm, dry, no rashes, no erythema      Knee Exam (focused): RIGHT LEFT   ROM:   0-130 0-130   Palpation: Effusion negative negative     MJL tenderness Positive Negative     LJL tenderness Negative Negative   Meniscus:  Gilbert Positive Negative    Apley's Compression Positive Negative   Instability: Varus stable stable     Valgus stable stable   Special Tests: Lachman Negative Negative     Posterior drawer Negative Negative     Anterior drawer Negative Negative     Pivot shift not tested not tested     Dial not tested not tested   Patella: Palpation no tenderness no tenderness     Mobility 1/4 1/4     Apprehension Negative Negative   Other: Single leg 1/4 squat not tested not tested         Hip Exam (focused):    right hip:     No dislocation/deformity  ROM: Full  Greater troch:tender   SI joint: non-tender  Jasbir Darner test: negative  Danyell's test:  negative  Abduction: 5/5  AT/GS intact    Back:    No TTP over lumbar spinous processes, paraspinal musculature  Negative SLR    LE NV Exam: +2 DP/PT pulses bilaterally  Sensation intact to light touch L2-S1 bilaterally     Bilateral hip ROM demonstrates no pain actively or passively    No calf tenderness to palpation bilaterally    Knee Imaging    X-rays of the right knee were reviewed, which demonstrate mild osteoarthritis of medial joint  I have reviewed the radiology report and do not currently have a radiology reading from [facility], but will check the result once the reading is performed  X-rays of the bilateral hip were reviewed, which demonstrate moderate osteoarthritis  I have reviewed the radiology report and do not currently have a radiology reading from [facility], but will check the result once the reading is performed

## 2020-03-06 NOTE — TELEPHONE ENCOUNTER
[facility] called- Patient of Dr Scooter Lopez  Re: refill medication  # [phone]    [facility] states the naproxen 500 mg EC is currently on [address] [facility] [address] wants to know if Dr Scooter Lopez can send in a new RX for regular naproxen       [facility], [address]   [phone]

## 2020-03-07 DIAGNOSIS — F41.9 ANXIETY: ICD-10-CM

## 2020-03-09 RX ORDER — CITALOPRAM 40 MG/1
TABLET ORAL
Qty: 90 TABLET | Refills: 0 | Status: SHIPPED | OUTPATIENT
Start: 2020-03-09 | End: 2020-03-31 | Stop reason: SDUPTHER

## 2020-03-26 DIAGNOSIS — K44.9 HIATAL HERNIA: ICD-10-CM

## 2020-03-26 DIAGNOSIS — F41.9 ANXIETY: ICD-10-CM

## 2020-03-26 RX ORDER — OMEPRAZOLE 40 MG/1
CAPSULE, DELAYED RELEASE ORAL
Qty: 90 CAPSULE | Refills: 0 | Status: SHIPPED | OUTPATIENT
Start: 2020-03-26 | End: 2020-08-19 | Stop reason: SDUPTHER

## 2020-03-26 RX ORDER — CITALOPRAM 40 MG/1
TABLET ORAL
Qty: 90 TABLET | Refills: 0 | OUTPATIENT
Start: 2020-03-26

## 2020-03-29 DIAGNOSIS — F41.9 ANXIETY: ICD-10-CM

## 2020-03-30 RX ORDER — CITALOPRAM 40 MG/1
TABLET ORAL
Qty: 90 TABLET | Refills: 0 | OUTPATIENT
Start: 2020-03-30

## 2020-03-31 DIAGNOSIS — F41.9 ANXIETY: ICD-10-CM

## 2020-03-31 RX ORDER — CITALOPRAM 40 MG/1
40 TABLET ORAL DAILY
Qty: 90 TABLET | Refills: 0 | Status: SHIPPED | OUTPATIENT
Start: 2020-03-31 | End: 2020-07-01

## 2020-03-31 NOTE — TELEPHONE ENCOUNTER
The pharmacy said they would over ride the medication  And fill for her since the virus is here  Can you call it in for her      90 day supply

## 2020-05-14 ENCOUNTER — TRANSCRIBE ORDERS (OUTPATIENT)
Dept: ADMINISTRATIVE | Facility: HOSPITAL | Age: 70
End: 2020-05-14

## 2020-05-14 DIAGNOSIS — Z12.31 ENCOUNTER FOR SCREENING MAMMOGRAM FOR MALIGNANT NEOPLASM OF BREAST: Primary | ICD-10-CM

## 2020-05-20 DIAGNOSIS — J45.20 MILD INTERMITTENT REACTIVE AIRWAY DISEASE WITHOUT COMPLICATION: ICD-10-CM

## 2020-05-20 RX ORDER — ALBUTEROL SULFATE 2.5 MG/3ML
2.5 SOLUTION RESPIRATORY (INHALATION) EVERY 6 HOURS PRN
Qty: 45 VIAL | Refills: 0 | Status: SHIPPED | OUTPATIENT
Start: 2020-05-20 | End: 2020-10-28 | Stop reason: SDUPTHER

## 2020-06-10 ENCOUNTER — OFFICE VISIT (OUTPATIENT)
Dept: FAMILY MEDICINE CLINIC | Facility: CLINIC | Age: 70
End: 2020-06-10
Payer: COMMERCIAL

## 2020-06-10 VITALS
DIASTOLIC BLOOD PRESSURE: 80 MMHG | HEART RATE: 73 BPM | OXYGEN SATURATION: 95 % | SYSTOLIC BLOOD PRESSURE: 145 MMHG | WEIGHT: 172 LBS | HEIGHT: 62 IN | BODY MASS INDEX: 31.65 KG/M2 | TEMPERATURE: 99.6 F

## 2020-06-10 DIAGNOSIS — F41.9 ANXIETY: ICD-10-CM

## 2020-06-10 DIAGNOSIS — E66.9 OBESITY (BMI 30.0-34.9): ICD-10-CM

## 2020-06-10 DIAGNOSIS — I10 ESSENTIAL HYPERTENSION: ICD-10-CM

## 2020-06-10 DIAGNOSIS — R07.89 ATYPICAL CHEST PAIN: Primary | ICD-10-CM

## 2020-06-10 DIAGNOSIS — M62.838 MUSCLE SPASM: ICD-10-CM

## 2020-06-10 DIAGNOSIS — K44.9 HIATAL HERNIA: ICD-10-CM

## 2020-06-10 PROCEDURE — 3077F SYST BP >= 140 MM HG: CPT | Performed by: FAMILY MEDICINE

## 2020-06-10 PROCEDURE — 3079F DIAST BP 80-89 MM HG: CPT | Performed by: FAMILY MEDICINE

## 2020-06-10 PROCEDURE — 4040F PNEUMOC VAC/ADMIN/RCVD: CPT | Performed by: FAMILY MEDICINE

## 2020-06-10 PROCEDURE — 3008F BODY MASS INDEX DOCD: CPT | Performed by: FAMILY MEDICINE

## 2020-06-10 PROCEDURE — 99214 OFFICE O/P EST MOD 30 MIN: CPT | Performed by: FAMILY MEDICINE

## 2020-06-10 PROCEDURE — 1160F RVW MEDS BY RX/DR IN RCRD: CPT | Performed by: FAMILY MEDICINE

## 2020-06-10 PROCEDURE — 1036F TOBACCO NON-USER: CPT | Performed by: FAMILY MEDICINE

## 2020-06-10 RX ORDER — CYCLOBENZAPRINE HCL 10 MG
10 TABLET ORAL
Qty: 20 TABLET | Refills: 0 | Status: SHIPPED | OUTPATIENT
Start: 2020-06-10 | End: 2020-08-19 | Stop reason: ALTCHOICE

## 2020-06-10 RX ORDER — BUSPIRONE HYDROCHLORIDE 10 MG/1
10 TABLET ORAL 3 TIMES DAILY
Qty: 60 TABLET | Refills: 1 | Status: SHIPPED | OUTPATIENT
Start: 2020-06-10 | End: 2021-02-01 | Stop reason: SDUPTHER

## 2020-06-11 PROBLEM — K62.5 HEMORRHAGE OF RECTUM AND ANUS: Status: RESOLVED | Noted: 2019-11-02 | Resolved: 2020-06-11

## 2020-06-11 PROBLEM — J40 TRACHEOBRONCHITIS: Status: RESOLVED | Noted: 2020-01-18 | Resolved: 2020-06-11

## 2020-06-11 PROBLEM — R07.89 ATYPICAL CHEST PAIN: Status: ACTIVE | Noted: 2020-06-11

## 2020-06-11 PROBLEM — M62.838 MUSCLE SPASM: Status: ACTIVE | Noted: 2020-06-11

## 2020-06-12 ENCOUNTER — HOSPITAL ENCOUNTER (OUTPATIENT)
Dept: BONE DENSITY | Facility: CLINIC | Age: 70
Discharge: HOME | End: 2020-06-12
Payer: COMMERCIAL

## 2020-06-12 VITALS — WEIGHT: 172 LBS | BODY MASS INDEX: 31.65 KG/M2 | HEIGHT: 62 IN

## 2020-06-12 DIAGNOSIS — Z12.31 ENCOUNTER FOR SCREENING MAMMOGRAM FOR MALIGNANT NEOPLASM OF BREAST: ICD-10-CM

## 2020-06-12 PROCEDURE — 77067 SCR MAMMO BI INCL CAD: CPT

## 2020-06-12 PROCEDURE — 77063 BREAST TOMOSYNTHESIS BI: CPT

## 2020-06-16 ENCOUNTER — OFFICE VISIT (OUTPATIENT)
Dept: FAMILY MEDICINE CLINIC | Facility: CLINIC | Age: 70
End: 2020-06-16
Payer: COMMERCIAL

## 2020-06-16 VITALS — BODY MASS INDEX: 31.46 KG/M2 | HEIGHT: 62 IN

## 2020-06-16 DIAGNOSIS — L82.1 SEBORRHEIC KERATOSIS: Primary | ICD-10-CM

## 2020-06-16 PROCEDURE — 1036F TOBACCO NON-USER: CPT | Performed by: FAMILY MEDICINE

## 2020-06-16 PROCEDURE — 3077F SYST BP >= 140 MM HG: CPT | Performed by: FAMILY MEDICINE

## 2020-06-16 PROCEDURE — 3079F DIAST BP 80-89 MM HG: CPT | Performed by: FAMILY MEDICINE

## 2020-06-16 PROCEDURE — 99213 OFFICE O/P EST LOW 20 MIN: CPT | Performed by: FAMILY MEDICINE

## 2020-06-16 PROCEDURE — 1160F RVW MEDS BY RX/DR IN RCRD: CPT | Performed by: FAMILY MEDICINE

## 2020-06-16 PROCEDURE — 4040F PNEUMOC VAC/ADMIN/RCVD: CPT | Performed by: FAMILY MEDICINE

## 2020-06-16 RX ORDER — MULTIVITAMIN
1 TABLET ORAL DAILY
COMMUNITY

## 2020-06-30 DIAGNOSIS — F41.9 ANXIETY: ICD-10-CM

## 2020-06-30 DIAGNOSIS — E03.9 ACQUIRED HYPOTHYROIDISM: ICD-10-CM

## 2020-07-01 ENCOUNTER — TELEPHONE (OUTPATIENT)
Dept: OTHER | Facility: OTHER | Age: 70
End: 2020-07-01

## 2020-07-01 ENCOUNTER — TELEMEDICINE (OUTPATIENT)
Dept: FAMILY MEDICINE CLINIC | Facility: CLINIC | Age: 70
End: 2020-07-01
Payer: COMMERCIAL

## 2020-07-01 VITALS — WEIGHT: 170 LBS | HEIGHT: 62 IN | HEART RATE: 72 BPM | BODY MASS INDEX: 31.28 KG/M2 | TEMPERATURE: 97.7 F

## 2020-07-01 DIAGNOSIS — R42 VERTIGO: Primary | ICD-10-CM

## 2020-07-01 PROCEDURE — 1036F TOBACCO NON-USER: CPT | Performed by: FAMILY MEDICINE

## 2020-07-01 PROCEDURE — 3079F DIAST BP 80-89 MM HG: CPT | Performed by: FAMILY MEDICINE

## 2020-07-01 PROCEDURE — 99213 OFFICE O/P EST LOW 20 MIN: CPT | Performed by: FAMILY MEDICINE

## 2020-07-01 PROCEDURE — 1160F RVW MEDS BY RX/DR IN RCRD: CPT | Performed by: FAMILY MEDICINE

## 2020-07-01 PROCEDURE — 3077F SYST BP >= 140 MM HG: CPT | Performed by: FAMILY MEDICINE

## 2020-07-01 PROCEDURE — 4040F PNEUMOC VAC/ADMIN/RCVD: CPT | Performed by: FAMILY MEDICINE

## 2020-07-01 RX ORDER — DIAZEPAM 5 MG/1
5 TABLET ORAL EVERY 8 HOURS PRN
Qty: 20 TABLET | Refills: 0 | Status: SHIPPED | OUTPATIENT
Start: 2020-07-01 | End: 2020-10-08 | Stop reason: ALTCHOICE

## 2020-07-01 RX ORDER — LEVOTHYROXINE SODIUM 0.03 MG/1
TABLET ORAL
Qty: 90 TABLET | Refills: 0 | Status: SHIPPED | OUTPATIENT
Start: 2020-07-01 | End: 2020-09-29

## 2020-07-01 RX ORDER — CITALOPRAM 40 MG/1
TABLET ORAL
Qty: 90 TABLET | Refills: 0 | Status: SHIPPED | OUTPATIENT
Start: 2020-07-01 | End: 2020-08-19 | Stop reason: SDUPTHER

## 2020-07-01 NOTE — PROGRESS NOTES
Assessment/Plan:      There are no diagnoses linked to this encounter  Subjective:  Chief Complaint   Patient presents with    Dizziness     PT CALLS IN FOR VERTIGO SX'S X 7 DAYS  Patient ID: Elizabeth Vaughn is a 68 y o  female  Took dramammine and sween by chiropractor  Mostly when she is changing positions         Review of Systems      The following portions of the patient's history were reviewed and updated as appropriate: allergies, current medications, past family history, past medical history, past social history, past surgical history and problem list     Objective:  Vitals:    07/01/20 1057   Pulse: 72   Temp: 97 7 °F (36 5 °C)   Weight: 77 1 kg (170 lb)   Height: 5' 2" (1 575 m)      Physical Exam

## 2020-07-02 PROBLEM — R42 VERTIGO: Status: ACTIVE | Noted: 2020-07-02

## 2020-07-02 NOTE — PROGRESS NOTES
Virtual Regular Visit      Assessment/Plan:    Problem List Items Addressed This Visit        Other    Vertigo - Primary    Relevant Medications    diazepam (VALIUM) 5 mg tablet               Reason for visit is   Chief Complaint   Patient presents with    Dizziness     PT CALLS IN FOR VERTIGO SX'S X 7 DAYS   Virtual Regular Visit        Encounter provider Shelby Casanova DO    Provider located at [address] [place] [id number]      Recent Visits  Date Type Provider Dept   07/01/20 Telemedicine Shelby Casanova DO [facility]   Showing recent visits within past 7 days and meeting all other requirements     Future Appointments  No visits were found meeting these conditions  Showing future appointments within next 150 days and meeting all other requirements        The patient was identified by name and date of birth  Elizabeth Vaughn was informed that this is a telemedicine visit and that the visit is being conducted through [facility] and patient was informed that this is a secure, HIPAA-compliant platform  She agrees to proceed     My office door was closed  No one else was in the room  She acknowledged consent and understanding of privacy and security of the video platform  The patient has agreed to participate and understands they can discontinue the visit at any time  Patient is aware this is a billable service  Subjective  Elizabeth Vaugnh is a 68 y o  female    Pt seen by virtual visit today  She is in her home and I am in my office  She Complains of dizziness for the past week  She Took dramammine and was seen by her chiropractor without much relief  Mostly when she is changing positions  Some nausea, no vomiting  No changes in bms  No fever          Past Medical History:   Diagnosis Date    Asthma     Disease of thyroid gland     GERD (gastroesophageal reflux disease)     Migraine     Raynaud disease     S/P trigger finger release     Sleep apnea     Visual disturbance        Past Surgical History:   Procedure Laterality Date    CATARACT EXTRACTION      COLON SURGERY      GLAUCOMA SURGERY      HALLUX VALGUS CORRECTION Right 06/19/2007    INCISION TENDON SHEATH HAND  05/23/2011    Release of A1 pulley    KNEE ARTHROSCOPY W/ MENISCAL REPAIR Left 2003    [place] ESOPHAGOGASTRODUODENOSCOPY TRANSORAL DIAGNOSTIC N/A 8/22/2018    Procedure: ESOPHAGOGASTRODUODENOSCOPY (EGD);   Surgeon: Emre Gannon MD;  [facility];  Service: Gastroenterology    NE INCISE FINGER TENDON SHEATH Left 7/11/2019    Procedure: RELEASE TRIGGER FINGER left ring;  Surgeon: Amparo Vogt MD;  [facility];  Service: Orthopedics    TRIGGER FINGER RELEASE      UPPER GASTROINTESTINAL ENDOSCOPY         Current Outpatient Medications   Medication Sig Dispense Refill    albuterol (2 5 mg/3 mL) 0 083 % nebulizer solution Take 1 vial (2 5 mg total) by nebulization every 6 (six) hours as needed for wheezing or shortness of breath 45 vial 0    albuterol (PROAIR HFA) 90 mcg/act inhaler Inhale 2 puffs every 6 (six) hours as needed for wheezing 18 g 2    amLODIPine (NORVASC) 5 mg tablet TAKE ONE TABLET BY MOUTH DAILY 90 tablet 2    aspirin 81 mg chewable tablet Chew 1 tablet daily      busPIRone (BUSPAR) 10 mg tablet Take 1 tablet (10 mg total) by mouth 3 (three) times a day (Patient taking differently: Take 10 mg by mouth as needed ) 60 tablet 1    citalopram (CeleXA) 40 mg tablet TAKE ONE TABLET BY MOUTH EVERY DAY 90 tablet 0    diphenoxylate-atropine (LOMOTIL) 2 5-0 025 mg per tablet Take 1 tablet by mouth daily as needed      levothyroxine 25 mcg tablet TAKE ONE TABLET BY MOUTH EVERY DAY 90 tablet 0    Multiple Vitamin (MULTIVITAMIN) tablet Take 1 tablet by mouth daily      omeprazole (PriLOSEC) 40 MG capsule TAKE ONE CAPSULE BY MOUTH EVERY DAY 90 capsule 0    silver sulfadiazine (SILVADENE,SSD) 1 % cream Apply topically daily 50 g 0    cyclobenzaprine (FLEXERIL) 10 mg tablet Take 1 tablet (10 mg total) by mouth daily at bedtime (Patient not taking: Reported on 7/1/2020) 20 tablet 0    diazepam (VALIUM) 5 mg tablet Take 1 tablet (5 mg total) by mouth every 8 (eight) hours as needed (dizziness) 20 tablet 0    naproxen (NAPROSYN) 500 mg tablet Take 1 tablet (500 mg total) by mouth 2 (two) times a day with meals for 14 days 28 tablet 0     No current facility-administered medications for this visit  Allergies   Allergen Reactions    Other Shortness Of Breath     ROSES   Lumigan [Bimatoprost] Itching    Pneumococcal Polysaccharide Vaccine Fever     Other reaction(s): Chills, fever and mottling  Category: Allergy; Review of Systems   Constitutional: Negative  Negative for fatigue and fever  HENT: Negative  Eyes: Negative  Respiratory: Negative  Negative for cough  Cardiovascular: Negative  Gastrointestinal: Positive for nausea  Endocrine: Negative  Genitourinary: Negative  Musculoskeletal: Negative  Skin: Negative  Allergic/Immunologic: Negative  Neurological: Positive for dizziness  Psychiatric/Behavioral: Negative  Video Exam    Vitals:    07/01/20 1057   Pulse: 72   Temp: 97 7 °F (36 5 °C)   Weight: 77 1 kg (170 lb)   Height: 5' 2" (1 575 m)       Physical Exam   Constitutional: She is oriented to person, place, and time  She appears well-developed and well-nourished  Eyes: Conjunctivae are normal    Pulmonary/Chest: Effort normal    Neurological: She is alert and oriented to person, place, and time  Psychiatric: She has a normal mood and affect  Her behavior is normal  Judgment and thought content normal    Nursing note and vitals reviewed  As a result of this visit, I have not referred the patient for further respiratory evaluation      I spent 15 minutes directly with the patient during this visit      VIRTUAL VISIT DISCLAIMER    Santi Wu acknowledges that she has consented to an online visit or consultation  She understands that the online visit is based solely on information provided by her, and that, in the absence of a face-to-face physical evaluation by the physician, the diagnosis she receives is both limited and provisional in terms of accuracy and completeness  This is not intended to replace a full medical face-to-face evaluation by the physician  Moriah Del Rio understands and accepts these terms

## 2020-07-06 NOTE — PATIENT INSTRUCTIONS
Diazepam 1 tab 3 times a day as needed  Consider ent evaluation  Failed meclizine and chiropractic care, deferring physical therapy currently for vestibular therapy

## 2020-07-29 ENCOUNTER — HOSPITAL ENCOUNTER (OUTPATIENT)
Dept: MRI IMAGING | Facility: HOSPITAL | Age: 70
Discharge: HOME | End: 2020-07-29
Payer: COMMERCIAL

## 2020-07-29 DIAGNOSIS — IMO0001 ASYMMETRICAL HEARING LOSS, RIGHT: ICD-10-CM

## 2020-07-29 PROCEDURE — A9585 GADOBUTROL INJECTION: HCPCS | Performed by: OTOLARYNGOLOGY

## 2020-07-29 PROCEDURE — 70553 MRI BRAIN STEM W/O & W/DYE: CPT

## 2020-07-29 RX ADMIN — GADOBUTROL 7 ML: 604.72 INJECTION INTRAVENOUS at 16:01

## 2020-08-19 ENCOUNTER — OFFICE VISIT (OUTPATIENT)
Dept: FAMILY MEDICINE CLINIC | Facility: CLINIC | Age: 70
End: 2020-08-19
Payer: COMMERCIAL

## 2020-08-19 VITALS
BODY MASS INDEX: 32.02 KG/M2 | WEIGHT: 174 LBS | TEMPERATURE: 98.9 F | HEART RATE: 90 BPM | OXYGEN SATURATION: 97 % | DIASTOLIC BLOOD PRESSURE: 80 MMHG | HEIGHT: 62 IN | SYSTOLIC BLOOD PRESSURE: 150 MMHG

## 2020-08-19 DIAGNOSIS — R07.89 ATYPICAL CHEST PAIN: ICD-10-CM

## 2020-08-19 DIAGNOSIS — R00.0 TACHYCARDIA: Primary | ICD-10-CM

## 2020-08-19 DIAGNOSIS — K44.9 HIATAL HERNIA: ICD-10-CM

## 2020-08-19 DIAGNOSIS — F41.9 ANXIETY: ICD-10-CM

## 2020-08-19 DIAGNOSIS — R01.1 SYSTOLIC EJECTION MURMUR: ICD-10-CM

## 2020-08-19 PROCEDURE — 3079F DIAST BP 80-89 MM HG: CPT | Performed by: FAMILY MEDICINE

## 2020-08-19 PROCEDURE — 3077F SYST BP >= 140 MM HG: CPT | Performed by: FAMILY MEDICINE

## 2020-08-19 PROCEDURE — 1160F RVW MEDS BY RX/DR IN RCRD: CPT | Performed by: FAMILY MEDICINE

## 2020-08-19 PROCEDURE — 3008F BODY MASS INDEX DOCD: CPT | Performed by: FAMILY MEDICINE

## 2020-08-19 PROCEDURE — 1036F TOBACCO NON-USER: CPT | Performed by: FAMILY MEDICINE

## 2020-08-19 PROCEDURE — 99214 OFFICE O/P EST MOD 30 MIN: CPT | Performed by: FAMILY MEDICINE

## 2020-08-19 PROCEDURE — 4040F PNEUMOC VAC/ADMIN/RCVD: CPT | Performed by: FAMILY MEDICINE

## 2020-08-19 RX ORDER — CITALOPRAM 40 MG/1
40 TABLET ORAL DAILY
Qty: 90 TABLET | Refills: 0 | Status: SHIPPED | OUTPATIENT
Start: 2020-08-19 | End: 2020-12-21

## 2020-08-19 RX ORDER — OMEPRAZOLE 40 MG/1
40 CAPSULE, DELAYED RELEASE ORAL DAILY
Qty: 90 CAPSULE | Refills: 0 | Status: SHIPPED | OUTPATIENT
Start: 2020-08-19 | End: 2020-10-08 | Stop reason: ALTCHOICE

## 2020-08-19 NOTE — PROGRESS NOTES
Subjective:   Chief Complaint   Patient presents with    rapid heart rate     pt states this morning her pause was 120, pt states she has a awareness on both side of her chest area for the last couple weeks        Patient ID: Kingston Noyola is a 68 y o  female      The pt is here today because she was sitting at home this morning and felt strange  She has a relatively new pulse ox and decided to check her pulse and oxygen   Her pulse was 120, oxygen level was fine  She did feel a strange sensation through the upper chest, just above the breasts but on both sides  She has felt this before and actually felt it for quite a while but not to this extent  She denied any shortness of breath at that time  She denied any overt chest pain otherwise  This feeling did generally resolve in her pulse moved between 100-120 for a little while and then dropped down into the 70s  It is 90 now  She did check the pulse ox on her  and his pulse was normal at the same time hers was 120  She felt some anxiety at that time so does wonder if there was some anxiety there  She felt like her heart felt different , though, the normal  She does not necessarily describe palpitations but just something that was different    She has had a rough couple of months  She has had really significant vertigo  She did see ENT and has been diagnosed with Vertigo   Basically she was advised that she just has to wait, there is nothing really to do to make this go away any faster  She is spinning all the time  She does not drive because of this, her  has been driving her around  It has been very stressful        The following portions of the patient's history were reviewed and updated as appropriate: allergies, current medications, past family history, past medical history, past social history, past surgical history and problem list     Review of Systems          Objective:  Vitals:    08/19/20 1543   BP: 150/80   Pulse: 90   Temp: 98 9 °F (37 2 °C)   SpO2: 97%   Weight: 78 9 kg (174 lb)   Height: 5' 2" (1 575 m)      Physical Exam  Constitutional:       General: She is not in acute distress  Appearance: Normal appearance  She is not ill-appearing  Neck:      Musculoskeletal: Normal range of motion  Cardiovascular:      Rate and Rhythm: Normal rate and regular rhythm  Heart sounds: Murmur (3/6 systolic) present  Pulmonary:      Effort: Pulmonary effort is normal  No respiratory distress  Breath sounds: Normal breath sounds  No wheezing, rhonchi or rales  Skin:     General: Skin is warm and dry  Findings: No erythema or rash  Neurological:      General: No focal deficit present  Mental Status: She is alert and oriented to person, place, and time  Cranial Nerves: No cranial nerve deficit  Gait: Gait normal    Psychiatric:         Mood and Affect: Mood normal          Behavior: Behavior normal          Thought Content: Thought content normal          Judgment: Judgment normal            Assessment/Plan:    No problem-specific Assessment & Plan notes found for this encounter  The patient does have a murmur, she reports that she had rheumatic heart disease as a child this murmur has been there since then  She had an EKG in January 2019 that was completely normal   Her rhythm is completely regular in the office today  Because she is completely stable with a regular rhythm and normal heart rate in the office I do not feel any urgent testing is needed  I am going to send her for an echocardiogram and EKG  If anything is abnormal I would then refer her to cardiology  I did advise that if this comes back and happens repeatedly, or lasts for a long time, or is associated with chest pain or anything else that is at all concerning she should go to the emergency room  Diagnoses and all orders for this visit:    Tachycardia  -     Echo complete with contrast if indicated;  Future  -     ECG 12 lead; Future    Systolic ejection murmur  -     Echo complete with contrast if indicated; Future  -     ECG 12 lead; Future    Atypical chest pain  -     Echo complete with contrast if indicated; Future  -     ECG 12 lead;  Future

## 2020-08-19 NOTE — TELEPHONE ENCOUNTER
RX REFILL FOR OMEPRAZOLE AND CITALOPRAM CALLED IN TO THE [facility] IN [place]

## 2020-09-12 ENCOUNTER — TELEPHONE (OUTPATIENT)
Dept: OTHER | Facility: OTHER | Age: 70
End: 2020-09-12

## 2020-09-12 NOTE — TELEPHONE ENCOUNTER
Patient called answering service to cancel virtual appt with Dr Stefanie Caban for today, 9/12/2020 stating she is going to have her EKG and ECHO done and then reschedule her visit   Thanks

## 2020-09-17 ENCOUNTER — TELEPHONE (OUTPATIENT)
Dept: FAMILY MEDICINE CLINIC | Facility: CLINIC | Age: 70
End: 2020-09-17

## 2020-09-21 ENCOUNTER — HOSPITAL ENCOUNTER (OUTPATIENT)
Dept: NON INVASIVE DIAGNOSTICS | Facility: HOSPITAL | Age: 70
Discharge: HOME | End: 2020-09-21
Attending: FAMILY MEDICINE
Payer: COMMERCIAL

## 2020-09-21 DIAGNOSIS — R07.89 ATYPICAL CHEST PAIN: ICD-10-CM

## 2020-09-21 DIAGNOSIS — R00.0 TACHYCARDIA: ICD-10-CM

## 2020-09-21 DIAGNOSIS — R01.1 SYSTOLIC EJECTION MURMUR: ICD-10-CM

## 2020-09-21 PROCEDURE — 93306 TTE W/DOPPLER COMPLETE: CPT | Performed by: INTERNAL MEDICINE

## 2020-09-21 PROCEDURE — 93306 TTE W/DOPPLER COMPLETE: CPT

## 2020-09-21 PROCEDURE — 93005 ELECTROCARDIOGRAM TRACING: CPT

## 2020-09-22 LAB
ATRIAL RATE: 62 BPM
P AXIS: 50 DEGREES
PR INTERVAL: 150 MS
QRS AXIS: 2 DEGREES
QRSD INTERVAL: 92 MS
QT INTERVAL: 458 MS
QTC INTERVAL: 464 MS
T WAVE AXIS: 45 DEGREES
VENTRICULAR RATE: 62 BPM

## 2020-09-22 PROCEDURE — 93010 ELECTROCARDIOGRAM REPORT: CPT | Performed by: INTERNAL MEDICINE

## 2020-09-29 DIAGNOSIS — E03.9 ACQUIRED HYPOTHYROIDISM: ICD-10-CM

## 2020-09-29 RX ORDER — LEVOTHYROXINE SODIUM 0.03 MG/1
TABLET ORAL
Qty: 90 TABLET | Refills: 0 | Status: SHIPPED | OUTPATIENT
Start: 2020-09-29 | End: 2020-12-28

## 2020-10-08 DIAGNOSIS — K21.9 GASTROESOPHAGEAL REFLUX DISEASE WITHOUT ESOPHAGITIS: Primary | ICD-10-CM

## 2020-10-08 RX ORDER — PANTOPRAZOLE SODIUM 40 MG/1
40 TABLET, DELAYED RELEASE ORAL
Qty: 30 TABLET | Refills: 5 | Status: SHIPPED | OUTPATIENT
Start: 2020-10-08 | End: 2020-10-20 | Stop reason: SDUPTHER

## 2020-10-20 ENCOUNTER — OFFICE VISIT (OUTPATIENT)
Dept: FAMILY MEDICINE CLINIC | Facility: CLINIC | Age: 70
End: 2020-10-20
Payer: COMMERCIAL

## 2020-10-20 VITALS
TEMPERATURE: 98.8 F | BODY MASS INDEX: 31.83 KG/M2 | DIASTOLIC BLOOD PRESSURE: 76 MMHG | SYSTOLIC BLOOD PRESSURE: 140 MMHG | HEART RATE: 80 BPM | HEIGHT: 62 IN | OXYGEN SATURATION: 94 % | WEIGHT: 173 LBS

## 2020-10-20 DIAGNOSIS — R07.89 ATYPICAL CHEST PAIN: Primary | ICD-10-CM

## 2020-10-20 DIAGNOSIS — E03.9 ACQUIRED HYPOTHYROIDISM: ICD-10-CM

## 2020-10-20 DIAGNOSIS — E66.9 OBESITY (BMI 30.0-34.9): ICD-10-CM

## 2020-10-20 DIAGNOSIS — K21.9 GASTROESOPHAGEAL REFLUX DISEASE WITHOUT ESOPHAGITIS: ICD-10-CM

## 2020-10-20 DIAGNOSIS — I10 ESSENTIAL HYPERTENSION: ICD-10-CM

## 2020-10-20 PROCEDURE — 3078F DIAST BP <80 MM HG: CPT | Performed by: FAMILY MEDICINE

## 2020-10-20 PROCEDURE — 3077F SYST BP >= 140 MM HG: CPT | Performed by: FAMILY MEDICINE

## 2020-10-20 PROCEDURE — 99214 OFFICE O/P EST MOD 30 MIN: CPT | Performed by: FAMILY MEDICINE

## 2020-10-20 PROCEDURE — 1036F TOBACCO NON-USER: CPT | Performed by: FAMILY MEDICINE

## 2020-10-20 PROCEDURE — 1160F RVW MEDS BY RX/DR IN RCRD: CPT | Performed by: FAMILY MEDICINE

## 2020-10-20 RX ORDER — PANTOPRAZOLE SODIUM 40 MG/1
40 TABLET, DELAYED RELEASE ORAL
Qty: 90 TABLET | Refills: 3 | Status: SHIPPED | OUTPATIENT
Start: 2020-10-20 | End: 2021-02-08 | Stop reason: SDUPTHER

## 2020-10-21 PROBLEM — R14.0 ABDOMINAL BLOATING: Status: RESOLVED | Noted: 2019-11-02 | Resolved: 2020-10-21

## 2020-10-21 PROBLEM — Z23 NEED FOR IMMUNIZATION AGAINST INFLUENZA: Status: ACTIVE | Noted: 2020-09-02

## 2020-10-21 PROBLEM — IMO0001 ASYMMETRICAL HEARING LOSS OF BOTH EARS: Status: ACTIVE | Noted: 2020-06-25

## 2020-10-23 ENCOUNTER — HOSPITAL ENCOUNTER (OUTPATIENT)
Dept: RADIOLOGY | Facility: HOSPITAL | Age: 70
Discharge: HOME | End: 2020-10-23
Payer: COMMERCIAL

## 2020-10-23 DIAGNOSIS — R07.89 ATYPICAL CHEST PAIN: ICD-10-CM

## 2020-10-23 PROCEDURE — 71046 X-RAY EXAM CHEST 2 VIEWS: CPT

## 2020-10-27 ENCOUNTER — TELEPHONE (OUTPATIENT)
Dept: FAMILY MEDICINE CLINIC | Facility: CLINIC | Age: 70
End: 2020-10-27

## 2020-10-28 DIAGNOSIS — J45.20 MILD INTERMITTENT ASTHMA WITHOUT COMPLICATION: ICD-10-CM

## 2020-10-28 DIAGNOSIS — J45.20 MILD INTERMITTENT REACTIVE AIRWAY DISEASE WITHOUT COMPLICATION: ICD-10-CM

## 2020-10-28 RX ORDER — ALBUTEROL SULFATE 90 UG/1
2 AEROSOL, METERED RESPIRATORY (INHALATION) EVERY 6 HOURS PRN
Qty: 18 G | Refills: 0 | Status: SHIPPED | OUTPATIENT
Start: 2020-10-28 | End: 2021-02-08 | Stop reason: SDUPTHER

## 2020-10-28 RX ORDER — ALBUTEROL SULFATE 2.5 MG/3ML
2.5 SOLUTION RESPIRATORY (INHALATION) EVERY 6 HOURS PRN
Qty: 45 VIAL | Refills: 0 | Status: SHIPPED | OUTPATIENT
Start: 2020-10-28

## 2020-12-02 ENCOUNTER — OFFICE VISIT (OUTPATIENT)
Dept: FAMILY MEDICINE CLINIC | Facility: CLINIC | Age: 70
End: 2020-12-02
Payer: COMMERCIAL

## 2020-12-02 VITALS
OXYGEN SATURATION: 97 % | BODY MASS INDEX: 31.83 KG/M2 | HEIGHT: 62 IN | DIASTOLIC BLOOD PRESSURE: 70 MMHG | HEART RATE: 67 BPM | SYSTOLIC BLOOD PRESSURE: 138 MMHG | WEIGHT: 173 LBS | TEMPERATURE: 98.5 F

## 2020-12-02 DIAGNOSIS — R22.1 NECK MASS: Primary | ICD-10-CM

## 2020-12-02 DIAGNOSIS — I10 ESSENTIAL HYPERTENSION: ICD-10-CM

## 2020-12-02 PROBLEM — Z23 NEED FOR IMMUNIZATION AGAINST INFLUENZA: Status: RESOLVED | Noted: 2020-09-02 | Resolved: 2020-12-02

## 2020-12-02 PROBLEM — K76.0 FATTY LIVER: Status: RESOLVED | Noted: 2019-11-02 | Resolved: 2020-12-02

## 2020-12-02 PROCEDURE — 3288F FALL RISK ASSESSMENT DOCD: CPT | Performed by: FAMILY MEDICINE

## 2020-12-02 PROCEDURE — 3075F SYST BP GE 130 - 139MM HG: CPT | Performed by: FAMILY MEDICINE

## 2020-12-02 PROCEDURE — 3008F BODY MASS INDEX DOCD: CPT | Performed by: FAMILY MEDICINE

## 2020-12-02 PROCEDURE — 1036F TOBACCO NON-USER: CPT | Performed by: FAMILY MEDICINE

## 2020-12-02 PROCEDURE — 3078F DIAST BP <80 MM HG: CPT | Performed by: FAMILY MEDICINE

## 2020-12-02 PROCEDURE — 1160F RVW MEDS BY RX/DR IN RCRD: CPT | Performed by: FAMILY MEDICINE

## 2020-12-02 PROCEDURE — 1101F PT FALLS ASSESS-DOCD LE1/YR: CPT | Performed by: FAMILY MEDICINE

## 2020-12-02 PROCEDURE — 99213 OFFICE O/P EST LOW 20 MIN: CPT | Performed by: FAMILY MEDICINE

## 2020-12-02 PROCEDURE — 3725F SCREEN DEPRESSION PERFORMED: CPT | Performed by: FAMILY MEDICINE

## 2020-12-21 DIAGNOSIS — F41.9 ANXIETY: ICD-10-CM

## 2020-12-21 RX ORDER — CITALOPRAM 40 MG/1
TABLET ORAL
Qty: 90 TABLET | Refills: 0 | Status: SHIPPED | OUTPATIENT
Start: 2020-12-21 | End: 2021-01-05 | Stop reason: SDUPTHER

## 2020-12-24 DIAGNOSIS — E03.9 ACQUIRED HYPOTHYROIDISM: ICD-10-CM

## 2020-12-28 RX ORDER — LEVOTHYROXINE SODIUM 0.03 MG/1
TABLET ORAL
Qty: 90 TABLET | Refills: 0 | Status: SHIPPED | OUTPATIENT
Start: 2020-12-28 | End: 2021-03-24

## 2021-01-05 ENCOUNTER — OFFICE VISIT (OUTPATIENT)
Dept: FAMILY MEDICINE CLINIC | Facility: CLINIC | Age: 71
End: 2021-01-05
Payer: COMMERCIAL

## 2021-01-05 VITALS
SYSTOLIC BLOOD PRESSURE: 135 MMHG | DIASTOLIC BLOOD PRESSURE: 80 MMHG | BODY MASS INDEX: 31.47 KG/M2 | WEIGHT: 171 LBS | OXYGEN SATURATION: 98 % | HEIGHT: 62 IN | HEART RATE: 86 BPM | TEMPERATURE: 98 F

## 2021-01-05 DIAGNOSIS — M94.0 COSTOCHONDRITIS: Primary | ICD-10-CM

## 2021-01-05 DIAGNOSIS — I10 ESSENTIAL HYPERTENSION: ICD-10-CM

## 2021-01-05 DIAGNOSIS — Z00.00 MEDICARE ANNUAL WELLNESS VISIT, SUBSEQUENT: ICD-10-CM

## 2021-01-05 DIAGNOSIS — F41.9 ANXIETY: ICD-10-CM

## 2021-01-05 PROBLEM — R42 VERTIGO: Status: RESOLVED | Noted: 2020-07-02 | Resolved: 2021-01-05

## 2021-01-05 PROBLEM — R10.11 RIGHT UPPER QUADRANT PAIN: Status: RESOLVED | Noted: 2019-11-02 | Resolved: 2021-01-05

## 2021-01-05 PROCEDURE — 3288F FALL RISK ASSESSMENT DOCD: CPT | Performed by: FAMILY MEDICINE

## 2021-01-05 PROCEDURE — 3008F BODY MASS INDEX DOCD: CPT | Performed by: FAMILY MEDICINE

## 2021-01-05 PROCEDURE — 99213 OFFICE O/P EST LOW 20 MIN: CPT | Performed by: FAMILY MEDICINE

## 2021-01-05 PROCEDURE — G0439 PPPS, SUBSEQ VISIT: HCPCS | Performed by: FAMILY MEDICINE

## 2021-01-05 PROCEDURE — 1170F FXNL STATUS ASSESSED: CPT | Performed by: FAMILY MEDICINE

## 2021-01-05 PROCEDURE — 1125F AMNT PAIN NOTED PAIN PRSNT: CPT | Performed by: FAMILY MEDICINE

## 2021-01-05 PROCEDURE — 3725F SCREEN DEPRESSION PERFORMED: CPT | Performed by: FAMILY MEDICINE

## 2021-01-05 PROCEDURE — 3079F DIAST BP 80-89 MM HG: CPT | Performed by: FAMILY MEDICINE

## 2021-01-05 PROCEDURE — 1160F RVW MEDS BY RX/DR IN RCRD: CPT | Performed by: FAMILY MEDICINE

## 2021-01-05 PROCEDURE — 1036F TOBACCO NON-USER: CPT | Performed by: FAMILY MEDICINE

## 2021-01-05 PROCEDURE — 3075F SYST BP GE 130 - 139MM HG: CPT | Performed by: FAMILY MEDICINE

## 2021-01-05 RX ORDER — CITALOPRAM 40 MG/1
40 TABLET ORAL DAILY
Qty: 90 TABLET | Refills: 3 | Status: SHIPPED | OUTPATIENT
Start: 2021-01-05 | End: 2022-02-27

## 2021-01-05 RX ORDER — OMEPRAZOLE 40 MG/1
40 CAPSULE, DELAYED RELEASE ORAL DAILY
COMMUNITY
Start: 2020-12-14 | End: 2021-03-06

## 2021-01-05 NOTE — PATIENT INSTRUCTIONS
Costochondritis   AMBULATORY CARE:   Costochondritis  is a condition that causes pain in the cartilage that connects your ribs to your sternum (breastbone)  Cartilage is the tough, bendable tissue that protects your bones  Common symptoms include the following:   · Sharp or dull, aching pain that may come and go or that gets worse over time    · Pain when you touch your chest    · Pain that spreads to your back, abdomen, or down your arm    · Pain that gets worse when you move, breathe deeply, or push or lift an object    · Trouble sleeping or doing your usual activities because of the pain    Seek immediate care if:   · Fever    · The painful areas of your chest look swollen and red, and feel warm to the touch    · Pain prevents you from sleeping    Contact your healthcare provider if:   · You have a fever  · The painful areas of your chest look swollen, red, and feel warm to the touch  · You cannot sleep because of the pain  · You have questions or concerns about your condition or care  Treatment for costochondritis  may not be needed  Costochondritis pain may go away without treatment, usually within a year  Treatment may include any of the following:  · Acetaminophen  decreases pain  Acetaminophen is available without a doctor's order  Ask how much to take and how often to take it  Follow directions  Acetaminophen can cause liver damage if not taken correctly  · NSAIDs , such as ibuprofen, help decrease swelling, pain, and fever  This medicine is available with or without a doctor's order  NSAIDs can cause stomach bleeding or kidney problems in certain people  If you take blood thinner medicine, always ask if NSAIDs are safe for you  Always read the medicine label and follow directions  Do not give these medicines to children under 10months of age without direction from your child's healthcare provider  Manage your symptoms:   · Rest as needed  Avoid painful movements and activities   Do not carry objects, such as a purse or backpack, if this causes pain  Avoid activities such as weightlifting until your pain decreases or goes away  Ask your healthcare provider which activities are best for you to do while you recover  · Apply heat to help decrease pain  Apply heat on the area for 20 to 30 minutes every 2 hours for as many days as directed  · Apply ice to help decrease swelling and pain  Ice may also help prevent tissue damage  Use an ice pack, or put crushed ice in a plastic bag  Cover it with a towel and place it on the painful area for 15 to 20 minutes every hour or as directed  · Do gentle stretching exercises   a doorway and put your hands on the door frame at the level of your ears or shoulders  Take 1 step forward and gently stretch your chest  Try this with your hands higher up on the doorway  Follow up with your healthcare provider as directed:  Write down your questions so you remember to ask them during your visits  © Copyright [address] Information is for End User's use only and may not be sold, redistributed or otherwise used for commercial purposes  All illustrations and images included in CareNotes® are the copyrighted property of [employer] D A M [employer] Inc  or [employer]   The above information is an  only  It is not intended as medical advice for individual conditions or treatments  Talk to your doctor, nurse or pharmacist before following any medical regimen to see if it is safe and effective for you  Costochondritis:  cbd cream  voltaren gel  Biofreeze  advil or motrin with food  Consider physical therapy   Medicare Preventive Visit Patient Instructions  Thank you for completing your Welcome to Medicare Visit or Medicare Annual Wellness Visit today  Your next wellness visit will be due in one year (1/5/2022)  The screening/preventive services that you may require over the next 5-10 years are detailed below   Some tests may not apply to you based off risk factors and/or age  Screening tests ordered at today's visit but not completed yet may show as past due  Also, please note that scanned in results may not display below  Preventive Screenings:  Service Recommendations Previous Testing/Comments   Colorectal Cancer Screening  * Colonoscopy    * Fecal Occult Blood Test (FOBT)/Fecal Immunochemical Test (FIT)  * Fecal DNA/Cologuard Test  * Flexible Sigmoidoscopy Age: 54-65 years old   Colonoscopy: every 10 years (may be performed more frequently if at higher risk)  OR  FOBT/FIT: every 1 year  OR  Cologuard: every 3 years  OR  Sigmoidoscopy: every 5 years  Screening may be recommended earlier than age 48 if at higher risk for colorectal cancer  Also, an individualized decision between you and your healthcare provider will decide whether screening between the ages of 74-80 would be appropriate  Colonoscopy: 06/13/2012  FOBT/FIT: Not on file  Cologuard: Not on file  Sigmoidoscopy: Not on file    Screening Current     Breast Cancer Screening Age: 36 years old  Frequency: every 1-2 years  Not required if history of left and right mastectomy Mammogram: 06/12/2020    Screening Current   Cervical Cancer Screening Between the ages of 21-29, pap smear recommended once every 3 years  Between the ages of 33-67, can perform pap smear with HPV co-testing every 5 years  Recommendations may differ for women with a history of total hysterectomy, cervical cancer, or abnormal pap smears in past  Pap Smear: Not on file    Screening Not Indicated   Hepatitis C Screening Once for adults born between 1945 and 1965  More frequently in patients at high risk for Hepatitis C Hep C Antibody: 09/07/2018    Screening Current   Diabetes Screening 1-2 times per year if you're at risk for diabetes or have pre-diabetes Fasting glucose: No results in last 5 years   A1C: 6 0 %    Screening Current   Cholesterol Screening Once every 5 years if you don't have a lipid disorder   May order more often based on risk factors  Lipid panel: 10/16/2019    Screening Not Indicated  History Lipid Disorder     Other Preventive Screenings Covered by Medicare:  1  Abdominal Aortic Aneurysm (AAA) Screening: covered once if your at risk  You're considered to be at risk if you have a family history of AAA  2  Lung Cancer Screening: covers low dose CT scan once per year if you meet all of the following conditions: (1) Age 50-69; (2) No signs or symptoms of lung cancer; (3) Current smoker or have quit smoking within the last 15 years; (4) You have a tobacco smoking history of at least 30 pack years (packs per day multiplied by number of years you smoked); (5) You get a written order from a healthcare provider  3  Glaucoma Screening: covered annually if you're considered high risk: (1) You have diabetes OR (2) Family history of glaucoma OR (3)  aged 48 and older OR (3)  American aged 72 and older  3  Osteoporosis Screening: covered every 2 years if you meet one of the following conditions: (1) You're estrogen deficient and at risk for osteoporosis based off medical history and other findings; (2) Have a vertebral abnormality; (3) On glucocorticoid therapy for more than 3 months; (4) Have primary hyperparathyroidism; (5) On osteoporosis medications and need to assess response to drug therapy  · Last bone density test (DXA Scan): 11/05/2013  5  HIV Screening: covered annually if you're between the age of 12-76  Also covered annually if you are younger than 13 and older than 72 with risk factors for HIV infection  For pregnant patients, it is covered up to 3 times per pregnancy      Immunizations:  Immunization Recommendations   Influenza Vaccine Annual influenza vaccination during flu season is recommended for all persons aged >= 6 months who do not have contraindications   Pneumococcal Vaccine (Prevnar and Pneumovax)  * Prevnar = PCV13  * Pneumovax = PPSV23   Adults 25-60 years old: 1-3 doses may be recommended based on certain risk factors  Adults 72 years old: Prevnar (PCV13) vaccine recommended followed by Pneumovax (PPSV23) vaccine  If already received PPSV23 since turning 65, then PCV13 recommended at least one year after PPSV23 dose  Hepatitis B Vaccine 3 dose series if at intermediate or high risk (ex: diabetes, end stage renal disease, liver disease)   Tetanus (Td) Vaccine - COST NOT COVERED BY MEDICARE PART B Following completion of primary series, a booster dose should be given every 10 years to maintain immunity against tetanus  Td may also be given as tetanus wound prophylaxis  Tdap Vaccine - COST NOT COVERED BY MEDICARE PART B Recommended at least once for all adults  For pregnant patients, recommended with each pregnancy  Shingles Vaccine (Shingrix) - COST NOT COVERED BY MEDICARE PART B  2 shot series recommended in those aged 48 and above     Health Maintenance Due:      Topic Date Due    MAMMOGRAM  06/12/2021    Colorectal Cancer Screening  06/13/2022    Hepatitis C Screening  Completed     Immunizations Due:  There are no preventive care reminders to display for this patient  Advance Directives   What are advance directives? Advance directives are legal documents that state your wishes and plans for medical care  These plans are made ahead of time in case you lose your ability to make decisions for yourself  Advance directives can apply to any medical decision, such as the treatments you want, and if you want to donate organs  What are the types of advance directives? There are many types of advance directives, and each state has rules about how to use them  You may choose a combination of any of the following:  · Living will: This is a written record of the treatment you want  You can also choose which treatments you do not want, which to limit, and which to stop at a certain time  This includes surgery, medicine, IV fluid, and tube feedings     · Durable power of  for [facility]): This is a written record that states who you want to make healthcare choices for you when you are unable to make them for yourself  This person, called a proxy, is usually a family member or a friend  You may choose more than 1 proxy  · Do not resuscitate (DNR) order:  A DNR order is used in case your heart stops beating or you stop breathing  It is a request not to have certain forms of treatment, such as CPR  A DNR order may be included in other types of advance directives  · Medical directive: This covers the care that you want if you are in a coma, near death, or unable to make decisions for yourself  You can list the treatments you want for each condition  Treatment may include pain medicine, surgery, blood transfusions, dialysis, IV or tube feedings, and a ventilator (breathing machine)  · Values history: This document has questions about your views, beliefs, and how you feel and think about life  This information can help others choose the care that you would choose  Why are advance directives important? An advance directive helps you control your care  Although spoken wishes may be used, it is better to have your wishes written down  Spoken wishes can be misunderstood, or not followed  Treatments may be given even if you do not want them  An advance directive may make it easier for your family to make difficult choices about your care  Weight Management   Why it is important to manage your weight:  Being overweight increases your risk of health conditions such as heart disease, high blood pressure, type 2 diabetes, and certain types of cancer  It can also increase your risk for osteoarthritis, sleep apnea, and other respiratory problems  Aim for a slow, steady weight loss  Even a small amount of weight loss can lower your risk of health problems  How to lose weight safely:  A safe and healthy way to lose weight is to eat fewer calories and get regular exercise   You can lose up about 1 pound a week by decreasing the number of calories you eat by 500 calories each day  Healthy meal plan for weight management:  A healthy meal plan includes a variety of foods, contains fewer calories, and helps you stay healthy  A healthy meal plan includes the following:  · Eat whole-grain foods more often  A healthy meal plan should contain fiber  Fiber is the part of grains, fruits, and vegetables that is not broken down by your body  Whole-grain foods are healthy and provide extra fiber in your diet  Some examples of whole-grain foods are whole-wheat breads and pastas, oatmeal, brown rice, and bulgur  · Eat a variety of vegetables every day  Include dark, leafy greens such as spinach, kale, luis greens, and mustard greens  Eat yellow and orange vegetables such as carrots, sweet potatoes, and winter squash  · Eat a variety of fruits every day  Choose fresh or canned fruit (canned in its own juice or light syrup) instead of juice  Fruit juice has very little or no fiber  · Eat low-fat dairy foods  Drink fat-free (skim) milk or 1% milk  Eat fat-free yogurt and low-fat cottage cheese  Try low-fat cheeses such as mozzarella and other reduced-fat cheeses  · Choose meat and other protein foods that are low in fat  Choose beans or other legumes such as split peas or lentils  Choose fish, skinless poultry (chicken or turkey), or lean cuts of red meat (beef or pork)  Before you cook meat or poultry, cut off any visible fat  · Use less fat and oil  Try baking foods instead of frying them  Add less fat, such as margarine, sour cream, regular salad dressing and mayonnaise to foods  Eat fewer high-fat foods  Some examples of high-fat foods include french fries, doughnuts, ice cream, and cakes  · Eat fewer sweets  Limit foods and drinks that are high in sugar  This includes candy, cookies, regular soda, and sweetened drinks    Exercise:  Exercise at least 30 minutes per day on most days of the week  Some examples of exercise include walking, biking, dancing, and swimming  You can also fit in more physical activity by taking the stairs instead of the elevator or parking farther away from stores  Ask your healthcare provider about the best exercise plan for you  © Copyright [employer] 2018 Information is for End User's use only and may not be sold, redistributed or otherwise used for commercial purposes   All illustrations and images included in CareNotes® are the copyrighted property of A D A M , Inc  or [address] [place] [address] [employer]

## 2021-01-05 NOTE — PROGRESS NOTES
Assessment and Plan:     Problem List Items Addressed This Visit        Cardiovascular and Mediastinum    Hypertension       Other    Anxiety    Relevant Medications    citalopram (CeleXA) 40 mg tablet    BMI 31 0-31 9,adult - Primary        BMI Counseling: Body mass index is 31 28 kg/m²  The BMI is above normal  Nutrition recommendations include decreasing portion sizes  Exercise recommendations include exercising 3-5 times per week  No pharmacotherapy was ordered  Preventive health issues were discussed with patient, and age appropriate screening tests were ordered as noted in patient's After Visit Summary  Personalized health advice and appropriate referrals for health education or preventive services given if needed, as noted in patient's After Visit Summary  History of Present Illness:     Patient presents for Medicare Annual Wellness visit    Patient Care Team:  Phil Britton DO as PCP - DO Phil Milton DO     Problem List:     Patient Active Problem List   Diagnosis    Abnormal mammogram    Anxiety    Hiatal hernia    Hyperlipidemia    Hypothyroidism    Impaired fasting glucose    Obstructive sleep apnea    Raynaud's syndrome without gangrene    Reactive airway disease    Burping    Chronic fatigue    Rib sprain, initial encounter    Hypertension    Right wrist tendonitis    BMI 31 0-31 9,adult    Trigger finger, left ring finger    Strain of left trapezius muscle    Obesity (BMI 30 0-34  9)    Gastroesophageal reflux disease    Right upper quadrant pain    Oropharyngeal dysphagia    Muscle spasm    Atypical chest pain    Seborrheic keratosis    Vertigo    Nondisplaced fracture of right ulna    Asymmetrical hearing loss of both ears    Neck mass      Past Medical and Surgical History:     Past Medical History:   Diagnosis Date    Asthma     Disease of thyroid gland     GERD (gastroesophageal reflux disease)     Hearing loss     Migraine  Peripheral vertigo     Raynaud disease     S/P trigger finger release     Sleep apnea     Visual disturbance      Past Surgical History:   Procedure Laterality Date    CATARACT EXTRACTION      COLON SURGERY      GLAUCOMA SURGERY      HALLUX VALGUS CORRECTION Right 06/19/2007    INCISION TENDON SHEATH HAND  05/23/2011    Release of A1 pulley    KNEE ARTHROSCOPY W/ MENISCAL REPAIR Left 2003    MA ESOPHAGOGASTRODUODENOSCOPY TRANSORAL DIAGNOSTIC N/A 8/22/2018    Procedure: ESOPHAGOGASTRODUODENOSCOPY (EGD);   Surgeon: Breana Estrada MD;  Location: [facility];  Service: Gastroenterology    MA INCISE FINGER TENDON SHEATH Left 7/11/2019    Procedure: RELEASE TRIGGER FINGER left ring;  Surgeon: Wanda Ryan MD;  Location: [facility];  Service: Orthopedics    TRIGGER FINGER RELEASE      UPPER GASTROINTESTINAL ENDOSCOPY        Family History:     Family History   Problem Relation Age of Onset    Lung cancer Mother     Stomach cancer Father     Other Maternal Grandmother         Stroke syndrome    Other Maternal Aunt         Cardiomegaly    No Known Problems Sister     No Known Problems Daughter     No Known Problems Maternal Grandfather     No Known Problems Paternal Grandmother     No Known Problems Paternal Grandfather     No Known Problems Paternal Aunt     No Known Problems Paternal Aunt     No Known Problems Paternal Aunt       Social History:        Social History     Socioeconomic History    Marital status: /Civil Union     Spouse name: None    Number of children: None    Years of education: None    Highest education level: None   Occupational History    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)   Social Needs    Financial resource strain: None    Food insecurity     Worry: None     Inability: None    Transportation needs     Medical: None     Non-medical: None   Tobacco Use    Smoking status: Never Smoker    Smokeless tobacco: Never Used   Substance and Sexual Activity    Alcohol use:  Yes     Alcohol/week: 5 0 standard drinks     Types: 5 Cans of beer per week     Drinks per session: 1 or 2     Binge frequency: Never    Drug use: No    Sexual activity: None   Lifestyle    Physical activity     Days per week: None     Minutes per session: None    Stress: None   Relationships    Social connections     Talks on phone: None     Gets together: None     Attends Restorationist service: None     Active member of club or organization: None     Attends meetings of clubs or organizations: None     Relationship status: None    Intimate partner violence     Fear of current or ex partner: None     Emotionally abused: None     Physically abused: None     Forced sexual activity: None   Other Topics Concern    None   Social History Narrative    Daily coffee consumption (1 cup/day)    Patient has living will    Uses safety equipment - seatbelts      Medications and Allergies:     Current Outpatient Medications   Medication Sig Dispense Refill    albuterol (2 5 mg/3 mL) 0 083 % nebulizer solution Take 1 vial (2 5 mg total) by nebulization every 6 (six) hours as needed for wheezing or shortness of breath 45 vial 0    albuterol (ProAir HFA) 90 mcg/act inhaler Inhale 2 puffs every 6 (six) hours as needed for wheezing 18 g 0    amLODIPine (NORVASC) 5 mg tablet TAKE ONE TABLET BY MOUTH DAILY 90 tablet 2    aspirin 81 mg chewable tablet Chew 1 tablet daily      busPIRone (BUSPAR) 10 mg tablet Take 1 tablet (10 mg total) by mouth 3 (three) times a day (Patient taking differently: Take 10 mg by mouth as needed ) 60 tablet 1    citalopram (CeleXA) 40 mg tablet Take 1 tablet (40 mg total) by mouth daily 90 tablet 3    diphenoxylate-atropine (LOMOTIL) 2 5-0 025 mg per tablet Take 1 tablet by mouth daily as needed      levothyroxine 25 mcg tablet TAKE ONE TABLET BY MOUTH EVERY DAY 90 tablet 0    Multiple Vitamin (MULTIVITAMIN) tablet Take 1 tablet by mouth daily      omeprazole (PriLOSEC) 40 MG capsule Take 40 mg by mouth daily      pantoprazole (PROTONIX) 40 mg tablet Take 1 tablet (40 mg total) by mouth daily before breakfast 90 tablet 3    silver sulfadiazine (SILVADENE,SSD) 1 % cream Apply topically daily 50 g 0     No current facility-administered medications for this visit  Allergies   Allergen Reactions    Lumigan [Bimatoprost] Itching    Pneumococcal Polysaccharide Vaccine Fever     Other reaction(s): Chills, fever and mottling  Category: Allergy;     Corrie Allergic Rhinitis, Headache, Nasal Congestion and Visual Disturbance      Immunizations:     Immunization History   Administered Date(s) Administered    H1N1, All Formulations 11/03/2009    Hep B, adult 01/07/1999, 02/09/1999, 08/10/1999    INFLUENZA 10/17/2018, 10/24/2019    Influenza Injectable, MDCK, Preservative Free, Quadrivalent, 0 5 mL 10/24/2019    Influenza Split High Dose Preservative Free IM 10/27/2015, 09/14/2016    Influenza, high dose seasonal 0 7 mL 09/02/2020    Influenza, seasonal, injectable 1950, 10/15/2014    Pneumococcal Polysaccharide PPV23 01/01/2000    Tdap 01/01/2010, 10/12/2010    Zoster 02/02/2015      Health Maintenance:         Topic Date Due    MAMMOGRAM  06/12/2021    Colorectal Cancer Screening  06/13/2022    Hepatitis C Screening  Completed     There are no preventive care reminders to display for this patient  Medicare Health Risk Assessment:     /80   Pulse 86   Temp 98 °F (36 7 °C)   Ht 5' 2" (1 575 m)   Wt 77 6 kg (171 lb)   SpO2 98%   BMI 31 28 kg/m²      Dariana Riggs is here for her Subsequent Wellness visit  Last Medicare Wellness visit information reviewed, patient interviewed and updates made to the record today  Health Risk Assessment:   Patient rates overall health as very good  Patient feels that their physical health rating is same  Eyesight was rated as same  Hearing was rated as same   Patient feels that their emotional and mental health rating is same  Pain experienced in the last 7 days has been some  Patient's pain rating has been 4/10  Patient states that she has experienced no weight loss or gain in last 6 months  Depression Screening:   PHQ-2 Score: 0      Fall Risk Screening: In the past year, patient has experienced: no history of falling in past year      Urinary Incontinence Screening:   Patient has not leaked urine accidently in the last six months  Home Safety:  Patient does not have trouble with stairs inside or outside of their home  Patient has working smoke alarms and has working carbon monoxide detector  Home safety hazards include: none  Nutrition:   Current diet is Regular  Medications:   Patient is not currently taking any over-the-counter supplements  Patient is able to manage medications  Activities of Daily Living (ADLs)/Instrumental Activities of Daily Living (IADLs):   Walk and transfer into and out of bed and chair?: Yes  Dress and groom yourself?: Yes    Bathe or shower yourself?: Yes    Feed yourself? Yes  Do your laundry/housekeeping?: Yes  Manage your money, pay your bills and track your expenses?: Yes  Make your own meals?: Yes    Do your own shopping?: Yes    Previous Hospitalizations:   Any hospitalizations or ED visits within the last 12 months?: No      Advance Care Planning:   Living will: Yes    Durable POA for healthcare:  Yes    Advanced directive: Yes      Cognitive Screening:   Provider or family/friend/caregiver concerned regarding cognition?: No    PREVENTIVE SCREENINGS      Cardiovascular Screening:    General: Screening Not Indicated and History Lipid Disorder      Diabetes Screening:     General: Screening Current      Colorectal Cancer Screening:     General: Screening Current      Breast Cancer Screening:     General: Screening Current      Cervical Cancer Screening:    General: Screening Not Indicated      Lung Cancer Screening:     General: Screening Not Indicated      Hepatitis C Screening:    General: Screening Current      Abdon Zee DO

## 2021-01-06 NOTE — PROGRESS NOTES
Assessment/Plan:      Diagnoses and all orders for this visit:    Costochondritis  Comments:  ibuprofen as needed, avoid aggravating motions, constider physical therapy    Medicare annual wellness visit, subsequent    Anxiety  Comments:  controlled, continue citalopram daily and buspar as needed  Orders:  -     citalopram (CeleXA) 40 mg tablet; Take 1 tablet (40 mg total) by mouth daily    Essential hypertension  Comments:  controlled, continue current medication    BMI 31 0-31 9,adult  Comments:  see bmi plan    Other orders  -     omeprazole (PriLOSEC) 40 MG capsule; Take 40 mg by mouth daily          Subjective:  Chief Complaint   Patient presents with    chest disconfort     pt states is having chest disconfort, pt said 4 months ago starting with the problem  pt said it feels more around her ribs chest area  Patient ID: Vivi Fields is a 65 y o  female  Pt here in the office with her  complaining of ongoing pain centrally over chest  Pain with certain motions, reaching  No known injury  No change with exertion  No cough  Review of Systems   Constitutional: Negative  Negative for fatigue and fever  HENT: Negative  Eyes: Negative  Respiratory: Negative  Negative for cough and shortness of breath  Cardiovascular: Positive for chest pain  Negative for palpitations and leg swelling  Gastrointestinal: Negative  Endocrine: Negative  Genitourinary: Negative  Musculoskeletal: Negative  Skin: Negative  Allergic/Immunologic: Negative  Neurological: Negative  Psychiatric/Behavioral: Negative            The following portions of the patient's history were reviewed and updated as appropriate: allergies, current medications, past family history, past medical history, past social history, past surgical history and problem list     Objective:  Vitals:    01/05/21 1247 01/05/21 1325   BP:  135/80   Pulse: 86    Temp: 98 °F (36 7 °C)    SpO2: 98%    Weight: 77 6 kg (171 lb)    Height: 5' 2" (1 575 m)       Physical Exam  Vitals signs and nursing note reviewed  Constitutional:       Appearance: She is well-developed  HENT:      Head: Normocephalic and atraumatic  Cardiovascular:      Rate and Rhythm: Normal rate and regular rhythm  Heart sounds: Normal heart sounds  Pulmonary:      Effort: Pulmonary effort is normal       Breath sounds: Normal breath sounds  Chest:       Abdominal:      General: Bowel sounds are normal       Palpations: Abdomen is soft  Musculoskeletal:         General: Tenderness present  No swelling, deformity or signs of injury  Comments: Tenderness reproducible anterior chest over costochondral junction rib 4 bilaterally   Skin:     General: Skin is warm and dry  Neurological:      Mental Status: She is alert and oriented to person, place, and time  Psychiatric:         Behavior: Behavior normal          Thought Content:  Thought content normal          Judgment: Judgment normal

## 2021-02-01 DIAGNOSIS — F41.9 ANXIETY: ICD-10-CM

## 2021-02-03 RX ORDER — BUSPIRONE HYDROCHLORIDE 10 MG/1
10 TABLET ORAL 3 TIMES DAILY
Qty: 90 TABLET | Refills: 3 | Status: SHIPPED | OUTPATIENT
Start: 2021-02-03 | End: 2021-06-02

## 2021-02-08 DIAGNOSIS — J45.20 MILD INTERMITTENT ASTHMA WITHOUT COMPLICATION: ICD-10-CM

## 2021-02-08 DIAGNOSIS — K21.9 GASTROESOPHAGEAL REFLUX DISEASE WITHOUT ESOPHAGITIS: ICD-10-CM

## 2021-02-08 DIAGNOSIS — I10 ESSENTIAL HYPERTENSION: ICD-10-CM

## 2021-02-08 RX ORDER — PANTOPRAZOLE SODIUM 40 MG/1
40 TABLET, DELAYED RELEASE ORAL
Qty: 90 TABLET | Refills: 0 | Status: SHIPPED | OUTPATIENT
Start: 2021-02-08 | End: 2021-08-02

## 2021-02-08 RX ORDER — AMLODIPINE BESYLATE 5 MG/1
5 TABLET ORAL DAILY
Qty: 90 TABLET | Refills: 0 | Status: SHIPPED | OUTPATIENT
Start: 2021-02-08 | End: 2021-05-03

## 2021-02-08 RX ORDER — ALBUTEROL SULFATE 90 UG/1
2 AEROSOL, METERED RESPIRATORY (INHALATION) EVERY 6 HOURS PRN
Qty: 18 G | Refills: 0 | Status: SHIPPED | OUTPATIENT
Start: 2021-02-08

## 2021-02-17 DIAGNOSIS — M94.0 COSTOCHONDRITIS: Primary | ICD-10-CM

## 2021-02-17 RX ORDER — MELOXICAM 15 MG/1
15 TABLET ORAL DAILY
Qty: 90 TABLET | Refills: 3 | Status: SHIPPED | OUTPATIENT
Start: 2021-02-17

## 2021-03-06 DIAGNOSIS — K21.9 GASTROESOPHAGEAL REFLUX DISEASE WITHOUT ESOPHAGITIS: Primary | ICD-10-CM

## 2021-03-06 RX ORDER — OMEPRAZOLE 40 MG/1
CAPSULE, DELAYED RELEASE ORAL
Qty: 90 CAPSULE | Refills: 0 | Status: SHIPPED | OUTPATIENT
Start: 2021-03-06 | End: 2021-06-02

## 2021-03-07 DIAGNOSIS — K21.9 GASTROESOPHAGEAL REFLUX DISEASE WITHOUT ESOPHAGITIS: ICD-10-CM

## 2021-03-08 RX ORDER — OMEPRAZOLE 40 MG/1
CAPSULE, DELAYED RELEASE ORAL
Qty: 90 CAPSULE | Refills: 0 | OUTPATIENT
Start: 2021-03-08

## 2021-03-24 DIAGNOSIS — E03.9 ACQUIRED HYPOTHYROIDISM: ICD-10-CM

## 2021-03-24 RX ORDER — LEVOTHYROXINE SODIUM 0.03 MG/1
25 TABLET ORAL DAILY
Qty: 90 TABLET | Refills: 0 | Status: SHIPPED | OUTPATIENT
Start: 2021-03-24 | End: 2021-08-31

## 2021-03-30 DIAGNOSIS — Z23 ENCOUNTER FOR IMMUNIZATION: ICD-10-CM

## 2021-05-03 DIAGNOSIS — I10 ESSENTIAL HYPERTENSION: ICD-10-CM

## 2021-05-03 RX ORDER — AMLODIPINE BESYLATE 5 MG/1
5 TABLET ORAL DAILY
Qty: 90 TABLET | Refills: 0 | Status: SHIPPED | OUTPATIENT
Start: 2021-05-03 | End: 2021-08-02

## 2021-05-06 DIAGNOSIS — I10 ESSENTIAL HYPERTENSION: ICD-10-CM

## 2021-05-07 RX ORDER — AMLODIPINE BESYLATE 5 MG/1
TABLET ORAL
Qty: 90 TABLET | Refills: 0 | OUTPATIENT
Start: 2021-05-07

## 2021-05-17 ENCOUNTER — TRANSCRIBE ORDERS (OUTPATIENT)
Dept: ADMINISTRATIVE | Facility: HOSPITAL | Age: 71
End: 2021-05-17

## 2021-05-17 ENCOUNTER — TELEPHONE (OUTPATIENT)
Dept: OTHER | Facility: OTHER | Age: 71
End: 2021-05-17

## 2021-05-17 DIAGNOSIS — Z12.31 ENCOUNTER FOR SCREENING MAMMOGRAM FOR MALIGNANT NEOPLASM OF BREAST: Primary | ICD-10-CM

## 2021-05-17 NOTE — TELEPHONE ENCOUNTER
Patient is calling because she had scheduled an appointment via central scheduling and after it was scheduled she had to make some changes and central scheduling had informed her the office would have to do it on their end  Patient did not give detail on changes that needed to be made however, she is requesting a call back  Thank you!

## 2021-05-18 ENCOUNTER — TELEPHONE (OUTPATIENT)
Dept: GASTROENTEROLOGY | Facility: CLINIC | Age: 71
End: 2021-05-18

## 2021-06-02 DIAGNOSIS — K21.9 GASTROESOPHAGEAL REFLUX DISEASE WITHOUT ESOPHAGITIS: ICD-10-CM

## 2021-06-02 DIAGNOSIS — F41.9 ANXIETY: ICD-10-CM

## 2021-06-02 RX ORDER — BUSPIRONE HYDROCHLORIDE 10 MG/1
TABLET ORAL
Qty: 90 TABLET | Refills: 3 | Status: SHIPPED | OUTPATIENT
Start: 2021-06-02 | End: 2021-10-01

## 2021-06-02 RX ORDER — OMEPRAZOLE 40 MG/1
40 CAPSULE, DELAYED RELEASE ORAL DAILY
Qty: 90 CAPSULE | Refills: 0 | Status: SHIPPED | OUTPATIENT
Start: 2021-06-02 | End: 2021-09-03

## 2021-06-05 DIAGNOSIS — K21.9 GASTROESOPHAGEAL REFLUX DISEASE WITHOUT ESOPHAGITIS: ICD-10-CM

## 2021-06-07 RX ORDER — OMEPRAZOLE 40 MG/1
CAPSULE, DELAYED RELEASE ORAL
Qty: 90 CAPSULE | Refills: 0 | OUTPATIENT
Start: 2021-06-07

## 2021-06-27 ENCOUNTER — NURSE TRIAGE (OUTPATIENT)
Dept: OTHER | Facility: OTHER | Age: 71
End: 2021-06-27

## 2021-06-27 DIAGNOSIS — S91.059A CAT BITE OF ANKLE, INITIAL ENCOUNTER: Primary | ICD-10-CM
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
## 2021-06-27 RX ORDER — AMOXICILLIN AND CLAVULANATE POTASSIUM 875; 125 MG/1; MG/1
1 TABLET, FILM COATED ORAL EVERY 12 HOURS SCHEDULED
Qty: 20 TABLET | Refills: 0 | Status: SHIPPED | OUTPATIENT
Start: 2021-06-27 | End: 2021-07-07

## 2021-06-27 NOTE — TELEPHONE ENCOUNTER
[facility] sent to on call provider, Dr Gerlad Britt, regarding patient's cat bite and symptoms of warmth, pain, swelling, and redness on the knuckle  Per Dr Gerald Britt, RN to place an order for Augmentin 875/125 one tablet twice daily for 10 days  Made patient aware, placed order and confirmed pharmacy and medication instructions  Patient verbalized understanding

## 2021-06-27 NOTE — TELEPHONE ENCOUNTER
Reason for Disposition   [1] Puncture wound (hole through the skin) AND [2] from a cat bite (or deep claw puncture wound)    Answer Assessment - Initial Assessment Questions  1  ANIMAL: "What type of animal caused the bite?" "Is the injury from a bite or a claw?" If the animal is a dog or a cat, ask: "Was it a pet or a stray?" "Was it acting ill or behaving strangely?"      Pet cat  Cat is up to date on shots and vaccines  2  LOCATION: "Where is the bite located?"       On her hand, by the knuckle    3  SIZE: "How big is the bite?" "What does it look like?"       Just a small tooth bite  4  ONSET: "When did the bite happen?" (Minutes or hours ago)       Yesterday around noon     5  CIRCUMSTANCES: "Tell me how this happened "       Patient reports cat was outside and it started to rain so patient went to bring cat inside and she bit her    6  TETANUS: "When was the last tetanus booster?"      Unsure  In Epic was listed as 2010    Swollen, painful, warm to the touch, inability to bend the hand  2 by 3-4 inches      Protocols used: ANIMAL BITE-ADULT-

## 2021-06-27 NOTE — TELEPHONE ENCOUNTER
Regarding: Was bit by cat/ requesting antibiotics  ----- Message from Rhina Yuen sent at 6/27/2021 12:04 PM EDT -----  "My cat bit the top part of my hand yesterday   I'd like to know if I should be on an antibiotic because my hand is swollen and red "
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
## 2021-06-29 PROBLEM — S61.459A CAT BITE OF HAND: Status: ACTIVE | Noted: 2021-06-29

## 2021-08-10 ENCOUNTER — TELEPHONE (OUTPATIENT)
Dept: FAMILY MEDICINE CLINIC | Facility: CLINIC | Age: 71
End: 2021-08-10

## 2021-08-10 NOTE — TELEPHONE ENCOUNTER
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

## 2021-08-10 NOTE — TELEPHONE ENCOUNTER
Can she schedule an office visit so I can see her throat  Tmj would check with the dentist about her bite or grinding or clenching

## 2021-08-31 DIAGNOSIS — E03.9 ACQUIRED HYPOTHYROIDISM: ICD-10-CM

## 2021-08-31 RX ORDER — LEVOTHYROXINE SODIUM 0.03 MG/1
TABLET ORAL
Qty: 90 TABLET | Refills: 0 | Status: SHIPPED | OUTPATIENT
Start: 2021-08-31 | End: 2021-11-03

## 2021-09-03 DIAGNOSIS — K21.9 GASTROESOPHAGEAL REFLUX DISEASE WITHOUT ESOPHAGITIS: ICD-10-CM

## 2021-09-03 RX ORDER — OMEPRAZOLE 40 MG/1
CAPSULE, DELAYED RELEASE ORAL
Qty: 90 CAPSULE | Refills: 0 | Status: SHIPPED | OUTPATIENT
Start: 2021-09-03 | End: 2021-11-30

## 2021-11-02 DIAGNOSIS — I10 ESSENTIAL HYPERTENSION: ICD-10-CM

## 2021-11-02 DIAGNOSIS — F41.9 ANXIETY: ICD-10-CM

## 2021-11-02 DIAGNOSIS — K21.9 GASTROESOPHAGEAL REFLUX DISEASE WITHOUT ESOPHAGITIS: ICD-10-CM

## 2021-11-02 DIAGNOSIS — E03.9 ACQUIRED HYPOTHYROIDISM: ICD-10-CM

## 2021-11-02 RX ORDER — BUSPIRONE HYDROCHLORIDE 10 MG/1
TABLET ORAL
Qty: 30 TABLET | Refills: 0 | Status: SHIPPED | OUTPATIENT
Start: 2021-11-02 | End: 2021-11-30

## 2021-11-02 RX ORDER — PANTOPRAZOLE SODIUM 40 MG/1
TABLET, DELAYED RELEASE ORAL
Qty: 90 TABLET | Refills: 0 | Status: SHIPPED | OUTPATIENT
Start: 2021-11-02 | End: 2022-01-28

## 2021-11-02 RX ORDER — AMLODIPINE BESYLATE 5 MG/1
TABLET ORAL
Qty: 90 TABLET | Refills: 0 | Status: SHIPPED | OUTPATIENT
Start: 2021-11-02 | End: 2022-01-28

## 2021-11-03 RX ORDER — LEVOTHYROXINE SODIUM 0.03 MG/1
TABLET ORAL
Qty: 90 TABLET | Refills: 0 | Status: SHIPPED | OUTPATIENT
Start: 2021-11-03 | End: 2022-03-06

## 2021-11-08 DIAGNOSIS — E03.9 ACQUIRED HYPOTHYROIDISM: Primary | ICD-10-CM

## 2021-11-08 DIAGNOSIS — E66.9 OBESITY (BMI 30.0-34.9): ICD-10-CM

## 2021-11-08 DIAGNOSIS — I10 ESSENTIAL HYPERTENSION: ICD-10-CM

## 2022-01-14 LAB
ALBUMIN SERPL-MCNC: 4.5 G/DL (ref 3.7–4.7)
ALBUMIN/GLOB SERPL: 2 {RATIO} (ref 1.2–2.2)
ALP SERPL-CCNC: 52 IU/L (ref 44–121)
ALT SERPL-CCNC: 20 IU/L (ref 0–32)
AST SERPL-CCNC: 23 IU/L (ref 0–40)
BASOPHILS # BLD AUTO: 0 X10E3/UL (ref 0–0.2)
BASOPHILS NFR BLD AUTO: 0 %
BILIRUB SERPL-MCNC: 0.5 MG/DL (ref 0–1.2)
BUN SERPL-MCNC: 11 MG/DL (ref 8–27)
BUN/CREAT SERPL: 15 (ref 12–28)
CALCIUM SERPL-MCNC: 9.3 MG/DL (ref 8.7–10.3)
CHLORIDE SERPL-SCNC: 101 MMOL/L (ref 96–106)
CHOLEST SERPL-MCNC: 204 MG/DL (ref 100–199)
CO2 SERPL-SCNC: 26 MMOL/L (ref 20–29)
CREAT SERPL-MCNC: 0.73 MG/DL (ref 0.57–1)
EOSINOPHIL # BLD AUTO: 0.3 X10E3/UL (ref 0–0.4)
EOSINOPHIL NFR BLD AUTO: 3 %
ERYTHROCYTE [DISTWIDTH] IN BLOOD BY AUTOMATED COUNT: 12.5 % (ref 11.7–15.4)
GLOBULIN SER-MCNC: 2.2 G/DL (ref 1.5–4.5)
GLUCOSE SERPL-MCNC: 105 MG/DL (ref 65–99)
HCT VFR BLD AUTO: 38.8 % (ref 34–46.6)
HDLC SERPL-MCNC: 59 MG/DL
HGB BLD-MCNC: 12.7 G/DL (ref 11.1–15.9)
IMM GRANULOCYTES # BLD: 0 X10E3/UL (ref 0–0.1)
IMM GRANULOCYTES NFR BLD: 0 %
LDLC SERPL CALC-MCNC: 128 MG/DL (ref 0–99)
LDLC/HDLC SERPL: 2.2 RATIO (ref 0–3.2)
LYMPHOCYTES # BLD AUTO: 2.2 X10E3/UL (ref 0.7–3.1)
LYMPHOCYTES NFR BLD AUTO: 26 %
MCH RBC QN AUTO: 30.1 PG (ref 26.6–33)
MCHC RBC AUTO-ENTMCNC: 32.7 G/DL (ref 31.5–35.7)
MCV RBC AUTO: 92 FL (ref 79–97)
MONOCYTES # BLD AUTO: 1.1 X10E3/UL (ref 0.1–0.9)
MONOCYTES NFR BLD AUTO: 12 %
NEUTROPHILS # BLD AUTO: 5.1 X10E3/UL (ref 1.4–7)
NEUTROPHILS NFR BLD AUTO: 59 %
PLATELET # BLD AUTO: 265 X10E3/UL (ref 150–450)
POTASSIUM SERPL-SCNC: 3.8 MMOL/L (ref 3.5–5.2)
PROT SERPL-MCNC: 6.7 G/DL (ref 6–8.5)
RBC # BLD AUTO: 4.22 X10E6/UL (ref 3.77–5.28)
SL AMB EGFR AFRICAN AMERICAN: 94 ML/MIN/1.73
SL AMB EGFR NON AFRICAN AMERICAN: 82 ML/MIN/1.73
SL AMB VLDL CHOLESTEROL CALC: 17 MG/DL (ref 5–40)
SODIUM SERPL-SCNC: 140 MMOL/L (ref 134–144)
TRIGL SERPL-MCNC: 93 MG/DL (ref 0–149)
TSH SERPL DL<=0.005 MIU/L-ACNC: 2.98 UIU/ML (ref 0.45–4.5)
WBC # BLD AUTO: 8.8 X10E3/UL (ref 3.4–10.8)

## 2022-01-17 ENCOUNTER — TELEPHONE (OUTPATIENT)
Dept: FAMILY MEDICINE CLINIC | Facility: CLINIC | Age: 72
End: 2022-01-17

## 2022-01-17 NOTE — TELEPHONE ENCOUNTER
----- Message from Albert Figueroa DO sent at 1/17/2022 12:22 PM EST -----  Your cholesterol is still a little high but a little better than last year  All other labs are good

## 2022-01-28 DIAGNOSIS — K21.9 GASTROESOPHAGEAL REFLUX DISEASE WITHOUT ESOPHAGITIS: ICD-10-CM

## 2022-01-28 DIAGNOSIS — I10 ESSENTIAL HYPERTENSION: ICD-10-CM

## 2022-01-28 RX ORDER — PANTOPRAZOLE SODIUM 40 MG/1
TABLET, DELAYED RELEASE ORAL
Qty: 90 TABLET | Refills: 0 | Status: SHIPPED | OUTPATIENT
Start: 2022-01-28

## 2022-01-28 RX ORDER — AMLODIPINE BESYLATE 5 MG/1
TABLET ORAL
Qty: 90 TABLET | Refills: 0 | Status: SHIPPED | OUTPATIENT
Start: 2022-01-28 | End: 2022-06-16 | Stop reason: SDUPTHER

## 2022-03-17 ENCOUNTER — OFFICE VISIT (OUTPATIENT)
Dept: FAMILY MEDICINE CLINIC | Facility: HOSPITAL | Age: 72
End: 2022-03-17
Payer: COMMERCIAL

## 2022-03-17 VITALS
BODY MASS INDEX: 32.2 KG/M2 | DIASTOLIC BLOOD PRESSURE: 82 MMHG | TEMPERATURE: 97.7 F | WEIGHT: 175 LBS | SYSTOLIC BLOOD PRESSURE: 148 MMHG | HEART RATE: 71 BPM | HEIGHT: 62 IN

## 2022-03-17 DIAGNOSIS — M26.621 ARTHRALGIA OF RIGHT TEMPOROMANDIBULAR JOINT: ICD-10-CM

## 2022-03-17 DIAGNOSIS — Z00.00 MEDICARE ANNUAL WELLNESS VISIT, SUBSEQUENT: Primary | ICD-10-CM

## 2022-03-17 DIAGNOSIS — R14.2 BURPING: ICD-10-CM

## 2022-03-17 DIAGNOSIS — Z00.00 MEDICARE ANNUAL WELLNESS VISIT, INITIAL: ICD-10-CM

## 2022-03-17 DIAGNOSIS — J45.909 MILD ASTHMA WITHOUT COMPLICATION, UNSPECIFIED WHETHER PERSISTENT: ICD-10-CM

## 2022-03-17 DIAGNOSIS — M79.644 FINGER PAIN, RIGHT: ICD-10-CM

## 2022-03-17 DIAGNOSIS — Z12.11 SCREEN FOR COLON CANCER: ICD-10-CM

## 2022-03-17 DIAGNOSIS — I10 PRIMARY HYPERTENSION: ICD-10-CM

## 2022-03-17 DIAGNOSIS — Z12.31 ENCOUNTER FOR SCREENING MAMMOGRAM FOR MALIGNANT NEOPLASM OF BREAST: ICD-10-CM

## 2022-03-17 DIAGNOSIS — M65.321 TRIGGER FINGER, RIGHT INDEX FINGER: ICD-10-CM

## 2022-03-17 DIAGNOSIS — E78.5 HYPERLIPIDEMIA, UNSPECIFIED HYPERLIPIDEMIA TYPE: ICD-10-CM

## 2022-03-17 DIAGNOSIS — M94.0 COSTOCHONDRITIS: ICD-10-CM

## 2022-03-17 DIAGNOSIS — K21.9 GASTROESOPHAGEAL REFLUX DISEASE WITHOUT ESOPHAGITIS: ICD-10-CM

## 2022-03-17 PROCEDURE — 1036F TOBACCO NON-USER: CPT | Performed by: FAMILY MEDICINE

## 2022-03-17 PROCEDURE — 99214 OFFICE O/P EST MOD 30 MIN: CPT | Performed by: FAMILY MEDICINE

## 2022-03-17 PROCEDURE — 3725F SCREEN DEPRESSION PERFORMED: CPT | Performed by: FAMILY MEDICINE

## 2022-03-17 PROCEDURE — 3008F BODY MASS INDEX DOCD: CPT | Performed by: FAMILY MEDICINE

## 2022-03-17 PROCEDURE — 1170F FXNL STATUS ASSESSED: CPT | Performed by: FAMILY MEDICINE

## 2022-03-17 PROCEDURE — 1160F RVW MEDS BY RX/DR IN RCRD: CPT | Performed by: FAMILY MEDICINE

## 2022-03-17 PROCEDURE — G0439 PPPS, SUBSEQ VISIT: HCPCS | Performed by: FAMILY MEDICINE

## 2022-03-17 PROCEDURE — 3288F FALL RISK ASSESSMENT DOCD: CPT | Performed by: FAMILY MEDICINE

## 2022-03-17 PROCEDURE — 1125F AMNT PAIN NOTED PAIN PRSNT: CPT | Performed by: FAMILY MEDICINE

## 2022-03-17 RX ORDER — YOHIMBE BARK 500 MG
CAPSULE ORAL
COMMUNITY

## 2022-03-17 RX ORDER — ATORVASTATIN CALCIUM 10 MG/1
10 TABLET, FILM COATED ORAL DAILY
Qty: 30 TABLET | Refills: 2 | Status: SHIPPED | OUTPATIENT
Start: 2022-03-17 | End: 2022-03-30

## 2022-03-17 NOTE — PROGRESS NOTES
Assessment and Plan:     Problem List Items Addressed This Visit        Other    Hyperlipidemia    Relevant Medications    atorvastatin (LIPITOR) 10 mg tablet    Other Relevant Orders    Comprehensive metabolic panel    Lipid Panel with Direct LDL reflex    Medicare annual wellness visit, subsequent - Primary      Other Visit Diagnoses     Encounter for screening mammogram for malignant neoplasm of breast        Relevant Orders    Mammo screening bilateral w 3d & cad    Screen for colon cancer        Relevant Orders    Ambulatory referral for colonoscopy    Finger pain, right        Arthralgia of right temporomandibular joint        Trigger finger, right index finger        Mild asthma without complication, unspecified whether persistent        Relevant Medications    fluticasone-salmeterol (Advair Diskus) 250-50 mcg/dose inhaler    Medicare annual wellness visit, initial               Preventive health issues were discussed with patient, and age appropriate screening tests were ordered as noted in patient's After Visit Summary  Personalized health advice and appropriate referrals for health education or preventive services given if needed, as noted in patient's After Visit Summary       History of Present Illness:     Patient presents for Medicare Annual Wellness visit    Patient Care Team:  Mamadou Dolan MD as PCP - General (Family Medicine)  DO Natasha Zarate DO Warner Hurst, MD (Family Medicine)  Mamadou Dolan MD (Family Medicine)     Problem List:     Patient Active Problem List   Diagnosis    Abnormal mammogram    Anxiety    Hiatal hernia    Hyperlipidemia    Hypothyroidism    Impaired fasting glucose    Obstructive sleep apnea    Raynaud's syndrome without gangrene    Reactive airway disease    Burping    Chronic fatigue    Rib sprain, initial encounter    Hypertension    Right wrist tendonitis    BMI 31 0-31 9,adult    Trigger finger, left ring finger    Strain of left trapezius muscle    Obesity (BMI 30 0-34  9)    Gastroesophageal reflux disease    Oropharyngeal dysphagia    Muscle spasm    Atypical chest pain    Seborrheic keratosis    Nondisplaced fracture of right ulna    Asymmetrical hearing loss of both ears    Neck mass    Costochondritis    Medicare annual wellness visit, subsequent    Cat bite of hand      Past Medical and Surgical History:     Past Medical History:   Diagnosis Date    Anxiety     Asthma     Disease of thyroid gland     GERD (gastroesophageal reflux disease)     Hearing loss     Heart murmur     Migraine     Peripheral vertigo     Raynaud disease     S/P trigger finger release     Sleep apnea     Visual disturbance      Past Surgical History:   Procedure Laterality Date    CATARACT EXTRACTION      COLON SURGERY      EYE SURGERY  2019    GLAUCOMA SURGERY      HALLUX VALGUS CORRECTION Right 06/19/2007    INCISION TENDON SHEATH HAND  05/23/2011    Release of A1 pulley    KNEE ARTHROSCOPY W/ MENISCAL REPAIR Left 2003    KNEE SURGERY  2005    SC ESOPHAGOGASTRODUODENOSCOPY TRANSORAL DIAGNOSTIC N/A 8/22/2018    Procedure: ESOPHAGOGASTRODUODENOSCOPY (EGD);   Surgeon: Juan Carlos Toribio MD;  Location: [facility];  Service: Gastroenterology    SC INCISE FINGER TENDON SHEATH Left 7/11/2019    Procedure: RELEASE TRIGGER FINGER left ring;  Surgeon: Isaac Hicks MD;  Location: [facility];  Service: Orthopedics    TRIGGER FINGER RELEASE      TUBAL LIGATION  1977    UPPER GASTROINTESTINAL ENDOSCOPY        Family History:     Family History   Problem Relation Age of Onset    Lung cancer Mother     Cancer Mother         80 Lung    Stomach cancer Father     Cancer Father         1998 Stomach    Other Maternal Grandmother         Stroke syndrome    Other Maternal Aunt         Cardiomegaly    No Known Problems Sister     No Known Problems Daughter     No Known Problems Maternal Grandfather     No Known Problems Paternal Grandmother     No Known Problems Paternal Grandfather     No Known Problems Paternal Aunt     No Known Problems Paternal Aunt     No Known Problems Paternal Aunt       Social History:     Social History     Socioeconomic History    Marital status: /Civil Union     Spouse name: None    Number of children: None    Years of education: None    Highest education level: None   Occupational History    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)   Tobacco Use    Smoking status: Never Smoker    Smokeless tobacco: Never Used   Vaping Use    Vaping Use: Never used   Substance and Sexual Activity    Alcohol use:  Yes     Alcohol/week: 5 0 standard drinks     Types: 5 Cans of beer per week    Drug use: No    Sexual activity: None   Other Topics Concern    None   Social History Narrative    Daily coffee consumption (1 cup/day)    Patient has living will    Uses safety equipment - seatbelts     Social Determinants of Health     Financial Resource Strain: Not on file   Food Insecurity: Not on file   Transportation Needs: Not on file   Physical Activity: Not on file   Stress: Not on file   Social Connections: Not on file   Intimate Partner Violence: Not on file   Housing Stability: Not on file      Medications and Allergies:     Current Outpatient Medications   Medication Sig Dispense Refill    albuterol (2 5 mg/3 mL) 0 083 % nebulizer solution Take 1 vial (2 5 mg total) by nebulization every 6 (six) hours as needed for wheezing or shortness of breath 45 vial 0    albuterol (ProAir HFA) 90 mcg/act inhaler Inhale 2 puffs every 6 (six) hours as needed for wheezing 18 g 0    amLODIPine (NORVASC) 5 mg tablet TAKE ONE TABLET BY MOUTH EVERY DAY 90 tablet 0    aspirin 81 mg chewable tablet Chew 1 tablet daily      busPIRone (BUSPAR) 10 mg tablet TAKE ONE TABLET BY MOUTH THREE TIMES A DAY 90 tablet 0    citalopram (CeleXA) 40 mg tablet TAKE ONE TABLET BY MOUTH EVERY DAY 30 tablet 0    diphenoxylate-atropine (LOMOTIL) 2 5-0 025 mg per tablet Take 1 tablet by mouth daily as needed      Lactobacillus (Acidophilus) 100 MG CAPS Take by mouth      levothyroxine 25 mcg tablet TAKE ONE TABLET BY MOUTH EVERY DAY 90 tablet 0    meloxicam (MOBIC) 15 mg tablet Take 1 tablet (15 mg total) by mouth daily 90 tablet 3    Multiple Vitamin (MULTIVITAMIN) tablet Take 1 tablet by mouth daily      omeprazole (PriLOSEC) 40 MG capsule TAKE ONE CAPSULE BY MOUTH EVERY DAY 90 capsule 0    pantoprazole (PROTONIX) 40 mg tablet TAKE ONE TABLET BY MOUTH EVERY DAY 90 tablet 0    silver sulfadiazine (SILVADENE,SSD) 1 % cream Apply topically daily 50 g 0    atorvastatin (LIPITOR) 10 mg tablet Take 1 tablet (10 mg total) by mouth daily 30 tablet 2    fluticasone-salmeterol (Advair Diskus) 250-50 mcg/dose inhaler Inhale 1 puff 2 (two) times a day Rinse mouth after use  180 blister 0     No current facility-administered medications for this visit  Allergies   Allergen Reactions    Pneumococcal Polysaccharide Vaccine Fever     Other reaction(s): Chills, fever and mottling  Category:  Allergy;     Corrie Allergic Rhinitis, Headache, Nasal Congestion and Visual Disturbance      Immunizations:     Immunization History   Administered Date(s) Administered    COVID-19 MODERNA VACC 0 5 ML IM 03/24/2021, 04/21/2021    COVID-19 PFIZER VACCINE 0 3 ML IM 10/29/2021    H1N1, All Formulations 11/03/2009    Hep B, adult 01/07/1999, 02/09/1999, 08/10/1999    INFLUENZA 10/17/2018, 10/24/2019    Influenza Injectable, MDCK, Preservative Free, Quadrivalent, 0 5 mL 10/24/2019    Influenza Split High Dose Preservative Free IM 10/27/2015, 09/14/2016    Influenza, high dose seasonal 0 7 mL 09/02/2020    Influenza, seasonal, injectable 1950, 10/15/2014    Pneumococcal Polysaccharide PPV23 01/01/2000    Tdap 01/01/2010, 10/12/2010    Zoster 02/02/2015      Health Maintenance:         Topic Date Due    Breast Cancer Screening: Mammogram  06/12/2021    Colorectal Cancer Screening  06/13/2022    Hepatitis C Screening  Completed         Topic Date Due    Pneumococcal Vaccine: 65+ Years (2 of 2 - PPSV23) 08/24/2013    DTaP,Tdap,and Td Vaccines (3 - Td or Tdap) 10/12/2020    Influenza Vaccine (1) 09/01/2021      Medicare Health Risk Assessment:     /82   Pulse 71   Temp 97 7 °F (36 5 °C)   Ht 5' 1 5" (1 562 m)   Wt 79 4 kg (175 lb)   BMI 32 53 kg/m²      Columba Madden is here for her Subsequent Wellness visit  Health Risk Assessment:   Patient rates overall health as very good  Patient feels that their physical health rating is same  Patient is very satisfied with their life  Eyesight was rated as same  Hearing was rated as same  Patient feels that their emotional and mental health rating is same  Patients states they are never, rarely angry  Patient states they are never, rarely unusually tired/fatigued  Pain experienced in the last 7 days has been some  Patient's pain rating has been 2/10  Patient states that she has experienced no weight loss or gain in last 6 months  Depression Screening:   PHQ-2 Score: 0      Fall Risk Screening: In the past year, patient has experienced: no history of falling in past year      Urinary Incontinence Screening:   Patient has not leaked urine accidently in the last six months  Home Safety:  Patient does not have trouble with stairs inside or outside of their home  Patient has working smoke alarms and has working carbon monoxide detector  Home safety hazards include: none  Nutrition:   Current diet is Regular  Medications:   Patient is currently taking over-the-counter supplements  OTC medications include: see medication list  Patient is able to manage medications       Activities of Daily Living (ADLs)/Instrumental Activities of Daily Living (IADLs):   Walk and transfer into and out of bed and chair?: Yes  Dress and groom yourself?: Yes    Bathe or shower yourself?: Yes    Feed yourself? Yes  Do your laundry/housekeeping?: Yes  Manage your money, pay your bills and track your expenses?: Yes  Make your own meals?: Yes    Do your own shopping?: Yes    Previous Hospitalizations:   Any hospitalizations or ED visits within the last 12 months?: No      Advance Care Planning:   Living will: Yes    Durable POA for healthcare: Yes    Advanced directive: Yes    Advanced directive counseling given: Yes      Cognitive Screening:   Provider or family/friend/caregiver concerned regarding cognition?: Yes    PREVENTIVE SCREENINGS      Cardiovascular Screening:    General: Screening Not Indicated and History Lipid Disorder      Diabetes Screening:     General: Screening Current      Colorectal Cancer Screening:     General: Screening Current      Breast Cancer Screening:     General: Screening Current      Cervical Cancer Screening:    General: Screening Not Indicated      Abdominal Aortic Aneurysm (AAA) Screening:        General: Screening Not Indicated      Lung Cancer Screening:     General: Screening Not Indicated      Hepatitis C Screening:    General: Screening Current    Screening, Brief Intervention, and Referral to Treatment (SBIRT)    Screening  Typical number of drinks in a day: 1  Typical number of drinks in a week: 5  Interpretation: Low risk drinking behavior      Single Item Drug Screening:  How often have you used an illegal drug (including marijuana) or a prescription medication for non-medical reasons in the past year? never    Single Item Drug Screen Score: 0  Interpretation: Negative screen for possible drug use disorder      Luis Fernando Serrano MD

## 2022-03-17 NOTE — PATIENT INSTRUCTIONS
Medicare Preventive Visit Patient Instructions  Thank you for completing your Welcome to Medicare Visit or Medicare Annual Wellness Visit today  Your next wellness visit will be due in one year (3/18/2023)  The screening/preventive services that you may require over the next 5-10 years are detailed below  Some tests may not apply to you based off risk factors and/or age  Screening tests ordered at today's visit but not completed yet may show as past due  Also, please note that scanned in results may not display below  Preventive Screenings:  Service Recommendations Previous Testing/Comments   Colorectal Cancer Screening  * Colonoscopy    * Fecal Occult Blood Test (FOBT)/Fecal Immunochemical Test (FIT)  * Fecal DNA/Cologuard Test  * Flexible Sigmoidoscopy Age: 54-65 years old   Colonoscopy: every 10 years (may be performed more frequently if at higher risk)  OR  FOBT/FIT: every 1 year  OR  Cologuard: every 3 years  OR  Sigmoidoscopy: every 5 years  Screening may be recommended earlier than age 48 if at higher risk for colorectal cancer  Also, an individualized decision between you and your healthcare provider will decide whether screening between the ages of 74-80 would be appropriate  Colonoscopy: 06/13/2012  FOBT/FIT: Not on file  Cologuard: Not on file  Sigmoidoscopy: Not on file    Screening Current     Breast Cancer Screening Age: 36 years old  Frequency: every 1-2 years  Not required if history of left and right mastectomy Mammogram: 06/12/2020    Screening Current   Cervical Cancer Screening Between the ages of 21-29, pap smear recommended once every 3 years  Between the ages of 33-67, can perform pap smear with HPV co-testing every 5 years     Recommendations may differ for women with a history of total hysterectomy, cervical cancer, or abnormal pap smears in past  Pap Smear: Not on file    Screening Not Indicated   Hepatitis C Screening Once for adults born between 1945 and 1965  More frequently in patients at high risk for Hepatitis C Hep C Antibody: 09/07/2018    Screening Current   Diabetes Screening 1-2 times per year if you're at risk for diabetes or have pre-diabetes Fasting glucose: No results in last 5 years   A1C: 6 0 %    Screening Current   Cholesterol Screening Once every 5 years if you don't have a lipid disorder  May order more often based on risk factors  Lipid panel: 01/14/2022    Screening Not Indicated  History Lipid Disorder     Other Preventive Screenings Covered by Medicare:  1  Abdominal Aortic Aneurysm (AAA) Screening: covered once if your at risk  You're considered to be at risk if you have a family history of AAA  2  Lung Cancer Screening: covers low dose CT scan once per year if you meet all of the following conditions: (1) Age 50-69; (2) No signs or symptoms of lung cancer; (3) Current smoker or have quit smoking within the last 15 years; (4) You have a tobacco smoking history of at least 30 pack years (packs per day multiplied by number of years you smoked); (5) You get a written order from a healthcare provider  3  Glaucoma Screening: covered annually if you're considered high risk: (1) You have diabetes OR (2) Family history of glaucoma OR (3)  aged 48 and older OR (3)  American aged 72 and older  3  Osteoporosis Screening: covered every 2 years if you meet one of the following conditions: (1) You're estrogen deficient and at risk for osteoporosis based off medical history and other findings; (2) Have a vertebral abnormality; (3) On glucocorticoid therapy for more than 3 months; (4) Have primary hyperparathyroidism; (5) On osteoporosis medications and need to assess response to drug therapy  · Last bone density test (DXA Scan): 11/05/2013  5  HIV Screening: covered annually if you're between the age of 12-76  Also covered annually if you are younger than 13 and older than 72 with risk factors for HIV infection   For pregnant patients, it is covered up to 3 times per pregnancy  Immunizations:  Immunization Recommendations   Influenza Vaccine Annual influenza vaccination during flu season is recommended for all persons aged >= 6 months who do not have contraindications   Pneumococcal Vaccine (Prevnar and Pneumovax)  * Prevnar = PCV13  * Pneumovax = PPSV23   Adults 25-60 years old: 1-3 doses may be recommended based on certain risk factors  Adults 72 years old: Prevnar (PCV13) vaccine recommended followed by Pneumovax (PPSV23) vaccine  If already received PPSV23 since turning 65, then PCV13 recommended at least one year after PPSV23 dose  Hepatitis B Vaccine 3 dose series if at intermediate or high risk (ex: diabetes, end stage renal disease, liver disease)   Tetanus (Td) Vaccine - COST NOT COVERED BY MEDICARE PART B Following completion of primary series, a booster dose should be given every 10 years to maintain immunity against tetanus  Td may also be given as tetanus wound prophylaxis  Tdap Vaccine - COST NOT COVERED BY MEDICARE PART B Recommended at least once for all adults  For pregnant patients, recommended with each pregnancy  Shingles Vaccine (Shingrix) - COST NOT COVERED BY MEDICARE PART B  2 shot series recommended in those aged 48 and above     Health Maintenance Due:      Topic Date Due    Breast Cancer Screening: Mammogram  06/12/2021    Colorectal Cancer Screening  06/13/2022    Hepatitis C Screening  Completed     Immunizations Due:      Topic Date Due    Pneumococcal Vaccine: 65+ Years (1 of 1 - PPSV23) 08/24/2013    DTaP,Tdap,and Td Vaccines (3 - Td or Tdap) 10/12/2020    Influenza Vaccine (1) 09/01/2021     Advance Directives   What are advance directives? Advance directives are legal documents that state your wishes and plans for medical care  These plans are made ahead of time in case you lose your ability to make decisions for yourself   Advance directives can apply to any medical decision, such as the treatments you want, and if you want to donate organs  What are the types of advance directives? There are many types of advance directives, and each state has rules about how to use them  You may choose a combination of any of the following:  · Living will: This is a written record of the treatment you want  You can also choose which treatments you do not want, which to limit, and which to stop at a certain time  This includes surgery, medicine, IV fluid, and tube feedings  · Durable power of  for healthcare [facility]): This is a written record that states who you want to make healthcare choices for you when you are unable to make them for yourself  This person, called a proxy, is usually a family member or a friend  You may choose more than 1 proxy  · Do not resuscitate (DNR) order:  A DNR order is used in case your heart stops beating or you stop breathing  It is a request not to have certain forms of treatment, such as CPR  A DNR order may be included in other types of advance directives  · Medical directive: This covers the care that you want if you are in a coma, near death, or unable to make decisions for yourself  You can list the treatments you want for each condition  Treatment may include pain medicine, surgery, blood transfusions, dialysis, IV or tube feedings, and a ventilator (breathing machine)  · Values history: This document has questions about your views, beliefs, and how you feel and think about life  This information can help others choose the care that you would choose  Why are advance directives important? An advance directive helps you control your care  Although spoken wishes may be used, it is better to have your wishes written down  Spoken wishes can be misunderstood, or not followed  Treatments may be given even if you do not want them  An advance directive may make it easier for your family to make difficult choices about your care     Weight Management   Why it is important to manage your weight:  Being overweight increases your risk of health conditions such as heart disease, high blood pressure, type 2 diabetes, and certain types of cancer  It can also increase your risk for osteoarthritis, sleep apnea, and other respiratory problems  Aim for a slow, steady weight loss  Even a small amount of weight loss can lower your risk of health problems  How to lose weight safely:  A safe and healthy way to lose weight is to eat fewer calories and get regular exercise  You can lose up about 1 pound a week by decreasing the number of calories you eat by 500 calories each day  Healthy meal plan for weight management:  A healthy meal plan includes a variety of foods, contains fewer calories, and helps you stay healthy  A healthy meal plan includes the following:  · Eat whole-grain foods more often  A healthy meal plan should contain fiber  Fiber is the part of grains, fruits, and vegetables that is not broken down by your body  Whole-grain foods are healthy and provide extra fiber in your diet  Some examples of whole-grain foods are whole-wheat breads and pastas, oatmeal, brown rice, and bulgur  · Eat a variety of vegetables every day  Include dark, leafy greens such as spinach, kale, luis greens, and mustard greens  Eat yellow and orange vegetables such as carrots, sweet potatoes, and winter squash  · Eat a variety of fruits every day  Choose fresh or canned fruit (canned in its own juice or light syrup) instead of juice  Fruit juice has very little or no fiber  · Eat low-fat dairy foods  Drink fat-free (skim) milk or 1% milk  Eat fat-free yogurt and low-fat cottage cheese  Try low-fat cheeses such as mozzarella and other reduced-fat cheeses  · Choose meat and other protein foods that are low in fat  Choose beans or other legumes such as split peas or lentils  Choose fish, skinless poultry (chicken or turkey), or lean cuts of red meat (beef or pork)   Before you cook meat or poultry, cut off any visible fat  · Use less fat and oil  Try baking foods instead of frying them  Add less fat, such as margarine, sour cream, regular salad dressing and mayonnaise to foods  Eat fewer high-fat foods  Some examples of high-fat foods include french fries, doughnuts, ice cream, and cakes  · Eat fewer sweets  Limit foods and drinks that are high in sugar  This includes candy, cookies, regular soda, and sweetened drinks  Exercise:  Exercise at least 30 minutes per day on most days of the week  Some examples of exercise include walking, biking, dancing, and swimming  You can also fit in more physical activity by taking the stairs instead of the elevator or parking farther away from stores  Ask your healthcare provider about the best exercise plan for you  © Copyright [employer] 2018 Information is for End User's use only and may not be sold, redistributed or otherwise used for commercial purposes  All illustrations and images included in CareNotes® are the copyrighted property of [employer]  or [facility] Preventive Visit Patient Instructions  Thank you for completing your Welcome to Medicare Visit or Medicare Annual Wellness Visit today  Your next wellness visit will be due in one year (3/22/2023)  The screening/preventive services that you may require over the next 5-10 years are detailed below  Some tests may not apply to you based off risk factors and/or age  Screening tests ordered at today's visit but not completed yet may show as past due  Also, please note that scanned in results may not display below    Preventive Screenings:  Service Recommendations Previous Testing/Comments   Colorectal Cancer Screening  * Colonoscopy    * Fecal Occult Blood Test (FOBT)/Fecal Immunochemical Test (FIT)  * Fecal DNA/Cologuard Test  * Flexible Sigmoidoscopy Age: 54-65 years old   Colonoscopy: every 10 years (may be performed more frequently if at higher risk)  OR  FOBT/FIT: every 1 year OR  Cologuard: every 3 years  OR  Sigmoidoscopy: every 5 years  Screening may be recommended earlier than age 48 if at higher risk for colorectal cancer  Also, an individualized decision between you and your healthcare provider will decide whether screening between the ages of 74-80 would be appropriate  Colonoscopy: 06/13/2012  FOBT/FIT: Not on file  Cologuard: Not on file  Sigmoidoscopy: Not on file    Screening Current     Breast Cancer Screening Age: 36 years old  Frequency: every 1-2 years  Not required if history of left and right mastectomy Mammogram: 06/12/2020    Screening Current   Cervical Cancer Screening Between the ages of 21-29, pap smear recommended once every 3 years  Between the ages of 33-67, can perform pap smear with HPV co-testing every 5 years  Recommendations may differ for women with a history of total hysterectomy, cervical cancer, or abnormal pap smears in past  Pap Smear: Not on file    Screening Not Indicated   Hepatitis C Screening Once for adults born between 1945 and 1965  More frequently in patients at high risk for Hepatitis C Hep C Antibody: 09/07/2018    Screening Current   Diabetes Screening 1-2 times per year if you're at risk for diabetes or have pre-diabetes Fasting glucose: No results in last 5 years   A1C: 6 0 %    Screening Current   Cholesterol Screening Once every 5 years if you don't have a lipid disorder  May order more often based on risk factors  Lipid panel: 01/14/2022    Screening Not Indicated  History Lipid Disorder     Other Preventive Screenings Covered by Medicare:  6  Abdominal Aortic Aneurysm (AAA) Screening: covered once if your at risk  You're considered to be at risk if you have a family history of AAA    7  Lung Cancer Screening: covers low dose CT scan once per year if you meet all of the following conditions: (1) Age 50-69; (2) No signs or symptoms of lung cancer; (3) Current smoker or have quit smoking within the last 15 years; (4) You have a tobacco smoking history of at least 30 pack years (packs per day multiplied by number of years you smoked); (5) You get a written order from a healthcare provider  8  Glaucoma Screening: covered annually if you're considered high risk: (1) You have diabetes OR (2) Family history of glaucoma OR (3)  aged 48 and older OR (3)  American aged 72 and older  5  Osteoporosis Screening: covered every 2 years if you meet one of the following conditions: (1) You're estrogen deficient and at risk for osteoporosis based off medical history and other findings; (2) Have a vertebral abnormality; (3) On glucocorticoid therapy for more than 3 months; (4) Have primary hyperparathyroidism; (5) On osteoporosis medications and need to assess response to drug therapy  · Last bone density test (DXA Scan): 11/05/2013   10  HIV Screening: covered annually if you're between the age of 15-65  Also covered annually if you are younger than 13 and older than 72 with risk factors for HIV infection  For pregnant patients, it is covered up to 3 times per pregnancy  Immunizations:  Immunization Recommendations   Influenza Vaccine Annual influenza vaccination during flu season is recommended for all persons aged >= 6 months who do not have contraindications   Pneumococcal Vaccine (Prevnar and Pneumovax)  * Prevnar = PCV13  * Pneumovax = PPSV23   Adults 25-60 years old: 1-3 doses may be recommended based on certain risk factors  Adults 72 years old: Prevnar (PCV13) vaccine recommended followed by Pneumovax (PPSV23) vaccine  If already received PPSV23 since turning 65, then PCV13 recommended at least one year after PPSV23 dose     Hepatitis B Vaccine 3 dose series if at intermediate or high risk (ex: diabetes, end stage renal disease, liver disease)   Tetanus (Td) Vaccine - COST NOT COVERED BY MEDICARE PART B Following completion of primary series, a booster dose should be given every 10 years to maintain immunity against tetanus  Td may also be given as tetanus wound prophylaxis  Tdap Vaccine - COST NOT COVERED BY MEDICARE PART B Recommended at least once for all adults  For pregnant patients, recommended with each pregnancy  Shingles Vaccine (Shingrix) - COST NOT COVERED BY MEDICARE PART B  2 shot series recommended in those aged 48 and above     Health Maintenance Due:      Topic Date Due    Breast Cancer Screening: Mammogram  06/12/2021    Colorectal Cancer Screening  06/13/2022    Hepatitis C Screening  Completed     Immunizations Due:      Topic Date Due    Pneumococcal Vaccine: 65+ Years (2 of 2 - PPSV23) 08/24/2013    DTaP,Tdap,and Td Vaccines (3 - Td or Tdap) 10/12/2020    Influenza Vaccine (1) 09/01/2021     Advance Directives   What are advance directives? Advance directives are legal documents that state your wishes and plans for medical care  These plans are made ahead of time in case you lose your ability to make decisions for yourself  Advance directives can apply to any medical decision, such as the treatments you want, and if you want to donate organs  What are the types of advance directives? There are many types of advance directives, and each state has rules about how to use them  You may choose a combination of any of the following:  · Living will: This is a written record of the treatment you want  You can also choose which treatments you do not want, which to limit, and which to stop at a certain time  This includes surgery, medicine, IV fluid, and tube feedings  · Durable power of  for healthcare [facility]): This is a written record that states who you want to make healthcare choices for you when you are unable to make them for yourself  This person, called a proxy, is usually a family member or a friend  You may choose more than 1 proxy  · Do not resuscitate (DNR) order:  A DNR order is used in case your heart stops beating or you stop breathing   It is a request not to have certain forms of treatment, such as CPR  A DNR order may be included in other types of advance directives  · Medical directive: This covers the care that you want if you are in a coma, near death, or unable to make decisions for yourself  You can list the treatments you want for each condition  Treatment may include pain medicine, surgery, blood transfusions, dialysis, IV or tube feedings, and a ventilator (breathing machine)  · Values history: This document has questions about your views, beliefs, and how you feel and think about life  This information can help others choose the care that you would choose  Why are advance directives important? An advance directive helps you control your care  Although spoken wishes may be used, it is better to have your wishes written down  Spoken wishes can be misunderstood, or not followed  Treatments may be given even if you do not want them  An advance directive may make it easier for your family to make difficult choices about your care  Weight Management   Why it is important to manage your weight:  Being overweight increases your risk of health conditions such as heart disease, high blood pressure, type 2 diabetes, and certain types of cancer  It can also increase your risk for osteoarthritis, sleep apnea, and other respiratory problems  Aim for a slow, steady weight loss  Even a small amount of weight loss can lower your risk of health problems  How to lose weight safely:  A safe and healthy way to lose weight is to eat fewer calories and get regular exercise  You can lose up about 1 pound a week by decreasing the number of calories you eat by 500 calories each day  Healthy meal plan for weight management:  A healthy meal plan includes a variety of foods, contains fewer calories, and helps you stay healthy  A healthy meal plan includes the following:  · Eat whole-grain foods more often  A healthy meal plan should contain fiber   Fiber is the part of grains, fruits, and vegetables that is not broken down by your body  Whole-grain foods are healthy and provide extra fiber in your diet  Some examples of whole-grain foods are whole-wheat breads and pastas, oatmeal, brown rice, and bulgur  · Eat a variety of vegetables every day  Include dark, leafy greens such as spinach, kale, luis greens, and mustard greens  Eat yellow and orange vegetables such as carrots, sweet potatoes, and winter squash  · Eat a variety of fruits every day  Choose fresh or canned fruit (canned in its own juice or light syrup) instead of juice  Fruit juice has very little or no fiber  · Eat low-fat dairy foods  Drink fat-free (skim) milk or 1% milk  Eat fat-free yogurt and low-fat cottage cheese  Try low-fat cheeses such as mozzarella and other reduced-fat cheeses  · Choose meat and other protein foods that are low in fat  Choose beans or other legumes such as split peas or lentils  Choose fish, skinless poultry (chicken or turkey), or lean cuts of red meat (beef or pork)  Before you cook meat or poultry, cut off any visible fat  · Use less fat and oil  Try baking foods instead of frying them  Add less fat, such as margarine, sour cream, regular salad dressing and mayonnaise to foods  Eat fewer high-fat foods  Some examples of high-fat foods include french fries, doughnuts, ice cream, and cakes  · Eat fewer sweets  Limit foods and drinks that are high in sugar  This includes candy, cookies, regular soda, and sweetened drinks  Exercise:  Exercise at least 30 minutes per day on most days of the week  Some examples of exercise include walking, biking, dancing, and swimming  You can also fit in more physical activity by taking the stairs instead of the elevator or parking farther away from stores  Ask your healthcare provider about the best exercise plan for you        © Copyright [employer] 2018 Information is for End User's use only and may not be sold, redistributed or otherwise used for commercial purposes   All illustrations and images included in CareNotes® are the copyrighted property of [employer]  or [facility] Pily Loza

## 2022-03-21 ENCOUNTER — TELEPHONE (OUTPATIENT)
Dept: FAMILY MEDICINE CLINIC | Facility: HOSPITAL | Age: 72
End: 2022-03-21

## 2022-03-21 PROBLEM — S61.459A CAT BITE OF HAND: Status: RESOLVED | Noted: 2021-06-29 | Resolved: 2022-03-21
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
## 2022-03-21 PROBLEM — R07.89 ATYPICAL CHEST PAIN: Status: RESOLVED | Noted: 2020-06-11 | Resolved: 2022-03-21

## 2022-03-21 NOTE — TELEPHONE ENCOUNTER
PATIENT HAD REACTION TO [employer]VAIR DISKUS - MADE HER EXTREMELY JITTERY - PATIENT HAS NOT USED IT SINCE - PCB

## 2022-03-30 DIAGNOSIS — E78.5 HYPERLIPIDEMIA, UNSPECIFIED HYPERLIPIDEMIA TYPE: Primary | ICD-10-CM

## 2022-03-30 RX ORDER — PRAVASTATIN SODIUM 10 MG
10 TABLET ORAL
Qty: 30 TABLET | Refills: 0 | Status: SHIPPED | OUTPATIENT
Start: 2022-03-30 | End: 2022-04-20 | Stop reason: SDUPTHER

## 2022-04-20 DIAGNOSIS — E78.5 HYPERLIPIDEMIA, UNSPECIFIED HYPERLIPIDEMIA TYPE: ICD-10-CM

## 2022-04-20 DIAGNOSIS — J45.909 MILD ASTHMA WITHOUT COMPLICATION, UNSPECIFIED WHETHER PERSISTENT: ICD-10-CM

## 2022-04-20 RX ORDER — FLUTICASONE PROPIONATE 100 MCG
1 BLISTER, WITH INHALATION DEVICE INHALATION 2 TIMES DAILY
Qty: 60 BLISTER | Refills: 0 | Status: SHIPPED | OUTPATIENT
Start: 2022-04-20 | End: 2022-06-16 | Stop reason: SDUPTHER

## 2022-04-20 RX ORDER — PRAVASTATIN SODIUM 10 MG
10 TABLET ORAL
Qty: 30 TABLET | Refills: 0 | Status: SHIPPED | OUTPATIENT
Start: 2022-04-20 | End: 2022-05-17

## 2022-04-30 DIAGNOSIS — F41.9 ANXIETY: ICD-10-CM

## 2022-05-01 ENCOUNTER — OFFICE VISIT (OUTPATIENT)
Dept: URGENT CARE | Facility: CLINIC | Age: 72
End: 2022-05-01
Payer: COMMERCIAL

## 2022-05-01 VITALS
OXYGEN SATURATION: 97 % | WEIGHT: 175 LBS | SYSTOLIC BLOOD PRESSURE: 152 MMHG | BODY MASS INDEX: 33.04 KG/M2 | DIASTOLIC BLOOD PRESSURE: 69 MMHG | RESPIRATION RATE: 20 BRPM | HEART RATE: 73 BPM | HEIGHT: 61 IN | TEMPERATURE: 98.5 F

## 2022-05-01 DIAGNOSIS — T16.1XXA FOREIGN BODY OF RIGHT EAR, INITIAL ENCOUNTER: Primary | ICD-10-CM

## 2022-05-01 PROCEDURE — 99213 OFFICE O/P EST LOW 20 MIN: CPT | Performed by: PREVENTIVE MEDICINE

## 2022-05-01 RX ORDER — CITALOPRAM 40 MG/1
TABLET ORAL
Qty: 90 TABLET | Refills: 3 | Status: SHIPPED | OUTPATIENT
Start: 2022-05-01 | End: 2022-06-16 | Stop reason: SDUPTHER

## 2022-05-01 RX ORDER — PENICILLIN V POTASSIUM 500 MG/1
TABLET ORAL
COMMUNITY
Start: 2022-04-30

## 2022-05-01 NOTE — PROGRESS NOTES
[facility]        NAME: iVvi Fields is a 66 y o  female  : 1950    MRN: [id number]  DATE: May 1, 2022  TIME: 3:54 PM    Assessment and Plan   Foreign body of right ear, initial encounter Germaine Bass  1XXA]  1  Foreign body of right ear, initial encounter           Patient Instructions       Follow up with PCP in 3-5 days  Proceed to  ER if symptoms worsen  Chief Complaint     Chief Complaint   Patient presents with   Fam pierson today, right ear, was cleaning ear out with q tip, and when qtip removed white "fluff" was missing from qtip, now  has muffled hearing, no pain at this time          History of Present Illness       Concerned that she broke a Q-tip off in her right ear      Review of Systems   Review of Systems   HENT: Positive for ear pain            Current Medications       Current Outpatient Medications:     albuterol (2 5 mg/3 mL) 0 083 % nebulizer solution, Take 1 vial (2 5 mg total) by nebulization every 6 (six) hours as needed for wheezing or shortness of breath, Disp: 45 vial, Rfl: 0    albuterol (ProAir HFA) 90 mcg/act inhaler, Inhale 2 puffs every 6 (six) hours as needed for wheezing, Disp: 18 g, Rfl: 0    amLODIPine (NORVASC) 5 mg tablet, TAKE ONE TABLET BY MOUTH EVERY DAY, Disp: 90 tablet, Rfl: 0    aspirin 81 mg chewable tablet, Chew 1 tablet daily, Disp: , Rfl:     busPIRone (BUSPAR) 10 mg tablet, TAKE ONE TABLET BY MOUTH THREE TIMES A DAY, Disp: 90 tablet, Rfl: 0    citalopram (CeleXA) 40 mg tablet, TAKE ONE TABLET BY MOUTH EVERY DAY, Disp: 90 tablet, Rfl: 3    diphenoxylate-atropine (LOMOTIL) 2 5-0 025 mg per tablet, Take 1 tablet by mouth daily as needed, Disp: , Rfl:     Fluticasone Propionate, Inhal, (Flovent Diskus) 100 MCG/BLIST AEPB, Inhale 1 puff 2 (two) times a day Rinse mouth after use , Disp: 60 blister, Rfl: 0    Lactobacillus (Acidophilus) 100 MG CAPS, Take by mouth, Disp: , Rfl:     levothyroxine 25 mcg tablet, TAKE ONE TABLET BY MOUTH EVERY DAY, Disp: 90 tablet, Rfl: 0    Multiple Vitamin (MULTIVITAMIN) tablet, Take 1 tablet by mouth daily, Disp: , Rfl:     omeprazole (PriLOSEC) 40 MG capsule, TAKE ONE CAPSULE BY MOUTH EVERY DAY, Disp: 90 capsule, Rfl: 0    pantoprazole (PROTONIX) 40 mg tablet, TAKE ONE TABLET BY MOUTH EVERY DAY, Disp: 90 tablet, Rfl: 0    penicillin V potassium (VEETID) 500 mg tablet, TAKE ONE TABLET BY MOUTH FOUR TIMES A DAY UNTIL FINISHED, Disp: , Rfl:     pravastatin (PRAVACHOL) 10 mg tablet, Take 1 tablet (10 mg total) by mouth daily at bedtime, Disp: 30 tablet, Rfl: 0    silver sulfadiazine (SILVADENE,SSD) 1 % cream, Apply topically daily, Disp: 50 g, Rfl: 0    meloxicam (MOBIC) 15 mg tablet, Take 1 tablet (15 mg total) by mouth daily, Disp: 90 tablet, Rfl: 3    Current Allergies     Allergies as of 05/01/2022 - Reviewed 05/01/2022   Allergen Reaction Noted    Pneumococcal polysaccharide vaccine Fever 04/19/2012    Corrie Allergic Rhinitis, Headache, Nasal Congestion, and Visual Disturbance 11/27/2020            The following portions of the patient's history were reviewed and updated as appropriate: allergies, current medications, past family history, past medical history, past social history, past surgical history and problem list      Past Medical History:   Diagnosis Date    Anxiety     Asthma     Atypical chest pain 6/11/2020    Cat bite of hand 6/29/2021    Disease of thyroid gland     GERD (gastroesophageal reflux disease)     Hearing loss     Heart murmur     Migraine     Peripheral vertigo     Raynaud disease     S/P trigger finger release     Sleep apnea     Visual disturbance        Past Surgical History:   Procedure Laterality Date    CATARACT EXTRACTION      COLON SURGERY      EYE SURGERY  2019    GLAUCOMA SURGERY      HALLUX VALGUS CORRECTION Right 06/19/2007    INCISION TENDON SHEATH HAND  05/23/2011    Release of A1 pulley    KNEE ARTHROSCOPY W/ MENISCAL REPAIR Left 2003    KNEE SURGERY  2005    ME ESOPHAGOGASTRODUODENOSCOPY TRANSORAL DIAGNOSTIC N/A 8/22/2018    Procedure: ESOPHAGOGASTRODUODENOSCOPY (EGD); Surgeon: Celia Miller MD;  [facility];  Service: Gastroenterology    ME INCISE FINGER TENDON SHEATH Left 7/11/2019    Procedure: RELEASE TRIGGER FINGER left ring;  Surgeon: Nuno Monsalve MD;  [facility];  Service: Orthopedics    TRIGGER FINGER RELEASE      TUBAL LIGATION  1977    UPPER GASTROINTESTINAL ENDOSCOPY         Family History   Problem Relation Age of Onset    Lung cancer Mother     Cancer Mother         80 Lung    Stomach cancer Father     Cancer Father         0 Stomach    Other Maternal Grandmother         Stroke syndrome    Other Maternal Aunt         Cardiomegaly    No Known Problems Sister     No Known Problems Daughter     No Known Problems Maternal Grandfather     No Known Problems Paternal Grandmother     No Known Problems Paternal Grandfather     No Known Problems Paternal Aunt     No Known Problems Paternal Aunt     No Known Problems Paternal Aunt          Medications have been verified  Objective   /69   Pulse 73   Temp 98 5 °F (36 9 °C) (Tympanic)   Resp 20   Ht 5' 1" (1 549 m)   Wt 79 4 kg (175 lb)   SpO2 97%   BMI 33 07 kg/m²   No LMP recorded  Patient is postmenopausal        Physical Exam     Physical Exam  HENT:      Ears:      Comments:  Both ear canals are clean without  a foreign body

## 2022-05-02 ENCOUNTER — TELEPHONE (OUTPATIENT)
Dept: OBGYN CLINIC | Facility: OTHER | Age: 72
End: 2022-05-02

## 2022-05-02 NOTE — TELEPHONE ENCOUNTER
Patient called in looking to schedule a follow up, she is trying work a vacation around when she comes to see Dr Kg HENRY/karel # [phone]    She was last seen in 07-, she would be coming in for the same issue

## 2022-05-03 NOTE — TELEPHONE ENCOUNTER
Patient called into the office again in regards to this        Please call patient back at [phone]

## 2022-05-04 DIAGNOSIS — R19.7 DIARRHEA, UNSPECIFIED TYPE: Primary | ICD-10-CM

## 2022-05-04 RX ORDER — DIPHENOXYLATE HYDROCHLORIDE AND ATROPINE SULFATE 2.5; .025 MG/1; MG/1
1 TABLET ORAL DAILY PRN
Qty: 30 TABLET | Refills: 0 | Status: SHIPPED | OUTPATIENT
Start: 2022-05-04

## 2022-05-17 DIAGNOSIS — E78.5 HYPERLIPIDEMIA, UNSPECIFIED HYPERLIPIDEMIA TYPE: ICD-10-CM

## 2022-05-17 RX ORDER — PRAVASTATIN SODIUM 10 MG
TABLET ORAL
Qty: 30 TABLET | Refills: 0 | Status: SHIPPED | OUTPATIENT
Start: 2022-05-17 | End: 2022-06-16 | Stop reason: SDUPTHER

## 2022-06-08 ENCOUNTER — TELEPHONE (OUTPATIENT)
Dept: FAMILY MEDICINE CLINIC | Facility: HOSPITAL | Age: 72
End: 2022-06-08

## 2022-06-08 DIAGNOSIS — R53.83 FATIGUE, UNSPECIFIED TYPE: Primary | ICD-10-CM

## 2022-06-08 NOTE — TELEPHONE ENCOUNTER
Pt requesting orders for Lymes be added to upcoming orders  Has been feeling extremely fatigued, joints are achey  Pt has had Lymes before and feels the same symptoms are returning  Is getting her labs done Monday for upcoming appt, would like them to be added to [facility] if any questions

## 2022-06-09 ENCOUNTER — HOSPITAL ENCOUNTER (OUTPATIENT)
Dept: MAMMOGRAPHY | Facility: CLINIC | Age: 72
Discharge: HOME | End: 2022-06-09
Payer: COMMERCIAL

## 2022-06-09 VITALS — BODY MASS INDEX: 33.04 KG/M2 | HEIGHT: 61 IN | WEIGHT: 175 LBS

## 2022-06-09 DIAGNOSIS — Z12.31 ENCOUNTER FOR SCREENING MAMMOGRAM FOR MALIGNANT NEOPLASM OF BREAST: ICD-10-CM

## 2022-06-09 PROCEDURE — 77063 BREAST TOMOSYNTHESIS BI: CPT

## 2022-06-09 PROCEDURE — 77067 SCR MAMMO BI INCL CAD: CPT

## 2022-06-13 ENCOUNTER — TELEPHONE (OUTPATIENT)
Dept: FAMILY MEDICINE CLINIC | Facility: HOSPITAL | Age: 72
End: 2022-06-13

## 2022-06-13 DIAGNOSIS — E03.9 HYPOTHYROIDISM, UNSPECIFIED TYPE: Primary | ICD-10-CM

## 2022-06-13 NOTE — TELEPHONE ENCOUNTER
Pt requesting TSH lab be added to her orders  Asking to be added ASAP as she is going this AM to have drawn

## 2022-06-14 LAB
ALBUMIN SERPL-MCNC: 4.5 G/DL (ref 3.7–4.7)
ALBUMIN/GLOB SERPL: 1.9 {RATIO} (ref 1.2–2.2)
ALP SERPL-CCNC: 47 IU/L (ref 44–121)
ALT SERPL-CCNC: 20 IU/L (ref 0–32)
AST SERPL-CCNC: 23 IU/L (ref 0–40)
B BURGDOR IGG PATRN SER IB-IMP: NEGATIVE
B BURGDOR IGM PATRN SER IB-IMP: NEGATIVE
B BURGDOR18KD IGG SER QL IB: ABNORMAL
B BURGDOR23KD IGG SER QL IB: ABNORMAL
B BURGDOR23KD IGM SER QL IB: ABNORMAL
B BURGDOR28KD IGG SER QL IB: ABNORMAL
B BURGDOR30KD IGG SER QL IB: ABNORMAL
B BURGDOR39KD IGG SER QL IB: ABNORMAL
B BURGDOR39KD IGM SER QL IB: ABNORMAL
B BURGDOR41KD IGG SER QL IB: PRESENT
B BURGDOR41KD IGM SER QL IB: ABNORMAL
B BURGDOR45KD IGG SER QL IB: ABNORMAL
B BURGDOR58KD IGG SER QL IB: ABNORMAL
B BURGDOR66KD IGG SER QL IB: ABNORMAL
B BURGDOR93KD IGG SER QL IB: ABNORMAL
BILIRUB SERPL-MCNC: 0.3 MG/DL (ref 0–1.2)
BUN SERPL-MCNC: 15 MG/DL (ref 8–27)
BUN/CREAT SERPL: 18 (ref 12–28)
CALCIUM SERPL-MCNC: 9.8 MG/DL (ref 8.7–10.3)
CHLORIDE SERPL-SCNC: 102 MMOL/L (ref 96–106)
CHOLEST SERPL-MCNC: 171 MG/DL (ref 100–199)
CO2 SERPL-SCNC: 24 MMOL/L (ref 20–29)
CREAT SERPL-MCNC: 0.82 MG/DL (ref 0.57–1)
EGFR: 75 ML/MIN/1.73
GLOBULIN SER-MCNC: 2.4 G/DL (ref 1.5–4.5)
GLUCOSE SERPL-MCNC: 113 MG/DL (ref 65–99)
HDLC SERPL-MCNC: 59 MG/DL
LDLC SERPL CALC-MCNC: 94 MG/DL (ref 0–99)
LDLC/HDLC SERPL: 1.6 RATIO (ref 0–3.2)
POTASSIUM SERPL-SCNC: 4.7 MMOL/L (ref 3.5–5.2)
PROT SERPL-MCNC: 6.9 G/DL (ref 6–8.5)
SL AMB VLDL CHOLESTEROL CALC: 18 MG/DL (ref 5–40)
SODIUM SERPL-SCNC: 140 MMOL/L (ref 134–144)
TRIGL SERPL-MCNC: 100 MG/DL (ref 0–149)

## 2022-06-16 ENCOUNTER — OFFICE VISIT (OUTPATIENT)
Dept: FAMILY MEDICINE CLINIC | Facility: HOSPITAL | Age: 72
End: 2022-06-16
Payer: COMMERCIAL

## 2022-06-16 VITALS
TEMPERATURE: 97.9 F | HEART RATE: 78 BPM | DIASTOLIC BLOOD PRESSURE: 72 MMHG | HEIGHT: 61 IN | BODY MASS INDEX: 32.55 KG/M2 | SYSTOLIC BLOOD PRESSURE: 142 MMHG | WEIGHT: 172.4 LBS

## 2022-06-16 DIAGNOSIS — F41.9 ANXIETY: ICD-10-CM

## 2022-06-16 DIAGNOSIS — E78.49 OTHER HYPERLIPIDEMIA: ICD-10-CM

## 2022-06-16 DIAGNOSIS — K44.9 HIATAL HERNIA: ICD-10-CM

## 2022-06-16 DIAGNOSIS — E78.5 HYPERLIPIDEMIA, UNSPECIFIED HYPERLIPIDEMIA TYPE: ICD-10-CM

## 2022-06-16 DIAGNOSIS — E03.9 ACQUIRED HYPOTHYROIDISM: ICD-10-CM

## 2022-06-16 DIAGNOSIS — J45.40 MODERATE PERSISTENT REACTIVE AIRWAY DISEASE WITHOUT COMPLICATION: ICD-10-CM

## 2022-06-16 DIAGNOSIS — I10 ESSENTIAL HYPERTENSION: ICD-10-CM

## 2022-06-16 DIAGNOSIS — J45.909 MILD ASTHMA WITHOUT COMPLICATION, UNSPECIFIED WHETHER PERSISTENT: ICD-10-CM

## 2022-06-16 DIAGNOSIS — I10 PRIMARY HYPERTENSION: Primary | ICD-10-CM

## 2022-06-16 PROBLEM — Z00.00 MEDICARE ANNUAL WELLNESS VISIT, SUBSEQUENT: Status: RESOLVED | Noted: 2021-01-05 | Resolved: 2022-06-16

## 2022-06-16 PROCEDURE — 99214 OFFICE O/P EST MOD 30 MIN: CPT | Performed by: FAMILY MEDICINE

## 2022-06-16 RX ORDER — LEVOTHYROXINE SODIUM 0.03 MG/1
25 TABLET ORAL DAILY
Qty: 90 TABLET | Refills: 3 | Status: SHIPPED | OUTPATIENT
Start: 2022-06-16 | End: 2022-06-16 | Stop reason: SDUPTHER

## 2022-06-16 RX ORDER — FLUTICASONE PROPIONATE 100 MCG
1 BLISTER, WITH INHALATION DEVICE INHALATION 2 TIMES DAILY
Qty: 180 BLISTER | Refills: 3 | Status: SHIPPED | OUTPATIENT
Start: 2022-06-16 | End: 2023-06-16

## 2022-06-16 RX ORDER — CITALOPRAM 40 MG/1
40 TABLET ORAL DAILY
Qty: 90 TABLET | Refills: 3 | Status: SHIPPED | OUTPATIENT
Start: 2022-06-16 | End: 2022-06-16 | Stop reason: SDUPTHER

## 2022-06-16 RX ORDER — LEVOTHYROXINE SODIUM 0.03 MG/1
25 TABLET ORAL DAILY
Qty: 90 TABLET | Refills: 3 | Status: SHIPPED | OUTPATIENT
Start: 2022-06-16

## 2022-06-16 RX ORDER — AMLODIPINE BESYLATE 5 MG/1
5 TABLET ORAL DAILY
Qty: 90 TABLET | Refills: 3 | Status: SHIPPED | OUTPATIENT
Start: 2022-06-16 | End: 2022-06-16 | Stop reason: SDUPTHER

## 2022-06-16 RX ORDER — FLUTICASONE PROPIONATE 100 MCG
1 BLISTER, WITH INHALATION DEVICE INHALATION 2 TIMES DAILY
Qty: 180 BLISTER | Refills: 3 | Status: SHIPPED | OUTPATIENT
Start: 2022-06-16 | End: 2022-06-16 | Stop reason: SDUPTHER

## 2022-06-16 RX ORDER — CITALOPRAM 40 MG/1
40 TABLET ORAL DAILY
Qty: 90 TABLET | Refills: 3 | Status: SHIPPED | OUTPATIENT
Start: 2022-06-16

## 2022-06-16 RX ORDER — PRAVASTATIN SODIUM 10 MG
10 TABLET ORAL
Qty: 90 TABLET | Refills: 3 | Status: SHIPPED | OUTPATIENT
Start: 2022-06-16 | End: 2022-06-16 | Stop reason: SDUPTHER

## 2022-06-16 RX ORDER — PRAVASTATIN SODIUM 10 MG
10 TABLET ORAL
Qty: 90 TABLET | Refills: 3 | Status: SHIPPED | OUTPATIENT
Start: 2022-06-16

## 2022-06-16 RX ORDER — AMLODIPINE BESYLATE 5 MG/1
5 TABLET ORAL DAILY
Qty: 90 TABLET | Refills: 3 | Status: SHIPPED | OUTPATIENT
Start: 2022-06-16

## 2022-06-16 NOTE — TELEPHONE ENCOUNTER
PATIENT TOLD US THE WRONG [facility] - SHE REALLY NEEDS TO USE [facility] - PLEASE RESEND ALL MEDS THERE - PLEASE CANCEL AT [address]

## 2022-06-17 ENCOUNTER — TELEPHONE (OUTPATIENT)
Dept: GASTROENTEROLOGY | Facility: CLINIC | Age: 72
End: 2022-06-17

## 2022-06-17 NOTE — PROGRESS NOTES
Assessment/Plan:      Problem List Items Addressed This Visit        Endocrine    Hypothyroidism    Relevant Orders    TSH, 3rd generation with Free T4 reflex       Respiratory    Reactive airway disease       Cardiovascular and Mediastinum    Hypertension - Primary       Other    Anxiety    Hiatal hernia    Hyperlipidemia    Relevant Orders    CBC    Comprehensive metabolic panel    Lipid Panel with Direct LDL reflex    TSH, 3rd generation with Free T4 reflex      Other Visit Diagnoses     Mild asthma without complication, unspecified whether persistent        Essential hypertension               Plan/Discussion:  Patient is doing well  Chronic conditions are reviewed and are stable  Patient with intermittent chest wall pain due to costochondritis  Advise trial of lidocaine patches when this flares up  Medications refilled  Follow-up in 6 months  Subjective:   Chief Complaint   Patient presents with    Follow-up     3 month         Patient ID: Melquiades Peterson is a 66 y o  female  Patient is here for follow-up of chronic conditions  She has no new concerns  [facility]  She does have a history of costochondritis which flares up from time to time  Asking if there is anything else she can do  Patient had recent labs done  These are reviewed with her  The following portions of the patient's history were reviewed and updated as appropriate: allergies, current medications, past family history, past medical history, past social history, past surgical history and problem list     Review of Systems   Constitutional: Negative  Negative for activity change, appetite change, chills, diaphoresis, fatigue and fever  HENT: Negative for congestion, facial swelling and sore throat  Respiratory: Negative  Negative for apnea, cough, chest tightness and shortness of breath  Cardiovascular: Negative  Negative for chest pain and palpitations  Gastrointestinal: Negative  Negative for abdominal distention, abdominal pain, blood in stool, constipation, diarrhea and nausea  Genitourinary: Negative  Negative for difficulty urinating, dysuria, flank pain and frequency  Objective:  Vitals:    06/16/22 1325   BP: 142/72   Pulse: 78   Temp: 97 9 °F (36 6 °C)   Weight: 78 2 kg (172 lb 6 4 oz)   Height: 5' 1" (1 549 m)     BP Readings from Last 6 Encounters:   06/16/22 142/72   05/01/22 152/69   03/17/22 148/82   01/05/21 135/80   12/02/20 138/70   10/20/20 140/76      Wt Readings from Last 6 Encounters:   06/16/22 78 2 kg (172 lb 6 4 oz)   06/09/22 79 4 kg (175 lb)   05/01/22 79 4 kg (175 lb)   03/17/22 79 4 kg (175 lb)   01/05/21 77 6 kg (171 lb)   12/02/20 78 5 kg (173 lb)             Physical Exam  Vitals and nursing note reviewed  Constitutional:       General: She is not in acute distress  Appearance: Normal appearance  She is well-developed and normal weight  HENT:      Head: Normocephalic and atraumatic  Right Ear: Tympanic membrane, ear canal and external ear normal       Left Ear: Tympanic membrane, ear canal and external ear normal       Nose: Nose normal       Mouth/Throat:      Mouth: Mucous membranes are moist    Eyes:      Conjunctiva/sclera: Conjunctivae normal       Pupils: Pupils are equal, round, and reactive to light  Neck:      Thyroid: No thyromegaly  Trachea: No tracheal deviation  Cardiovascular:      Rate and Rhythm: Normal rate and regular rhythm  Heart sounds: Normal heart sounds  No murmur heard  Pulmonary:      Effort: Pulmonary effort is normal  No respiratory distress  Breath sounds: Normal breath sounds  No wheezing  Abdominal:      General: Bowel sounds are normal       Palpations: Abdomen is soft  Musculoskeletal:         General: Normal range of motion  Cervical back: Normal range of motion and neck supple  Skin:     General: Skin is warm and dry        Capillary Refill: Capillary refill takes less than 2 seconds  Neurological:      General: No focal deficit present  Mental Status: She is alert and oriented to person, place, and time     Psychiatric:         Mood and Affect: Mood normal          Behavior: Behavior normal

## 2022-06-17 NOTE — TELEPHONE ENCOUNTER
Pablo Cobos called, she is due for her recall colon and would like an EGD as well  She is having issues with her hiatal hernia  Ok to schedule as combo?   Thank you

## 2022-06-27 ENCOUNTER — OFFICE VISIT (OUTPATIENT)
Dept: OBGYN CLINIC | Facility: CLINIC | Age: 72
End: 2022-06-27
Payer: COMMERCIAL

## 2022-06-27 ENCOUNTER — TELEPHONE (OUTPATIENT)
Dept: OBGYN CLINIC | Facility: HOSPITAL | Age: 72
End: 2022-06-27

## 2022-06-27 VITALS
BODY MASS INDEX: 32.93 KG/M2 | SYSTOLIC BLOOD PRESSURE: 138 MMHG | DIASTOLIC BLOOD PRESSURE: 68 MMHG | WEIGHT: 174.4 LBS | HEIGHT: 61 IN

## 2022-06-27 DIAGNOSIS — M65.321 TRIGGER INDEX FINGER OF RIGHT HAND: Primary | ICD-10-CM

## 2022-06-27 PROCEDURE — 1036F TOBACCO NON-USER: CPT | Performed by: ORTHOPAEDIC SURGERY

## 2022-06-27 PROCEDURE — 99214 OFFICE O/P EST MOD 30 MIN: CPT | Performed by: ORTHOPAEDIC SURGERY

## 2022-06-27 PROCEDURE — 1160F RVW MEDS BY RX/DR IN RCRD: CPT | Performed by: ORTHOPAEDIC SURGERY

## 2022-06-27 PROCEDURE — 3078F DIAST BP <80 MM HG: CPT | Performed by: ORTHOPAEDIC SURGERY

## 2022-06-27 PROCEDURE — 3008F BODY MASS INDEX DOCD: CPT | Performed by: ORTHOPAEDIC SURGERY

## 2022-06-27 PROCEDURE — 3075F SYST BP GE 130 - 139MM HG: CPT | Performed by: ORTHOPAEDIC SURGERY

## 2022-06-27 PROCEDURE — 20550 NJX 1 TENDON SHEATH/LIGAMENT: CPT | Performed by: ORTHOPAEDIC SURGERY

## 2022-06-27 RX ORDER — BETAMETHASONE SODIUM PHOSPHATE AND BETAMETHASONE ACETATE 3; 3 MG/ML; MG/ML
6 INJECTION, SUSPENSION INTRA-ARTICULAR; INTRALESIONAL; INTRAMUSCULAR; SOFT TISSUE
Status: COMPLETED | OUTPATIENT
Start: 2022-06-27 | End: 2022-06-27

## 2022-06-27 RX ORDER — LIDOCAINE HYDROCHLORIDE 10 MG/ML
1 INJECTION, SOLUTION INFILTRATION; PERINEURAL
Status: COMPLETED | OUTPATIENT
Start: 2022-06-27 | End: 2022-06-27

## 2022-06-27 RX ORDER — MELATONIN
1000 DAILY
COMMUNITY

## 2022-06-27 RX ADMIN — BETAMETHASONE SODIUM PHOSPHATE AND BETAMETHASONE ACETATE 6 MG: 3; 3 INJECTION, SUSPENSION INTRA-ARTICULAR; INTRALESIONAL; INTRAMUSCULAR; SOFT TISSUE at 11:51

## 2022-06-27 RX ADMIN — LIDOCAINE HYDROCHLORIDE 1 ML: 10 INJECTION, SOLUTION INFILTRATION; PERINEURAL at 11:51

## 2022-06-27 NOTE — PROGRESS NOTES
ASSESSMENT/PLAN:    Assessment:   Trigger Finger  right  index finger    Plan:   Patient will proceed with her first right index finger steroid injection  She has no formal restrictions  Information sheets provided  Follow Up:  6  week(s)    To Do Next Visit:       General Discussions:     Trigger FInger: The anatomy and physiology of trigger finger was discussed with the patient today in the office  Edema and increased contact pressure within the flexor tendons at the A1 pulley can cause pain, crepitation, and limitation of function  Treatment options include resting MP blocking splints to decrease edema, oral anti-inflammatory medications, home or formal therapy exercises, up to 2 steroid injections within the tendon sheath, or surgical release  While majority of patients do respond to conservative treatment, up to 20% may require surgical release  Operative Discussions:       _____________________________________________________  CHIEF COMPLAINT:  Chief Complaint   Patient presents with    Right Hand - Pain     Right thumb and right index finger          SUBJECTIVE:  Diana Falk is a 66 y o  female who presents with Catching and Locking to the right index finger  This started  6 month(s) ago as Insidious      Radiation: Yes to the  index finger  Previous Treatments: activity modification without relief  Associated symptoms: No Complaints  Handedness: right    PAST MEDICAL HISTORY:  Past Medical History:   Diagnosis Date    Anxiety     Asthma     Atypical chest pain 6/11/2020    Cat bite of hand 6/29/2021    Disease of thyroid gland     GERD (gastroesophageal reflux disease)     Hearing loss     Heart murmur     Medicare annual wellness visit, subsequent 1/5/2021    Migraine     Peripheral vertigo     Raynaud disease     S/P trigger finger release     Sleep apnea     Visual disturbance        PAST SURGICAL HISTORY:  Past Surgical History:   Procedure Laterality Date    CATARACT EXTRACTION      COLON SURGERY      EYE SURGERY  2019    GLAUCOMA SURGERY      HALLUX VALGUS CORRECTION Right 06/19/2007    INCISION TENDON SHEATH HAND  05/23/2011    Release of A1 pulley    KNEE ARTHROSCOPY W/ MENISCAL REPAIR Left 2003    KNEE SURGERY  2005    MT ESOPHAGOGASTRODUODENOSCOPY TRANSORAL DIAGNOSTIC N/A 8/22/2018    Procedure: ESOPHAGOGASTRODUODENOSCOPY (EGD); Surgeon: Zayra Manning MD;  [facility];  Service: Gastroenterology    MT INCISE FINGER TENDON SHEATH Left 7/11/2019    Procedure: RELEASE TRIGGER FINGER left ring;  Surgeon: Safia Gonzalez MD;  [facility];  Service: Orthopedics    TRIGGER FINGER RELEASE      TUBAL LIGATION  1977    UPPER GASTROINTESTINAL ENDOSCOPY         FAMILY HISTORY:  Family History   Problem Relation Age of Onset    Lung cancer Mother     Cancer Mother         80 Lung    Stomach cancer Father     Cancer Father         1998 Stomach    No Known Problems Sister     No Known Problems Daughter     Other Maternal Grandmother         Stroke syndrome    No Known Problems Maternal Grandfather     No Known Problems Paternal Grandmother     No Known Problems Paternal Grandfather     Other Maternal Aunt         Cardiomegaly    No Known Problems Paternal Aunt     No Known Problems Paternal Aunt     No Known Problems Paternal Aunt        SOCIAL HISTORY:  Social History     Tobacco Use    Smoking status: Never Smoker    Smokeless tobacco: Never Used   Vaping Use    Vaping Use: Never used   Substance Use Topics    Alcohol use:  Yes     Alcohol/week: 5 0 standard drinks     Types: 5 Cans of beer per week    Drug use: No       MEDICATIONS:    Current Outpatient Medications:     albuterol (2 5 mg/3 mL) 0 083 % nebulizer solution, Take 1 vial (2 5 mg total) by nebulization every 6 (six) hours as needed for wheezing or shortness of breath, Disp: 45 vial, Rfl: 0    albuterol (ProAir HFA) 90 mcg/act inhaler, Inhale 2 puffs every 6 (six) hours as needed for wheezing, Disp: 18 g, Rfl: 0    amLODIPine (NORVASC) 5 mg tablet, Take 1 tablet (5 mg total) by mouth daily, Disp: 90 tablet, Rfl: 3    aspirin 81 mg chewable tablet, Chew 1 tablet daily, Disp: , Rfl:     busPIRone (BUSPAR) 10 mg tablet, TAKE ONE TABLET BY MOUTH THREE TIMES A DAY, Disp: 90 tablet, Rfl: 0    cholecalciferol (VITAMIN D3) 1,000 units tablet, Take 1,000 Units by mouth daily, Disp: , Rfl:     citalopram (CeleXA) 40 mg tablet, Take 1 tablet (40 mg total) by mouth daily, Disp: 90 tablet, Rfl: 3    diphenoxylate-atropine (LOMOTIL) 2 5-0 025 mg per tablet, Take 1 tablet by mouth daily as needed (as needed), Disp: 30 tablet, Rfl: 0    Fluticasone Propionate, Inhal, (Flovent Diskus) 100 MCG/BLIST AEPB, Inhale 1 puff 2 (two) times a day Rinse mouth after use , Disp: 180 blister, Rfl: 3    Lactobacillus (Acidophilus) 100 MG CAPS, Take by mouth, Disp: , Rfl:     levothyroxine 25 mcg tablet, Take 1 tablet (25 mcg total) by mouth daily, Disp: 90 tablet, Rfl: 3    Multiple Vitamin (MULTIVITAMIN) tablet, Take 1 tablet by mouth daily, Disp: , Rfl:     omeprazole (PriLOSEC) 40 MG capsule, TAKE ONE CAPSULE BY MOUTH EVERY DAY, Disp: 90 capsule, Rfl: 0    pantoprazole (PROTONIX) 40 mg tablet, TAKE ONE TABLET BY MOUTH EVERY DAY, Disp: 90 tablet, Rfl: 0    penicillin V potassium (VEETID) 500 mg tablet, TAKE ONE TABLET BY MOUTH FOUR TIMES A DAY UNTIL FINISHED, Disp: , Rfl:     pravastatin (PRAVACHOL) 10 mg tablet, Take 1 tablet (10 mg total) by mouth daily at bedtime, Disp: 90 tablet, Rfl: 3    silver sulfadiazine (SILVADENE,SSD) 1 % cream, Apply topically daily, Disp: 50 g, Rfl: 0    meloxicam (MOBIC) 15 mg tablet, Take 1 tablet (15 mg total) by mouth daily, Disp: 90 tablet, Rfl: 3    ALLERGIES:  Allergies   Allergen Reactions    Pneumococcal Polysaccharide Vaccine Fever     Other reaction(s): Chills, fever and mottling  Category:  Allergy;     Corrie Allergic Rhinitis, Headache, Nasal Congestion and Visual Disturbance       REVIEW OF SYSTEMS:  Pertinent items are noted in HPI  LABS:  HgA1c:   Lab Results   Component Value Date    HGBA1C 6 0 (H) 09/07/2018     BMP:   Lab Results   Component Value Date    GLUCOSE 107 (H) 04/04/2017    CALCIUM 9 8 04/04/2017     04/04/2017    K 4 7 06/13/2022    CO2 24 06/13/2022     06/13/2022    BUN 15 06/13/2022    CREATININE 0 82 06/13/2022         _____________________________________________________  PHYSICAL EXAMINATION:  Vital signs: /68   Ht 5' 1" (1 549 m)   Wt 79 1 kg (174 lb 6 4 oz)   BMI 32 95 kg/m²   General: well developed and well nourished, alert, oriented times 3 and appears comfortable  Psychiatric: Normal  HEENT: Trachea Midline, No torticollis  Cardiovascular: No discernable arrhythmia  Pulmonary: No wheezing or stridor  Abdomen: No rebound or guarding  Extremities: No peripheral edema  Skin: No Erythema  Neurovascular: Sensation Intact to the Median, Ulnar, Radial Nerve, Motor Intact to the Median, Ulnar, Radial Nerve and Pulses Intact    MUSCULOSKELETAL EXAMINATION:  RIGHT SIDE:  Finger:  Tenderness A1 pulley of index finger, Palpable clicking index finger and Nodules  index finger    _____________________________________________________  STUDIES REVIEWED:  No Studies to review      PROCEDURES PERFORMED:  Hand/upper extremity injection: R index A1  Universal Protocol:  Consent: Verbal consent obtained    Risks and benefits: risks, benefits and alternatives were discussed  Consent given by: patient  Site marked: the operative site was marked  Supporting Documentation  Indications: tendon swelling   Procedure Details  Condition:trigger finger Location: index finger - R index A1   Medications administered: 1 mL lidocaine 1 %; 6 mg betamethasone acetate-betamethasone sodium phosphate 6 (3-3) mg/mL    Patient tolerance: patient tolerated the procedure well with no immediate complications  Dressing:  Sterile dressing applied Scribe Attestation    I,:  Jaden Koenig am acting as a scribe while in the presence of the attending physician :       I,:  Gwendolyn Diaz MD personally performed the services described in this documentation    as scribed in my presence :

## 2022-06-27 NOTE — PATIENT INSTRUCTIONS
What is it TRIGGER FINGER? Stenosing tenosynovitis, commonly known as trigger finger or trigger thumb, involves the pulleys and tendons in the hand that bend the fingers  The tendons work like long ropes connecting the muscles of the forearm with the bones of the fingers and thumb  In the finger, the pulleys are a series of rings that form a tunnel through which the tendons must glide, much like the guides on a fishing nahed through which the line (or tendon) must pass  These pulleys hold the tendons close against the bone  The tendons and the tunnel have a slick lining that allows easy gliding of the tendon through the pulleys (see Figure 1)  Trigger finger/thumb occurs when the pulley at the base of the finger becomes too thick and constricting around the tendon, making it hard for the tendon to move freely through the pulley  Sometimes the tendon develops a nodule (knot) or swelling of its lining  Because of the increased resistance to the gliding of the tendon through the  pulley, one may feel pain, popping, or a catching feeling in the finger or thumb (see Figure 2)  When the tendon catches, it produces irritation and more swelling  This causes a vicious cycle of triggering, irritation, and swelling  Sometimes the finger becomes stuck or locked, and is hard to straighten or bend  What causes it? Causes for this condition are not always clear  Some trigger fingers are associated with medical conditions such as rheumatoid arthritis, gout, and diabetes  Local trauma to the palm/base of the finger may be a factor on occasion, but in most cases there is not a clear cause  Signs and symptoms   Trigger finger/thumb may start with discomfort felt at the base of the finger or thumb, where they join the palm  This area is often tender to local pressure  A nodule may sometimes be found in this area   When the finger begins to trigger or lock, the patient may think the  problem is at the middle knuckle of the finger or the tip knuckle of the thumb, since the tendon that is sticking is the one that moves these joints  Treatment  The goal of treatment in trigger finger/thumb is to eliminate the catching or locking and allow full movement of the finger or thumb without discomfort  Swelling around the flexor tendon and tendon sheath must be reduced to allow smooth gliding of the tendon  The wearing of a splint or taking an oral anti-inflammatory medication may sometimes  help  Treatment may also include changing activities to reduce swelling  An injection of steroid into the area around the tendon and pulley is often effective in relieving the trigger finger/thumb  If non-surgical forms of treatment do not relieve the symptoms, surgery may be recommended  This surgery is performed as an outpatient, usually with simple local anesthesia  The goal of surgery is to open the pulley at the base of the finger so that the tendon can glide more freely (see Figure 3)  Active motion of the finger generally begins immediately after surgery  Normal use of the hand can usually be resumed once comfort permits  Some patients may feel tenderness, discomfort, and swelling about the area of their surgery longer than others  Occasionally, hand therapy is required after surgery to regain  better use  © 2012 [employer]  www handcare  org     CORTICOSTEROID INJECTION  What is a corticosteroid? Injuries or disease such as arthritis, bursitis or tendonitis result in inflammation  In turn, this inflammation can cause swelling and pain  A local injection of a corticosteroid is provided to diminish inflammation  By doing so, it will also decrease pain and swelling which is making you uncomfortable  Is this the same thing as a Cortisone Injection? Cortisone® is a brand name of a corticosteroid used commonly in the past   Today I commonly use a more water-soluble corticosteroid named Celestone®      Will the injection hurt? As with any injection, you may feel pain at the time of the injection  Typically, I use a local anesthetic ([place]) in addition to the corticosteroid to determine if the injection has been placed in the appropriate location  Hence it is important to monitor your symptoms 4-6 hours after the injection, as the area will be anesthetized (numb) while the local anesthetic is working  Once the local anesthetic wears off, the intensity of pain can be the same as it was prior to the injection, or even worse  This does not mean that the injection is not working  The corticosteroid may take 24-72 hours to begin having a positive effect  If you do experience an increase in pain, the use of an ice pack on the area for 20 minutes at a time should help  It is also helpful to take an oral anti-inflammatory such as Tylenol® or Motrin® if you are able to medically do so  For this reason it is best to avoid activities that put stress on the area the first 24 hours after the injection  How long will pain relief last?  This will vary according to the type and severity of the symptoms being treated and the severity of the condition  Symptom relief may last weeks to months  I typically couple injections with physical therapy so that the underlying problem causing the inflammation may be treated as the pain diminishes  If the combination is not successful, you may be a surgical candidate  I have read bad things about steroids  Will these things happen to me? Corticosteroids, when utilized properly, are safe and effective drugs  When used in a low dose, potential adverse reactions are very rare  Some patients may experience a sensation of flushing for several days  Very rarely, there can be a local reaction which may include increased discomfort for a period of time in the areas that has been injected  A steroid should not be used over and over again    Multiple injections in the same area can produce adverse effects such as tissue atrophy and degeneration of tendon or cartilage  A small percentage of patients (< 0 1%) may develop an infection in the joint after injection  This is a treatable problem, but if neglected, may result in permanent disability  Signs of infection include redness, swelling, discharge, fevers, increasing pain and drainage from the injection site  This represents an emergency and you should contact our office immediately or seek treatment in the ER if after hours  If I have diabetes, will this injection affect me? If you are diabetic, an injection of a corticosteroid can raise your blood sugar level, requiring more insulin for a brief period of time  This may necessitate careful blood sugar maintenance  If the elevated sugars are not able to be controlled, contact your diabetic doctor for guidance

## 2022-06-27 NOTE — TELEPHONE ENCOUNTER
Dr Mendenhall Check: Ismael Chavez 08/22  #     Patient called to [facility] appt with dr Virgen Carrero on 08/22

## 2022-08-15 ENCOUNTER — TELEPHONE (OUTPATIENT)
Dept: OBGYN CLINIC | Facility: HOSPITAL | Age: 72
End: 2022-08-15

## 2022-08-29 ENCOUNTER — OFFICE VISIT (OUTPATIENT)
Dept: OBGYN CLINIC | Facility: CLINIC | Age: 72
End: 2022-08-29
Payer: COMMERCIAL

## 2022-08-29 VITALS
SYSTOLIC BLOOD PRESSURE: 140 MMHG | BODY MASS INDEX: 32.85 KG/M2 | HEIGHT: 61 IN | DIASTOLIC BLOOD PRESSURE: 82 MMHG | WEIGHT: 174 LBS

## 2022-08-29 DIAGNOSIS — M65.321 TRIGGER INDEX FINGER OF RIGHT HAND: Primary | ICD-10-CM

## 2022-08-29 PROCEDURE — 20550 NJX 1 TENDON SHEATH/LIGAMENT: CPT | Performed by: ORTHOPAEDIC SURGERY

## 2022-08-29 PROCEDURE — 99214 OFFICE O/P EST MOD 30 MIN: CPT | Performed by: ORTHOPAEDIC SURGERY

## 2022-08-29 PROCEDURE — 3079F DIAST BP 80-89 MM HG: CPT | Performed by: ORTHOPAEDIC SURGERY

## 2022-08-29 PROCEDURE — 1160F RVW MEDS BY RX/DR IN RCRD: CPT | Performed by: ORTHOPAEDIC SURGERY

## 2022-08-29 PROCEDURE — 3077F SYST BP >= 140 MM HG: CPT | Performed by: ORTHOPAEDIC SURGERY

## 2022-08-29 RX ORDER — LIDOCAINE HYDROCHLORIDE 10 MG/ML
1 INJECTION, SOLUTION INFILTRATION; PERINEURAL
Status: COMPLETED | OUTPATIENT
Start: 2022-08-29 | End: 2022-08-29

## 2022-08-29 RX ORDER — BETAMETHASONE SODIUM PHOSPHATE AND BETAMETHASONE ACETATE 3; 3 MG/ML; MG/ML
3 INJECTION, SUSPENSION INTRA-ARTICULAR; INTRALESIONAL; INTRAMUSCULAR; SOFT TISSUE
Status: COMPLETED | OUTPATIENT
Start: 2022-08-29 | End: 2022-08-29

## 2022-08-29 RX ADMIN — LIDOCAINE HYDROCHLORIDE 1 ML: 10 INJECTION, SOLUTION INFILTRATION; PERINEURAL at 10:38

## 2022-08-29 RX ADMIN — BETAMETHASONE SODIUM PHOSPHATE AND BETAMETHASONE ACETATE 3 MG: 3; 3 INJECTION, SUSPENSION INTRA-ARTICULAR; INTRALESIONAL; INTRAMUSCULAR; SOFT TISSUE at 10:38

## 2022-08-29 NOTE — PROGRESS NOTES
ASSESSMENT/PLAN:    Assessment:   Trigger Finger  right  index finger    Plan:   Patient will proceed with her second right index finger steroid injection  She has no formal restrictions  General Discussions:     Trigger FInger: The anatomy and physiology of trigger finger was discussed with the patient today in the office  Edema and increased contact pressure within the flexor tendons at the A1 pulley can cause pain, crepitation, and limitation of function  Treatment options include resting MP blocking splints to decrease edema, oral anti-inflammatory medications, home or formal therapy exercises, up to 2 steroid injections within the tendon sheath, or surgical release  While majority of patients do respond to conservative treatment, up to 20% may require surgical release  Operative Discussions:       _____________________________________________________  CHIEF COMPLAINT:  Chief Complaint   Patient presents with    Right Index Finger - Follow-up, Triggering     #1 injection 6/27/22         SUBJECTIVE:  Mingo Triplett is a 74 y o  female who presents for follow up regarding right index finger trigger finger, with persistent Catching and Locking to the right index finger  This started  6 month(s) ago as Insidious      Radiation: Yes to the  index finger  Previous Treatments: activity modification without relief, CSI with significant relief  Associated symptoms: No Complaints  Handedness: right    PAST MEDICAL HISTORY:  Past Medical History:   Diagnosis Date    Anxiety     Asthma     Atypical chest pain 6/11/2020    Cat bite of hand 6/29/2021    Disease of thyroid gland     GERD (gastroesophageal reflux disease)     Hearing loss     Heart murmur     Medicare annual wellness visit, subsequent 1/5/2021    Migraine     Peripheral vertigo     Raynaud disease     S/P trigger finger release     Sleep apnea     Visual disturbance        PAST SURGICAL HISTORY:  Past Surgical History: Procedure Laterality Date    CATARACT EXTRACTION      COLON SURGERY      EYE SURGERY  2019    GLAUCOMA SURGERY      HALLUX VALGUS CORRECTION Right 06/19/2007    INCISION TENDON SHEATH HAND  05/23/2011    Release of A1 pulley    KNEE ARTHROSCOPY W/ MENISCAL REPAIR Left 2003    KNEE SURGERY  2005    GA ESOPHAGOGASTRODUODENOSCOPY TRANSORAL DIAGNOSTIC N/A 8/22/2018    Procedure: ESOPHAGOGASTRODUODENOSCOPY (EGD); Surgeon: Dewey Garcia MD;  Location: [facility];  Service: Gastroenterology    GA INCISE FINGER TENDON SHEATH Left 7/11/2019    Procedure: RELEASE TRIGGER FINGER left ring;  Surgeon: Megan Garay MD;  Location: [facility];  Service: Orthopedics    TRIGGER FINGER RELEASE      TUBAL LIGATION  1977    UPPER GASTROINTESTINAL ENDOSCOPY         FAMILY HISTORY:  Family History   Problem Relation Age of Onset    Lung cancer Mother     Cancer Mother         80 Lung    Stomach cancer Father     Cancer Father         1998 Stomach    No Known Problems Sister     No Known Problems Daughter     Other Maternal Grandmother         Stroke syndrome    No Known Problems Maternal Grandfather     No Known Problems Paternal Grandmother     No Known Problems Paternal Grandfather     Other Maternal Aunt         Cardiomegaly    No Known Problems Paternal Aunt     No Known Problems Paternal Aunt     No Known Problems Paternal Aunt        SOCIAL HISTORY:  Social History     Tobacco Use    Smoking status: Never Smoker    Smokeless tobacco: Never Used   Vaping Use    Vaping Use: Never used   Substance Use Topics    Alcohol use:  Yes     Alcohol/week: 5 0 standard drinks     Types: 5 Cans of beer per week    Drug use: No       MEDICATIONS:    Current Outpatient Medications:     albuterol (2 5 mg/3 mL) 0 083 % nebulizer solution, Take 1 vial (2 5 mg total) by nebulization every 6 (six) hours as needed for wheezing or shortness of breath, Disp: 45 vial, Rfl: 0    albuterol (ProAir HFA) 90 mcg/act inhaler, Inhale 2 puffs every 6 (six) hours as needed for wheezing, Disp: 18 g, Rfl: 0    amLODIPine (NORVASC) 5 mg tablet, Take 1 tablet (5 mg total) by mouth daily, Disp: 90 tablet, Rfl: 3    aspirin 81 mg chewable tablet, Chew 1 tablet daily, Disp: , Rfl:     busPIRone (BUSPAR) 10 mg tablet, TAKE ONE TABLET BY MOUTH THREE TIMES A DAY, Disp: 90 tablet, Rfl: 0    cholecalciferol (VITAMIN D3) 1,000 units tablet, Take 1,000 Units by mouth daily, Disp: , Rfl:     citalopram (CeleXA) 40 mg tablet, Take 1 tablet (40 mg total) by mouth daily, Disp: 90 tablet, Rfl: 3    diphenoxylate-atropine (LOMOTIL) 2 5-0 025 mg per tablet, Take 1 tablet by mouth daily as needed (as needed), Disp: 30 tablet, Rfl: 0    Fluticasone Propionate, Inhal, (Flovent Diskus) 100 MCG/BLIST AEPB, Inhale 1 puff 2 (two) times a day Rinse mouth after use , Disp: 180 blister, Rfl: 3    Lactobacillus (Acidophilus) 100 MG CAPS, Take by mouth, Disp: , Rfl:     levothyroxine 25 mcg tablet, Take 1 tablet (25 mcg total) by mouth daily, Disp: 90 tablet, Rfl: 3    meloxicam (MOBIC) 15 mg tablet, Take 1 tablet (15 mg total) by mouth daily, Disp: 90 tablet, Rfl: 3    Multiple Vitamin (MULTIVITAMIN) tablet, Take 1 tablet by mouth daily, Disp: , Rfl:     omeprazole (PriLOSEC) 40 MG capsule, TAKE ONE CAPSULE BY MOUTH EVERY DAY, Disp: 90 capsule, Rfl: 0    pantoprazole (PROTONIX) 40 mg tablet, TAKE ONE TABLET BY MOUTH EVERY DAY, Disp: 90 tablet, Rfl: 0    penicillin V potassium (VEETID) 500 mg tablet, TAKE ONE TABLET BY MOUTH FOUR TIMES A DAY UNTIL FINISHED, Disp: , Rfl:     pravastatin (PRAVACHOL) 10 mg tablet, Take 1 tablet (10 mg total) by mouth daily at bedtime, Disp: 90 tablet, Rfl: 3    silver sulfadiazine (SILVADENE,SSD) 1 % cream, Apply topically daily, Disp: 50 g, Rfl: 0    ALLERGIES:  Allergies   Allergen Reactions    Pneumococcal Polysaccharide Vaccine Fever     Other reaction(s): Chills, fever and mottling  Category:  Allergy;    Boy Bob Allergic Rhinitis, Headache, Nasal Congestion and Visual Disturbance       REVIEW OF SYSTEMS:  Pertinent items are noted in HPI  LABS:  HgA1c:   Lab Results   Component Value Date    HGBA1C 6 0 (H) 09/07/2018     BMP:   Lab Results   Component Value Date    GLUCOSE 107 (H) 04/04/2017    CALCIUM 9 8 04/04/2017     04/04/2017    K 4 7 06/13/2022    CO2 24 06/13/2022     06/13/2022    BUN 15 06/13/2022    CREATININE 0 82 06/13/2022         _____________________________________________________  PHYSICAL EXAMINATION:  Vital signs: There were no vitals taken for this visit  General: well developed and well nourished, alert, oriented times 3 and appears comfortable  Psychiatric: Normal  HEENT: Trachea Midline, No torticollis  Cardiovascular: No discernable arrhythmia  Pulmonary: No wheezing or stridor  Abdomen: No rebound or guarding  Extremities: No peripheral edema  Skin: No Erythema  Neurovascular: Sensation Intact to the Median, Ulnar, Radial Nerve, Motor Intact to the Median, Ulnar, Radial Nerve and Pulses Intact    MUSCULOSKELETAL EXAMINATION:  RIGHT SIDE:  Finger:  Tenderness A1 pulley of index finger, Palpable clicking index finger and Nodules  index finger    _____________________________________________________  STUDIES REVIEWED:  No Studies to review      PROCEDURES PERFORMED:  Hand/upper extremity injection: R index A1  Universal Protocol:  Consent: Verbal consent obtained  Consent given by: patient    Supporting Documentation  Indications: pain   Procedure Details  Condition:trigger finger Location: index finger - R index A1   Needle size: 25 G  Approach: volar  Medications administered: 1 mL lidocaine 1 %; 3 mg betamethasone acetate-betamethasone sodium phosphate 6 (3-3) mg/mL    Patient tolerance: patient tolerated the procedure well with no immediate complications  Dressing:  Sterile dressing applied                   I interviewed, took the history and examined the patient    I discussed the case with the Resident and reviewed the Resident's note  I supervised the Resident and I agree with the Resident management plan as it was presented to me  I ws present in the clinic and examined the patient

## 2022-09-28 DIAGNOSIS — J45.20 MILD INTERMITTENT REACTIVE AIRWAY DISEASE WITHOUT COMPLICATION: ICD-10-CM

## 2022-09-29 RX ORDER — ALBUTEROL SULFATE 2.5 MG/3ML
2.5 SOLUTION RESPIRATORY (INHALATION) EVERY 6 HOURS PRN
Qty: 135 ML | Refills: 0 | Status: SHIPPED | OUTPATIENT
Start: 2022-09-29

## 2022-10-07 ENCOUNTER — TELEPHONE (OUTPATIENT)
Dept: DERMATOLOGY | Facility: CLINIC | Age: 72
End: 2022-10-07

## 2022-10-12 ENCOUNTER — OFFICE VISIT (OUTPATIENT)
Dept: DERMATOLOGY | Facility: CLINIC | Age: 72
End: 2022-10-12
Payer: COMMERCIAL

## 2022-10-12 VITALS — HEIGHT: 61 IN | WEIGHT: 168 LBS | BODY MASS INDEX: 31.72 KG/M2

## 2022-10-12 DIAGNOSIS — L82.1 SEBORRHEIC KERATOSIS: Primary | ICD-10-CM

## 2022-10-12 PROCEDURE — 99202 OFFICE O/P NEW SF 15 MIN: CPT | Performed by: DERMATOLOGY

## 2022-10-12 NOTE — PROGRESS NOTES
[facility] Note     Patient Name: Krysten Bowling  Encounter Date: 10/12/2022    • Have you been cared for by a [facility] in the last 3 years and, if so, which one? No    · Have you traveled outside of the [address] [place] [address] in the past 3 months or outside of the [facility] area in the last 2 weeks? No    • May we call your Preferred Phone number to discuss your specific medical information? Yes    • May we leave a detailed message that includes your specific medical information? Yes      Today's Chief Concerns:  • Concern #1:  Spot of concern right temple       Past Medical History:  Have you personally ever had or currently have any of the following? · Skin cancer (such as Melanoma, Basal Cell Carcinoma, Squamous Cell Carcinoma? (If Yes, please provide more detail)- No  · Eczema: No  · Psoriasis: No  · HIV/AIDS: No  · Hepatitis B or C: No  · Tuberculosis: No  · Systemic Immunosuppression such as Diabetes, Biologic or Immunotherapy, Chemotherapy, Organ Transplantation, Bone Marrow Transplantation (If YES, please provide more detail): No  · Radiation Treatment (If YES, please provide more detail): No  · Any other major medical conditions/concerns? (If Yes, which types)- No    Social History:    • What is/was your primary occupation? Retired     • What are your hobbies/past-times? Reading     Family History:  Have any of your "first degree relatives" (parent, brother, sister, or child) had any of the following       · Skin cancer such as Melanoma or Merkel Cell Carcinoma or Pancreatic Cancer? No  · Eczema, Asthma, Hay Fever or Seasonal Allergies: No  · Psoriasis or Psoriatic Arthritis: No  · Do any other medical conditions seem to run in your family? If Yes, what condition and which relatives?   No    Current Medications:     Current Outpatient Medications:   •  albuterol (2 5 mg/3 mL) 0 083 % nebulizer solution, Take 3 mL (2 5 mg total) by nebulization every 6 (six) hours as needed for wheezing or shortness of breath, Disp: 135 mL, Rfl: 0  •  albuterol (ProAir HFA) 90 mcg/act inhaler, Inhale 2 puffs every 6 (six) hours as needed for wheezing, Disp: 18 g, Rfl: 0  •  amLODIPine (NORVASC) 5 mg tablet, Take 1 tablet (5 mg total) by mouth daily, Disp: 90 tablet, Rfl: 3  •  aspirin 81 mg chewable tablet, Chew 1 tablet daily, Disp: , Rfl:   •  busPIRone (BUSPAR) 10 mg tablet, TAKE ONE TABLET BY MOUTH THREE TIMES A DAY, Disp: 90 tablet, Rfl: 0  •  cholecalciferol (VITAMIN D3) 1,000 units tablet, Take 1,000 Units by mouth daily, Disp: , Rfl:   •  citalopram (CeleXA) 40 mg tablet, Take 1 tablet (40 mg total) by mouth daily, Disp: 90 tablet, Rfl: 3  •  diphenoxylate-atropine (LOMOTIL) 2 5-0 025 mg per tablet, Take 1 tablet by mouth daily as needed (as needed), Disp: 30 tablet, Rfl: 0  •  Fluticasone Propionate, Inhal, (Flovent Diskus) 100 MCG/BLIST AEPB, Inhale 1 puff 2 (two) times a day Rinse mouth after use , Disp: 180 blister, Rfl: 3  •  Lactobacillus (Acidophilus) 100 MG CAPS, Take by mouth, Disp: , Rfl:   •  levothyroxine 25 mcg tablet, Take 1 tablet (25 mcg total) by mouth daily, Disp: 90 tablet, Rfl: 3  •  Multiple Vitamin (MULTIVITAMIN) tablet, Take 1 tablet by mouth daily, Disp: , Rfl:   •  omeprazole (PriLOSEC) 40 MG capsule, TAKE ONE CAPSULE BY MOUTH EVERY DAY, Disp: 90 capsule, Rfl: 0  •  pantoprazole (PROTONIX) 40 mg tablet, TAKE ONE TABLET BY MOUTH EVERY DAY, Disp: 90 tablet, Rfl: 0  •  pravastatin (PRAVACHOL) 10 mg tablet, Take 1 tablet (10 mg total) by mouth daily at bedtime, Disp: 90 tablet, Rfl: 3  •  silver sulfadiazine (SILVADENE,SSD) 1 % cream, Apply topically daily, Disp: 50 g, Rfl: 0  •  meloxicam (MOBIC) 15 mg tablet, Take 1 tablet (15 mg total) by mouth daily (Patient not taking: Reported on 10/12/2022), Disp: 90 tablet, Rfl: 3  •  penicillin V potassium (VEETID) 500 mg tablet, TAKE ONE TABLET BY MOUTH FOUR TIMES A DAY UNTIL FINISHED (Patient not taking: Reported on 10/12/2022), Disp: , Rfl:       Review of Systems:  Have you recently had or currently have any of the following? If YES, what are you doing for the problem? · Fever, chills or unintended weight loss: No  · Sudden loss or change in your vision: No  · Nausea, vomiting or blood in your stool: No  · Painful or swollen joints: No  · Wheezing or cough: No  · Changing mole or non-healing wound: No  · Nosebleeds: No  · Excessive sweating: No  · Easy or prolonged bleeding? No  · Over the last 2 weeks, how often have you been bothered by the following problems? · Taking little interest or pleasure in doing things: 1 - Not at All  · Feeling down, depressed, or hopeless: 1 - Not at All  · Rapid heartbeat with epinephrine:  No    · FEMALES ONLY:    · Are you pregnant or planning to become pregnant? No  · Are you currently or planning to be nursing or breast feeding? No    · Any known allergies? Allergies   Allergen Reactions   • Pneumococcal Polysaccharide Vaccine Fever     Other reaction(s): Chills, fever and mottling  Category: Allergy;    • Corrie Allergic Rhinitis, Headache, Nasal Congestion and Visual Disturbance         Physical Exam:    • Was a chaperone (Derm Clinical Assistant) present throughout the entire Physical Exam? Yes    • Did the Dermatology Team specifically  the patient on the importance of a Full Skin Exam to be sure that nothing is missed clinically?  Yes}  o Did the patient ultimately request or accept a Full Skin Exam?  NO  o Did the patient specifically refuse to have the areas "under-the-bra" examined by the Dermatologist? No  o Did the patient specifically refuse to have the areas "under-the-underwear" examined by the Dermatologist? No    CONSTITUTIONAL:   Vitals:    10/12/22 1429   Weight: 76 2 kg (168 lb)   Height: 5' 1" (1 549 m)         PSYCH: Normal mood and affect  EYES: Normal conjunctiva  ENT: Normal lips and oral mucosa  CARDIOVASCULAR: No edema  RESPIRATORY: Normal respirations  HEME/LYMPH/IMMUNO:  No regional lymphadenopathy except as noted below in "ASSESSMENT AND PLAN BY DIAGNOSIS"    SKIN:  FULL ORGAN SYSTEM EXAM   Hair, Scalp, Ears, Face Normal except as noted below in Assessment   Neck, Cervical Chain Nodes Normal except as noted below in Assessment   Right Arm/Hand/Fingers Normal except as noted below in Assessment   Left Arm/Hand/Fingers Normal except as noted below in Assessment   Chest/Breasts/Axillae Viewed areas Normal except as noted below in Assessment   Abdomen, Umbilicus Normal except as noted below in Assessment   Back/Spine Normal except as noted below in Assessment   Groin/Genitalia/Buttocks Normal except as noted below in Assessment   Right Leg, Foot, Toes Normal except as noted below in Assessment   Left Leg, Foot, Toes Normal except as noted below in Assessment        Assessment and Plan by Diagnosis:    History of Present Condition:    • Duration:  How long has this been an issue for you?    o  month   • Location Affected:  Where on the body is this affecting you?    o  Right temple   • Quality:  Is there any bleeding, pain, itch, burning/irritation, or redness associated with the skin lesion?    o  Itching   • Severity:  Describe any bleeding, pain, itch, burning/irritation, or redness on a scale of 1 to 10 (with 10 being the worst)  o  2  • Timing:  Does this condition seem to be there pretty constantly or do you notice it more at specific times throughout the day?    o  Denies  • Context:  Have you ever noticed that this condition seems to be associated with specific activities you do?    o  Denies  • Modifying Factors:    o Anything that seems to make the condition worse?    -  Denies  o What have you tried to do to make the condition better?     -  Denies  • Associated Signs and Symptoms:  Does this skin lesion seem to be associated with any of the following:  o  SL AMB DERM SIGNS AND SYMPTOMS: Itching and Scratching 1  SEBORRHEIC KERATOSIS; NON-INFLAMED    Physical Exam:  • Anatomic Location Affected:  Scattered  • Morphological Description:  Flat and raised, waxy, smooth to warty textured, yellow to brownish-grey to dark brown to blackish, discrete, "stuck-on" appearing papules  • Pertinent Positives:  • Pertinent Negatives: Additional History of Present Condition:  Patient reports new bumps on the skin  Denies itch, burn, pain, bleeding or ulceration  Present constantly; nothing seems to make it worse or better  No prior treatment  Assessment and Plan:  Based on a thorough discussion of this condition and the management approach to it (including a comprehensive discussion of the known risks, side effects and potential benefits of treatment), the patient (family) agrees to implement the following specific plan:  • Reassured benign     Seborrheic Keratosis  A seborrheic keratosis is a harmless warty spot that appears during adult life as a common sign of skin aging  Seborrheic keratoses can arise on any area of skin, covered or uncovered, with the usual exception of the palms and soles  They do not arise from mucous membranes  Seborrheic keratoses can have highly variable appearance  Seborrheic keratoses are extremely common  It has been estimated that over 90% of adults over the age of 61 years have one or more of them  They occur in males and females of all races, typically beginning to erupt in the 35s or 45s  They are uncommon under the age of 21 years  The precise cause of seborrhoeic keratoses is not known  Seborrhoeic keratoses are considered degenerative in nature  As time goes by, seborrheic keratoses tend to become more numerous  Some people inherit a tendency to develop a very large number of them; some people may have hundreds of them      The name "seborrheic keratosis" is misleading, because these lesions are not limited to a seborrhoeic distribution (scalp, mid-face, chest, upper back), nor are they formed from sebaceous glands, nor are they associated with sebum -- which is greasy  Seborrheic keratosis may also be called "SK," "Seb K," "basal cell papilloma," "senile wart," or "barnacle "      Researchers have noted:  • Eruptive seborrhoeic keratoses can follow sunburn or dermatitis  • Skin friction may be the reason they appear in body folds  • Viral cause (e g , human papillomavirus) seems unlikely  • Stable and clonal mutations or activation of FRFR3, PIK3CA, IMELAD, AKT1 and EGFR genes are found in seborrhoeic keratoses  • Seborrhoeic keratosis can arise from solar lentigo  • FRFR3 mutations also arise in solar lentigines  These mutations are associated with increased age and location on the head and neck, suggesting a role of ultraviolet radiation in these lesions  • Seborrheic keratoses do not harbour tumour suppressor gene mutations  • Epidermal growth factor receptor inhibitors, which are used to treat some cancers, often result in an increase in verrucal (warty) keratoses  There is no easy way to remove multiple lesions on a single occasion  Unless a specific lesion is "inflamed" and is causing pain or stinging/burning or is bleeding, most insurance companies do not authorize treatment        Scribe Attestation    I,:  Dmitriy Steven am acting as a scribe while in the presence of the attending physician :       I,:  Lila Sorto MD personally performed the services described in this documentation    as scribed in my presence :

## 2022-10-12 NOTE — PATIENT INSTRUCTIONS
1  SEBORRHEIC KERATOSIS; NON-INFLAMED    Assessment and Plan:  Based on a thorough discussion of this condition and the management approach to it (including a comprehensive discussion of the known risks, side effects and potential benefits of treatment), the patient (family) agrees to implement the following specific plan:  Reassured benign     Seborrheic Keratosis  A seborrheic keratosis is a harmless warty spot that appears during adult life as a common sign of skin aging  Seborrheic keratoses can arise on any area of skin, covered or uncovered, with the usual exception of the palms and soles  They do not arise from mucous membranes  Seborrheic keratoses can have highly variable appearance  Seborrheic keratoses are extremely common  It has been estimated that over 90% of adults over the age of 61 years have one or more of them  They occur in males and females of all races, typically beginning to erupt in the 35s or 45s  They are uncommon under the age of 21 years  The precise cause of seborrhoeic keratoses is not known  Seborrhoeic keratoses are considered degenerative in nature  As time goes by, seborrheic keratoses tend to become more numerous  Some people inherit a tendency to develop a very large number of them; some people may have hundreds of them  The name "seborrheic keratosis" is misleading, because these lesions are not limited to a seborrhoeic distribution (scalp, mid-face, chest, upper back), nor are they formed from sebaceous glands, nor are they associated with sebum -- which is greasy    Seborrheic keratosis may also be called "SK," "Seb K," "basal cell papilloma," "senile wart," or "barnacle "      Researchers have noted:  Eruptive seborrhoeic keratoses can follow sunburn or dermatitis  Skin friction may be the reason they appear in body folds  Viral cause (e g , human papillomavirus) seems unlikely  Stable and clonal mutations or activation of FRFR3, PIK3CA, IMELDA, AKT1 and EGFR genes are found in seborrhoeic keratoses  Seborrhoeic keratosis can arise from solar lentigo  FRFR3 mutations also arise in solar lentigines  These mutations are associated with increased age and location on the head and neck, suggesting a role of ultraviolet radiation in these lesions  Seborrheic keratoses do not harbour tumour suppressor gene mutations  Epidermal growth factor receptor inhibitors, which are used to treat some cancers, often result in an increase in verrucal (warty) keratoses  There is no easy way to remove multiple lesions on a single occasion  Unless a specific lesion is "inflamed" and is causing pain or stinging/burning or is bleeding, most insurance companies do not authorize treatment

## 2022-10-14 DIAGNOSIS — M94.0 COSTOCHONDRITIS: ICD-10-CM

## 2022-10-14 RX ORDER — MELOXICAM 15 MG/1
15 TABLET ORAL DAILY
Qty: 90 TABLET | Refills: 0 | Status: SHIPPED | OUTPATIENT
Start: 2022-10-14

## 2022-12-21 DIAGNOSIS — M94.0 COSTOCHONDRITIS: ICD-10-CM

## 2022-12-21 RX ORDER — MELOXICAM 15 MG/1
TABLET ORAL
Qty: 90 TABLET | Refills: 0 | Status: SHIPPED | OUTPATIENT
Start: 2022-12-21

## 2023-01-13 LAB
ALBUMIN SERPL-MCNC: 4.8 G/DL (ref 3.7–4.7)
ALBUMIN/GLOB SERPL: 2 {RATIO} (ref 1.2–2.2)
ALP SERPL-CCNC: 54 IU/L (ref 44–121)
ALT SERPL-CCNC: 18 IU/L (ref 0–32)
AST SERPL-CCNC: 25 IU/L (ref 0–40)
BILIRUB SERPL-MCNC: 0.4 MG/DL (ref 0–1.2)
BUN SERPL-MCNC: 16 MG/DL (ref 8–27)
BUN/CREAT SERPL: 21 (ref 12–28)
CALCIUM SERPL-MCNC: 10 MG/DL (ref 8.7–10.3)
CHLORIDE SERPL-SCNC: 100 MMOL/L (ref 96–106)
CHOLEST SERPL-MCNC: 180 MG/DL (ref 100–199)
CO2 SERPL-SCNC: 24 MMOL/L (ref 20–29)
CREAT SERPL-MCNC: 0.76 MG/DL (ref 0.57–1)
EGFR: 82 ML/MIN/1.73
ERYTHROCYTE [DISTWIDTH] IN BLOOD BY AUTOMATED COUNT: 12.1 % (ref 11.7–15.4)
GLOBULIN SER-MCNC: 2.4 G/DL (ref 1.5–4.5)
GLUCOSE SERPL-MCNC: 108 MG/DL (ref 70–99)
HCT VFR BLD AUTO: 40.4 % (ref 34–46.6)
HDLC SERPL-MCNC: 61 MG/DL
HGB BLD-MCNC: 13.4 G/DL (ref 11.1–15.9)
LDLC SERPL CALC-MCNC: 101 MG/DL (ref 0–99)
LDLC/HDLC SERPL: 1.7 RATIO (ref 0–3.2)
MCH RBC QN AUTO: 30.5 PG (ref 26.6–33)
MCHC RBC AUTO-ENTMCNC: 33.2 G/DL (ref 31.5–35.7)
MCV RBC AUTO: 92 FL (ref 79–97)
PLATELET # BLD AUTO: 282 X10E3/UL (ref 150–450)
POTASSIUM SERPL-SCNC: 4.6 MMOL/L (ref 3.5–5.2)
PROT SERPL-MCNC: 7.2 G/DL (ref 6–8.5)
RBC # BLD AUTO: 4.4 X10E6/UL (ref 3.77–5.28)
SL AMB VLDL CHOLESTEROL CALC: 18 MG/DL (ref 5–40)
SODIUM SERPL-SCNC: 140 MMOL/L (ref 134–144)
TRIGL SERPL-MCNC: 99 MG/DL (ref 0–149)
TSH SERPL DL<=0.005 MIU/L-ACNC: 3.67 UIU/ML (ref 0.45–4.5)
WBC # BLD AUTO: 7 X10E3/UL (ref 3.4–10.8)

## 2023-01-16 ENCOUNTER — TELEPHONE (OUTPATIENT)
Dept: GASTROENTEROLOGY | Facility: CLINIC | Age: 73
End: 2023-01-16

## 2023-01-16 NOTE — TELEPHONE ENCOUNTER
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

## 2023-01-17 ENCOUNTER — PREP FOR PROCEDURE (OUTPATIENT)
Dept: GASTROENTEROLOGY | Facility: CLINIC | Age: 73
End: 2023-01-17

## 2023-01-17 ENCOUNTER — TELEPHONE (OUTPATIENT)
Dept: GASTROENTEROLOGY | Facility: CLINIC | Age: 73
End: 2023-01-17

## 2023-01-17 DIAGNOSIS — Z12.11 SPECIAL SCREENING FOR MALIGNANT NEOPLASMS, COLON: Primary | ICD-10-CM

## 2023-01-17 NOTE — TELEPHONE ENCOUNTER
Scheduled date of colonoscopy (as of today): 04/05/2023  Physician performing colonoscopy: Dr Pasquale Anderson   Location of colonoscopy: [facility]  Bowel prep reviewed with patient: gerry La   Instructions reviewed with patient by: Tommy Lino   Clearances: N/A

## 2023-01-17 NOTE — TELEPHONE ENCOUNTER
01/17/23  Screened by: Cristy Garrison    Referring Provider Darleen Rose    Pre- Screening: There is no height or weight on file to calculate BMI  Has patient been referred for a routine screening Colonoscopy? Yes    Is the patient between 39-70 years old? yes      Previous Colonoscopy yes   If yes:    Date: 10 years    Facility:     Reason:       SCHEDULING STAFF: If the patient is between 45yrs-49yrs, please advise patient to confirm benefits/coverage with their insurance company for a routine screening colonoscopy, some insurance carriers will only cover at [address] or older  If the patient is over 66years old, please schedule an office visit  Does the patient want to see a Gastroenterologist prior to their procedure OR are they having any GI symptoms? no    Has the patient been hospitalized or had abdominal surgery in the past 6 months? no    Does the patient use supplemental oxygen? no    Does the patient take Coumadin, Lovenox, Plavix, Elliquis, Xarelto, or other blood thinning medication? no    Has the patient had a stroke, cardiac event, or stent placed in the past year? no    SCHEDULING STAFF: If patient answers NO to above questions, then schedule procedure  If patient answers YES to above questions, then schedule office appointment  If patient is between 45yrs - 49yrs, please advise patient that we will have to confirm benefits & coverage with their insurance company for a routine screening colonoscopy

## 2023-01-19 NOTE — TELEPHONE ENCOUNTER
Spoke with pt questioning the difference between the golytely and miralax/dulcolax bowel preps  All questions addressed to pt's satisfaction   Dulcolax/miralax instructions sent via [employer] as per pt request

## 2023-01-20 ENCOUNTER — OFFICE VISIT (OUTPATIENT)
Dept: FAMILY MEDICINE CLINIC | Facility: HOSPITAL | Age: 73
End: 2023-01-20

## 2023-01-20 VITALS
SYSTOLIC BLOOD PRESSURE: 144 MMHG | DIASTOLIC BLOOD PRESSURE: 74 MMHG | BODY MASS INDEX: 33.08 KG/M2 | TEMPERATURE: 96.6 F | HEIGHT: 61 IN | HEART RATE: 52 BPM | WEIGHT: 175.2 LBS

## 2023-01-20 DIAGNOSIS — M94.0 COSTOCHONDRITIS: ICD-10-CM

## 2023-01-20 DIAGNOSIS — I10 PRIMARY HYPERTENSION: Primary | ICD-10-CM

## 2023-01-20 DIAGNOSIS — F41.9 ANXIETY: ICD-10-CM

## 2023-01-20 DIAGNOSIS — E03.9 HYPOTHYROIDISM, UNSPECIFIED TYPE: ICD-10-CM

## 2023-01-20 DIAGNOSIS — R73.01 IMPAIRED FASTING GLUCOSE: ICD-10-CM

## 2023-01-20 DIAGNOSIS — E78.49 OTHER HYPERLIPIDEMIA: ICD-10-CM

## 2023-01-20 DIAGNOSIS — E03.9 ACQUIRED HYPOTHYROIDISM: ICD-10-CM

## 2023-01-20 PROBLEM — S46.812A STRAIN OF LEFT TRAPEZIUS MUSCLE: Status: RESOLVED | Noted: 2019-07-19 | Resolved: 2023-01-20

## 2023-01-20 NOTE — PROGRESS NOTES
Name: Tianna Bone      :       MRN: [id number]  Encounter Provider: Osmani Russell MD  Encounter Date: 2023   [facility]      1  Primary hypertension    2  Other hyperlipidemia    3  Acquired hypothyroidism    4  Impaired fasting glucose    5  Anxiety    6  Costochondritis     patient is doing well  Reviewed labs  coitninue with the same medications  Discussed regular exercise  Fu in 6 months  Subjective      Here for fu  Overall doing well  For a while now she does get intermittent pain related to costochondritis  Improved with nsaids  Doing well with medications  Had recent labs done  Review of Systems   Constitutional: Negative  Negative for activity change, appetite change, chills, diaphoresis, fatigue and fever  HENT: Negative for congestion, facial swelling and sore throat  Respiratory: Negative  Negative for apnea, cough, chest tightness and shortness of breath  Cardiovascular: Negative  Negative for chest pain and palpitations  Gastrointestinal: Negative  Negative for abdominal distention, abdominal pain, blood in stool, constipation, diarrhea and nausea  Genitourinary: Negative  Negative for difficulty urinating, dysuria, flank pain and frequency         Current Outpatient Medications on File Prior to Visit   Medication Sig   • albuterol (2 5 mg/3 mL) 0 083 % nebulizer solution Take 3 mL (2 5 mg total) by nebulization every 6 (six) hours as needed for wheezing or shortness of breath   • albuterol (ProAir HFA) 90 mcg/act inhaler Inhale 2 puffs every 6 (six) hours as needed for wheezing   • amLODIPine (NORVASC) 5 mg tablet Take 1 tablet (5 mg total) by mouth daily   • aspirin 81 mg chewable tablet Chew 1 tablet daily   • busPIRone (BUSPAR) 10 mg tablet TAKE ONE TABLET BY MOUTH THREE TIMES A DAY   • cholecalciferol (VITAMIN D3) 1,000 units tablet Take 1,000 Units by mouth daily   • citalopram (CeleXA) 40 mg tablet Take 1 tablet (40 mg total) by mouth daily   • diphenoxylate-atropine (LOMOTIL) 2 5-0 025 mg per tablet Take 1 tablet by mouth daily as needed (as needed)   • Fluticasone Propionate, Inhal, (Flovent Diskus) 100 MCG/BLIST AEPB Inhale 1 puff 2 (two) times a day Rinse mouth after use  • Lactobacillus (Acidophilus) 100 MG CAPS Take by mouth   • levothyroxine 25 mcg tablet Take 1 tablet (25 mcg total) by mouth daily   • meloxicam (MOBIC) 15 mg tablet TAKE 1 TABLET BY MOUTH  DAILY   • Multiple Vitamin (MULTIVITAMIN) tablet Take 1 tablet by mouth daily   • omeprazole (PriLOSEC) 40 MG capsule TAKE ONE CAPSULE BY MOUTH EVERY DAY   • pantoprazole (PROTONIX) 40 mg tablet TAKE ONE TABLET BY MOUTH EVERY DAY   • pravastatin (PRAVACHOL) 10 mg tablet Take 1 tablet (10 mg total) by mouth daily at bedtime   • silver sulfadiazine (SILVADENE,SSD) 1 % cream Apply topically daily   • [DISCONTINUED] penicillin V potassium (VEETID) 500 mg tablet TAKE ONE TABLET BY MOUTH FOUR TIMES A DAY UNTIL FINISHED (Patient not taking: Reported on 10/12/2022)       Objective     /74   Pulse (!) 52   Temp (!) 96 6 °F (35 9 °C)   Ht 5' 1" (1 549 m)   Wt 79 5 kg (175 lb 3 2 oz)   BMI 33 10 kg/m²     Physical Exam  Vitals and nursing note reviewed  Constitutional:       Appearance: Normal appearance  She is well-developed  HENT:      Head: Normocephalic and atraumatic  Right Ear: Tympanic membrane, ear canal and external ear normal       Left Ear: Tympanic membrane, ear canal and external ear normal       Nose: Nose normal       Mouth/Throat:      Mouth: Mucous membranes are moist    Eyes:      Conjunctiva/sclera: Conjunctivae normal       Pupils: Pupils are equal, round, and reactive to light  Neck:      Thyroid: No thyromegaly  Trachea: No tracheal deviation  Cardiovascular:      Rate and Rhythm: Normal rate and regular rhythm  Heart sounds: Normal heart sounds  No murmur heard  Pulmonary:      Effort: Pulmonary effort is normal  No respiratory distress  Breath sounds: Normal breath sounds  No wheezing  Abdominal:      General: Bowel sounds are normal       Palpations: Abdomen is soft  Musculoskeletal:         General: Normal range of motion  Cervical back: Normal range of motion and neck supple  Skin:     General: Skin is warm and dry  Capillary Refill: Capillary refill takes less than 2 seconds  Neurological:      General: No focal deficit present  Mental Status: She is alert and oriented to person, place, and time  Psychiatric:         Mood and Affect: Mood normal          Behavior: Behavior normal          Thought Content:  Thought content normal          Judgment: Judgment normal        Miquel Chan MD

## 2023-01-20 NOTE — TELEPHONE ENCOUNTER
Pt not scheduled until April for colonoscopy  Rx [facility]  Probably need to try locally and/or change rx

## 2023-02-03 NOTE — TELEPHONE ENCOUNTER
Spoke to patient  I see previously [facility] had discussed miralax/dulcolax prep with patient and patient is agreeable to doing this prep instead  I will email her instructions to [email or web address]  Instructions sent

## 2023-02-28 DIAGNOSIS — Z12.11 SCREEN FOR COLON CANCER: Primary | ICD-10-CM

## 2023-03-05 DIAGNOSIS — M94.0 COSTOCHONDRITIS: ICD-10-CM

## 2023-03-05 RX ORDER — MELOXICAM 15 MG/1
TABLET ORAL
Qty: 90 TABLET | Refills: 0 | Status: SHIPPED | OUTPATIENT
Start: 2023-03-05 | End: 2023-03-06 | Stop reason: SDUPTHER

## 2023-03-06 ENCOUNTER — TELEPHONE (OUTPATIENT)
Dept: FAMILY MEDICINE CLINIC | Facility: HOSPITAL | Age: 73
End: 2023-03-06

## 2023-03-06 DIAGNOSIS — M94.0 COSTOCHONDRITIS: ICD-10-CM

## 2023-03-06 RX ORDER — MELOXICAM 15 MG/1
15 TABLET ORAL DAILY
Qty: 90 TABLET | Refills: 0 | Status: SHIPPED | OUTPATIENT
Start: 2023-03-06

## 2023-03-06 NOTE — TELEPHONE ENCOUNTER
meloxicam (MOBIC) 15 mg tablet    Please cancel this out if it's still at [facility]  She will be getting thru mail order (already on it's way)  Please use mail order from now on

## 2023-03-06 NOTE — TELEPHONE ENCOUNTER
Medication Refill Request     Name meloxicam (MOBIC) 15 mg tablet  Dose/Frequency TAKE 1 TABLET BY MOUTH DAILY  Quantity 90 tablet   Verified pharmacy   [x]  Verified ordering Provider   [x]  Does patient have enough for the next 3 days? Yes [] No [x]    Patient stated that she would like medication sent to local pharmacy not mail order

## 2023-04-03 ENCOUNTER — NURSE TRIAGE (OUTPATIENT)
Dept: OTHER | Facility: OTHER | Age: 73
End: 2023-04-03

## 2023-04-03 NOTE — TELEPHONE ENCOUNTER
Regarding: Colonoscopy prep question  ----- Message from Arslan Valerio sent at 4/3/2023 10:04 AM EDT -----  I am scheduled for a colonoscopy on 4/5/23 and I have questions regarding the prep and when to start taking it

## 2023-04-03 NOTE — TELEPHONE ENCOUNTER
" Pt calling again, she would like to stick with appt time of 10:00  Pt is currently scheduled for procedure at 10:00  She is wondering is she could be scheduled for around 12 or 1300? If not she will stick with the 10:00 appt time  Reason for Disposition  • Colonoscopy, questions about    Answer Assessment - Initial Assessment Questions  1  DATE/TIME: \"When did you have your colonoscopy? \"       04/05/2023  2  MAIN CONCERN: Anice Prost is your main concern right now? \" \"What questions do you have? \"      I'm wondering if I can have my procedure a little later in the day?     Protocols used: COLONOSCOPY SYMPTOMS AND QUESTIONS-ADULT-    "

## 2023-04-05 ENCOUNTER — TELEPHONE (OUTPATIENT)
Dept: OTHER | Facility: OTHER | Age: 73
End: 2023-04-05

## 2023-04-05 NOTE — TELEPHONE ENCOUNTER
I called and spoke with patient  She has a torturous time with the dulcolax prep  She inquired about Sutab  I explained Jeison Parish is not covered by many insurances and can be as expensive as 150-$200  We should be able to provide her with a hard copy of a Sutabprescription and coupon card that she can take to any pharmacy of her choice  With the coupon card it should bring the cost down to approx $40  She is not yet ready to reschedule at this time  She will contact us in the near future

## 2023-04-05 NOTE — TELEPHONE ENCOUNTER
Patient is calling regarding cancelling a Colonoscopy procedure      Date/Time: 04/05/23 at 0900    Performing Physician: Dr Soledad Viveros Supervisor Notified?:  Jazmyne Kaplan [] NO [x]    Patient requesting call back to reschedule: YES [x] NO []

## 2023-04-05 NOTE — TELEPHONE ENCOUNTER
Patient called saying that she is returning a phone that she had got from the office regarding rescheduling her colonoscopy  Patient is asking if the office can give her back a call because she has question for the nurse  no

## 2023-06-02 ENCOUNTER — OFFICE VISIT (OUTPATIENT)
Dept: PODIATRY | Facility: CLINIC | Age: 73
End: 2023-06-02

## 2023-06-02 ENCOUNTER — APPOINTMENT (OUTPATIENT)
Dept: RADIOLOGY | Facility: CLINIC | Age: 73
End: 2023-06-02
Payer: COMMERCIAL

## 2023-06-02 VITALS
HEART RATE: 76 BPM | HEIGHT: 61 IN | SYSTOLIC BLOOD PRESSURE: 165 MMHG | BODY MASS INDEX: 33.04 KG/M2 | WEIGHT: 175 LBS | DIASTOLIC BLOOD PRESSURE: 73 MMHG

## 2023-06-02 DIAGNOSIS — M77.42 METATARSALGIA, LEFT FOOT: ICD-10-CM

## 2023-06-02 DIAGNOSIS — M76.72 PERONEAL TENDONITIS, LEFT: ICD-10-CM

## 2023-06-02 DIAGNOSIS — S93.602A FOOT SPRAIN, LEFT, INITIAL ENCOUNTER: Primary | ICD-10-CM

## 2023-06-02 DIAGNOSIS — M72.2 PLANTAR FASCIITIS OF LEFT FOOT: ICD-10-CM

## 2023-06-02 PROCEDURE — 73630 X-RAY EXAM OF FOOT: CPT

## 2023-06-02 RX ORDER — METHYLPREDNISOLONE 4 MG/1
TABLET ORAL
Qty: 1 EACH | Refills: 0 | Status: SHIPPED | OUTPATIENT
Start: 2023-06-02 | End: 2023-06-02 | Stop reason: CLARIF

## 2023-06-02 RX ORDER — METHYLPREDNISOLONE 4 MG/1
TABLET ORAL
Qty: 1 EACH | Refills: 0 | Status: SHIPPED | OUTPATIENT
Start: 2023-06-02

## 2023-06-02 NOTE — PROGRESS NOTES
Patient ID: Fabi Lowe is a 74 y o  female Date of Birth 1950       Chief Complaint   Patient presents with   • Foot Pain     Left foot  Pt states the pain in on the bottom of the foot              Diagnosis:  1  Foot sprain, left, initial encounter  -     Brace  -     methylPREDNISolone 4 MG tablet therapy pack; Use as directed on package    2  Plantar fasciitis of left foot  -     Ambulatory Referral to Physical Therapy; Future    3  Peroneal tendonitis, left  -     Ambulatory Referral to Physical Therapy; Future    4  Metatarsalgia, left foot  -     XR foot 3+ vw left; Future; Expected date: 06/02/2023      1  Initial pedal examination with socks and shoes removed bilaterally  2   Today we discussed the etiology of her left foot pain, I explained to her she has metatarsalgia from hallux limitus and her change in shoe gear to accommodate that is what triggered a plantar fasciitis and then compensation of that is what then led to posterior tibial tendinitis  3   At this time I have placed patient in an ASO left foot, she is to wear this for 2 weeks during all waking hours, she may remove it to sleep, shower and when she is resting  After 2 weeks she may taper use of the ASO based on her discomfort  4   As patient is having extreme pain where she cannot ambulate I have prescribed her a Medrol Dosepak  5  patient was prescribed physical therapy to help with her acute pain and tendinitis  6   Patient referred to the [employer] for new sneakers, she is advised to wait until the swelling subsides on her left ankle and foot performing measured  7   X-rays from today were reviewed, radiologist interpretation is pending, hallux limitus, first met elevatus is noted, second and third metatarsals are within normal limits, no acute osseous abnormality is noted  8  Patient understands and agrees with the plan and will follow-up in 6 weeks              Subjective:   Patient presents today for evaluation care of left foot pain, she states she cannot put any weight or pressure on her left foot  She states this started when she bought some new slides to accommodate discomfort in the forefoot of her left foot she states she is feels like she is walking on a ball, she states within 2 days of wearing the shoes she started with heel pain, and then after that a few days later pain on the outside of her heel  She has stopped wearing those shoes, but her pain has not subsided  The following portions of the patient's history were reviewed and updated as appropriate:     Past Medical History:   Diagnosis Date   • Allergic 1987    Pneumovax and Roses   • Anxiety    • Asthma    • Atypical chest pain 06/11/2020   • Cat bite of hand 06/29/2021   • Disease of thyroid gland    • GERD (gastroesophageal reflux disease)    • Hearing loss    • Heart murmur    • Medicare annual wellness visit, subsequent 01/05/2021   • Migraine    • Peripheral vertigo    • Plantar fasciitis 2018 [facility]    Had Cortesone injection and wore a walking boot  for 6ish weeks  Right foot  • Raynaud disease    • S/P trigger finger release    • Skin tag 2017 [facility]    PCP removed it  • Sleep apnea    • Strain of left trapezius muscle 07/19/2019   • Tinea corporis 1984    Was treated and had never returned  • Varicella 1955    Mild case as a young child   • Visual disturbance        Past Surgical History:   Procedure Laterality Date   • CATARACT EXTRACTION     • COLON SURGERY     • EYE SURGERY  2019   • GLAUCOMA SURGERY     • HALLUX VALGUS CORRECTION Right 06/19/2007   • INCISION TENDON SHEATH HAND  05/23/2011    Release of A1 pulley   • KNEE ARTHROSCOPY W/ MENISCAL REPAIR Left 2003   • KNEE SURGERY  2005   • NV ESOPHAGOGASTRODUODENOSCOPY TRANSORAL DIAGNOSTIC N/A 08/22/2018    Procedure: ESOPHAGOGASTRODUODENOSCOPY (EGD);   Surgeon: Davis Starks MD;  Location:  MAIN OR;  Service: Gastroenterology   • NV TENDON SHEATH INCISION Left 07/11/2019    Procedure: RELEASE TRIGGER FINGER left ring;  Surgeon: Madonna Stokes MD;  Location: [facility];  Service: Orthopedics   • TOENAIL EXCISION  2005 [facility]    Left  great and left pinky toes  • TONSILLECTOMY  1953   • TRIGGER FINGER RELEASE     • TUBAL LIGATION  1977   • UPPER GASTROINTESTINAL ENDOSCOPY         Social History     Socioeconomic History   • Marital status: /Civil Union     Spouse name: None   • Number of children: None   • Years of education: None   • Highest education level: None   Occupational History   • 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)   Tobacco Use   • Smoking status: Never   • Smokeless tobacco: Never   Vaping Use   • Vaping Use: Never used   Substance and Sexual Activity   • Alcohol use:  Yes     Alcohol/week: 3 0 standard drinks of alcohol     Types: 3 Cans of beer per week     Comment: Per week   • Drug use: Never   • Sexual activity: None   Other Topics Concern   • None   Social History Narrative    Daily coffee consumption (1 cup/day)    Patient has living will    Uses safety equipment - seatbelts     Social Determinants of Health     Financial Resource Strain: Not on file   Food Insecurity: Not on file   Transportation Needs: Not on file   Physical Activity: Not on file   Stress: Not on file   Social Connections: Not on file   Intimate Partner Violence: Not on file   Housing Stability: Not on file          Current Outpatient Medications:   •  albuterol (2 5 mg/3 mL) 0 083 % nebulizer solution, Take 3 mL (2 5 mg total) by nebulization every 6 (six) hours as needed for wheezing or shortness of breath, Disp: 135 mL, Rfl: 0  •  albuterol (ProAir HFA) 90 mcg/act inhaler, Inhale 2 puffs every 6 (six) hours as needed for wheezing, Disp: 18 g, Rfl: 0  •  amLODIPine (NORVASC) 5 mg tablet, Take 1 tablet (5 mg total) by mouth daily, Disp: 90 tablet, Rfl: 3  •  aspirin 81 mg chewable tablet, Chew 1 tablet daily, Disp: , Rfl:   •  busPIRone (BUSPAR) 10 mg tablet, TAKE ONE TABLET BY MOUTH THREE TIMES A DAY, Disp: 90 tablet, Rfl: 0  •  cholecalciferol (VITAMIN D3) 1,000 units tablet, Take 1,000 Units by mouth daily, Disp: , Rfl:   •  citalopram (CeleXA) 40 mg tablet, Take 1 tablet (40 mg total) by mouth daily, Disp: 90 tablet, Rfl: 3  •  diphenoxylate-atropine (LOMOTIL) 2 5-0 025 mg per tablet, Take 1 tablet by mouth daily as needed (as needed), Disp: 30 tablet, Rfl: 0  •  Fluticasone Propionate, Inhal, (Flovent Diskus) 100 MCG/BLIST AEPB, Inhale 1 puff 2 (two) times a day Rinse mouth after use , Disp: 180 blister, Rfl: 3  •  Lactobacillus (Acidophilus) 100 MG CAPS, Take by mouth, Disp: , Rfl:   •  levothyroxine 25 mcg tablet, Take 1 tablet (25 mcg total) by mouth daily, Disp: 90 tablet, Rfl: 3  •  meloxicam (MOBIC) 15 mg tablet, Take 1 tablet (15 mg total) by mouth daily, Disp: 90 tablet, Rfl: 0  •  methylPREDNISolone 4 MG tablet therapy pack, Use as directed on package, Disp: 1 each, Rfl: 0  •  Multiple Vitamin (MULTIVITAMIN) tablet, Take 1 tablet by mouth daily, Disp: , Rfl:   •  omeprazole (PriLOSEC) 40 MG capsule, TAKE ONE CAPSULE BY MOUTH EVERY DAY, Disp: 90 capsule, Rfl: 0  •  pantoprazole (PROTONIX) 40 mg tablet, TAKE ONE TABLET BY MOUTH EVERY DAY, Disp: 90 tablet, Rfl: 0  •  pravastatin (PRAVACHOL) 10 mg tablet, Take 1 tablet (10 mg total) by mouth daily at bedtime, Disp: 90 tablet, Rfl: 3  •  silver sulfadiazine (SILVADENE,SSD) 1 % cream, Apply topically daily, Disp: 50 g, Rfl: 0  •  bisacodyl (DULCOLAX) 5 mg EC tablet, Take 1 tablet (5 mg total) by mouth daily as needed for constipation for up to 4 doses, Disp: 4 tablet, Rfl: 0    Allergies  Pneumococcal polysaccharide vaccine and Corrie    Family History   Problem Relation Age of Onset   • Lung cancer Mother    • Cancer Mother         Lung   • Stomach cancer Father    • Cancer Father         Stomach   • No Known Problems Sister    • No Known Problems Daughter    • Other Maternal Grandmother "Stroke syndrome   • No Known Problems Maternal Grandfather    • No Known Problems Paternal Grandmother    • No Known Problems Paternal Grandfather    • Other Maternal Aunt         Cardiomegaly   • Diabetes Maternal Aunt         Severe diabetic   • Cancer Maternal Aunt         Severe Diabetic   • Heart disease Maternal Aunt         Enlarged Heart   • No Known Problems Paternal Aunt    • No Known Problems Paternal Aunt    • No Known Problems Paternal Aunt    • Cancer Brother         Respiratory Disease           Objective:  /73   Pulse 76   Ht 5' 1\" (1 549 m) Comment: verbal  Wt 79 4 kg (175 lb)   BMI 33 07 kg/m²     Review of Systems   Constitutional: Negative for chills and fever  HENT: Negative for ear pain and sore throat  Eyes: Negative for pain and visual disturbance  Respiratory: Negative for cough and shortness of breath  Cardiovascular: Negative for chest pain and palpitations  Gastrointestinal: Negative for abdominal pain and vomiting  Genitourinary: Negative for dysuria and hematuria  Musculoskeletal: Negative for arthralgias and back pain  Left foot pain   Skin: Negative for color change and rash  Neurological: Negative for seizures and syncope  All other systems reviewed and are negative  Physical Exam  Constitutional:       Appearance: Normal appearance  She is well-developed and normal weight  HENT:      Head: Normocephalic and atraumatic  Nose: Nose normal       Mouth/Throat:      Mouth: Mucous membranes are moist       Pharynx: Oropharynx is clear  Eyes:      Conjunctiva/sclera: Conjunctivae normal    Cardiovascular:      Pulses:           Dorsalis pedis pulses are 2+ on the right side and 2+ on the left side  Posterior tibial pulses are 2+ on the right side and 2+ on the left side  Pulmonary:      Effort: Pulmonary effort is normal    Musculoskeletal:      Cervical back: Normal range of motion  Right lower leg: No edema        Left lower " "leg: No edema  Right foot: Decreased range of motion  Deformity present  Left foot: Decreased range of motion  Deformity present  Feet:      Right foot:      Protective Sensation: 10 sites tested  10 sites sensed  Skin integrity: Skin integrity normal       Toenail Condition: Right toenails are normal       Left foot:      Protective Sensation: 10 sites tested  10 sites sensed  Skin integrity: Skin integrity normal       Toenail Condition: Left toenails are normal       Comments: 1  Hallux limitus bilateral   2   Fat pad atrophy submetatarsal heads 1 through 5 bilateral   3   Pain on palpation plantar third metatarsal head left foot  4   Pain on palpation medial and central plantar calcaneal tubercle left heel  5   Pain on palpation insertion of peroneal tendon lateral left foot  6   Edema left foot  No ecchymosis, erythema, crepitus noted  7  MMT is 5 out of 5 bilateral  8  Active and passive range of motion is within normal limits and pain-free to the right, some pain on range of motion left forefoot  Skin:     General: Skin is warm and dry  Neurological:      Mental Status: She is alert and oriented to person, place, and time  Psychiatric:         Behavior: Behavior normal          Thought Content: Thought content normal          Judgment: Judgment normal               No pertinent results found  Suellen Spaulding DPM, DPM, FACFAS    Portions of the record may have been created with voice recognition software  Occasional wrong word or \"sound a like\" substitutions may have occurred due to the inherent limitations of voice recognition software  Read the chart carefully and recognize, using context, where substitutions have occurred    "

## 2023-06-02 NOTE — PATIENT INSTRUCTIONS
Start Medrol Dosepak immediately after picking it up  Wear brace on left foot/ankle during all waking hours, you may remove it to sleep, shower and when you are resting for 2 weeks consistently  Start physical therapy soon as possible  You may taper use of brace after 2 weeks based on your activity and pain, if you are having discomfort without brace please continue to use it  Follow-up in the office in 6 weeks, please call if you have any questions

## 2023-06-07 DIAGNOSIS — E03.9 ACQUIRED HYPOTHYROIDISM: ICD-10-CM

## 2023-06-07 DIAGNOSIS — E78.5 HYPERLIPIDEMIA, UNSPECIFIED HYPERLIPIDEMIA TYPE: ICD-10-CM

## 2023-06-07 DIAGNOSIS — I10 ESSENTIAL HYPERTENSION: ICD-10-CM

## 2023-06-07 DIAGNOSIS — F41.9 ANXIETY: ICD-10-CM

## 2023-06-08 RX ORDER — CITALOPRAM 40 MG/1
TABLET ORAL
Qty: 90 TABLET | Refills: 3 | Status: SHIPPED | OUTPATIENT
Start: 2023-06-08

## 2023-06-08 RX ORDER — AMLODIPINE BESYLATE 5 MG/1
TABLET ORAL
Qty: 90 TABLET | Refills: 3 | Status: SHIPPED | OUTPATIENT
Start: 2023-06-08

## 2023-06-08 RX ORDER — PRAVASTATIN SODIUM 10 MG
TABLET ORAL
Qty: 90 TABLET | Refills: 3 | Status: SHIPPED | OUTPATIENT
Start: 2023-06-08

## 2023-06-08 RX ORDER — LEVOTHYROXINE SODIUM 0.03 MG/1
TABLET ORAL
Qty: 90 TABLET | Refills: 3 | Status: SHIPPED | OUTPATIENT
Start: 2023-06-08

## 2023-06-09 ENCOUNTER — TELEPHONE (OUTPATIENT)
Dept: PODIATRY | Facility: CLINIC | Age: 73
End: 2023-06-09

## 2023-06-09 NOTE — TELEPHONE ENCOUNTER
S/w Mena Santana about her request for a cam boot  We discus Dr Daisy Rene recommendation about avoiding the cam boot, wear the ankle support and start physical therapy  Pt stated she is not going to physical therapy and she is going to do the exercises at her home  She verbalized understanding and appreciation

## 2023-06-14 ENCOUNTER — HOSPITAL ENCOUNTER (OUTPATIENT)
Dept: MAMMOGRAPHY | Facility: CLINIC | Age: 73
Discharge: HOME | End: 2023-06-14
Payer: COMMERCIAL

## 2023-06-14 VITALS — HEIGHT: 61 IN | WEIGHT: 175 LBS | BODY MASS INDEX: 33.04 KG/M2

## 2023-06-14 DIAGNOSIS — Z12.31 ENCOUNTER FOR SCREENING MAMMOGRAM FOR MALIGNANT NEOPLASM OF BREAST: ICD-10-CM

## 2023-06-14 PROCEDURE — 77067 SCR MAMMO BI INCL CAD: CPT

## 2023-06-14 PROCEDURE — 77063 BREAST TOMOSYNTHESIS BI: CPT

## 2023-06-26 ENCOUNTER — OFFICE VISIT (OUTPATIENT)
Dept: OBGYN CLINIC | Facility: CLINIC | Age: 73
End: 2023-06-26
Payer: COMMERCIAL

## 2023-06-26 VITALS
HEIGHT: 61 IN | SYSTOLIC BLOOD PRESSURE: 140 MMHG | BODY MASS INDEX: 32.85 KG/M2 | DIASTOLIC BLOOD PRESSURE: 60 MMHG | WEIGHT: 174 LBS

## 2023-06-26 DIAGNOSIS — M65.321 TRIGGER INDEX FINGER OF RIGHT HAND: Primary | ICD-10-CM

## 2023-06-26 DIAGNOSIS — M67.431 GANGLION CYST OF VOLAR ASPECT OF RIGHT WRIST: ICD-10-CM

## 2023-06-26 PROCEDURE — 99215 OFFICE O/P EST HI 40 MIN: CPT | Performed by: ORTHOPAEDIC SURGERY

## 2023-06-26 NOTE — PROGRESS NOTES
ASSESSMENT/PLAN:    Assessment:   Trigger Finger  right  index finger, Volar ganglion cyst  right and Thumb CMC Arthritis  right      Plan:   Right wrist volar ganglion cyst excision and right index trigger finger release was discussed at length including risks and benefits   [place] elected to proceed with right wrist volar ganglion cyst excision and right index trigger finger release, informed surgical consent was signed  She understands CMC pain will not improve after surgery as this is due to underlying arthritis  Follow up in the office 10-14 days post op for suture removal      Follow Up: After Surgery    To Do Next Visit:  Sutures out    General Discussions:  [facility] Arthritis: The anatomy and physiology of carpometacarpal joint arthritis was discussed with the patient today in the office  Deterioration of the articular cartilage eventually leads to hypermobility at the thumb [facility] joint, resulting in joint subluxation, osteophyte formation, cystic changes within the trapezium and base of the first metacarpal, as well as subchondral sclerosis  Eventually, pain, limited mobility, and compensatory hyperextension at the metacarpophalangeal joint may develop  While normal activity and usage of the thumb joint may provide a painful experience to the patient, this typically does not result in damage to the thumb or hand  Treatment options include resting thumb spica splints to decreased joint edema, pain, and inflammation  Therapy exercises to strengthen the thenar musculature may relieve pain, but do not alter the overall continued development of osteoarthritis  Oral medications, topical medications, corticosteroid injections may decrease pain and increase overall function  Eventually, approximately 5% of patients may require surgical intervention  Ganglion Cysts: The anatomy and physiology of the ganglion was discussed with the patient today in the office    Fluid leaking out of the joint surface typically creates a small sac, which can enlarge and cause pain or limitation of motion  Treatment options include observation, aspiration, or surgical incision were discussed with the patient today  Observation typically lead to resolution and approximately 10% of patients, aspiration least resolution approximately 50% of patients, and surgical excision lead to resolution in approximately 97% of patients  After discussion with the patient today, the patient voiced understanding of all treatment options  Operative Discussions:     Ganglion Cyst Excision: The anatomy and physiology of the ganglion was discussed with the patient today in the office  Fluid leaking out of the joint surface typically creates a small sac, which can enlarge and cause pain or limitation of motion  Treatment options include observation, aspiration, or surgical incision were discussed with the patient today  Observation typically lead to resolution and approximately 10% of patients, aspiration least resolution approximately 50% of patients, and surgical excision lead to resolution in approximately 97% of patients  After discussion with the patient today, the patient voiced understanding of all treatment options  The patient has elected excision of the ganglion  The risks and benefits of the procedure were explained to the patient, which include, but are not limited to: Bleeding, infection, recurrence, pain, scar, damage to tendons, damage to nerves, and damage to blood vessels, failure to give desired results and complications related to anesthesia   These risks, along with alternative conservative treatment options, and postoperative protocols were voiced back and understood by the patient   All questions were answered to the patient's satisfaction   The patient agrees to comply with a standard postoperative protocol, and is willing to proceed   Education was provided via written and auditory forms   There were no barriers to learning   Written handouts regarding wound care, incision and scar care, and general preoperative information was provided to the patient   Prior to surgery, the patient may be requested to stop all anti-inflammatory medications   Prophylactic aspirin, Plavix, and Coumadin may be allowed to be continued   Medications including vitamin E , ginkgo, and fish oil are requested to be stopped approximately one week prior to surgery   Hypertensive medications and beta blockers, if taken, should be continued  Trigger Finger Release: The anatomy and physiology of trigger finger was discussed with the patient today in the office  Edema and increased contact pressure within the flexor tendons at the A1 pulley can cause pain, crepitation, and limitation of function  Treatment options include resting MP blocking splints to decrease edema, oral anti-inflammatory medications, home or formal therapy exercises, up to 2 steroid injections or surgical release  While majority of patients do respond to conservative treatment, up to 20% may require surgical release  The patient has elected release of the trigger finger  The patient has elected to undergo a release of the A1 pulley (trigger finger)  A small incision will be made over the palmar aspect of the hand, the tendon sheath holding the flexor tendons will be released  In the postoperative period, light activities are allowed immediately, driving is allowed when narcotic medication has stopped, and the incision may get wet after 2 days  Heavy activities (lifting more than approximately 10 pounds) will be allowed after the follow up appointment in 1-2 weeks  While the pain and discomfort within the wrist typically improves rapidly, some residual discomfort may be present for up to 6 weeks    The nodule that is typically palpable in the palmar aspect of the hand will not be removed, as this would necessitate removal of a portion of the flexor tendon, however the catching, clicking, and locking should resolve  Approximate success rate is 98%  The risks and benefits of the procedure were explained to the patient, which include, but are not limited to: Bleeding, infection, recurrence, pain, scar, damage to tendons, damage to nerves, and damage to blood vessels, need for future surgery and complications related to anesthesia  If bony work is done, risks also include malunion and nonunion  These risks, along with alternative conservative treatment options, and postoperative protocols were voiced back and understood by the patient  All questions were answered to the patient's satisfaction  The patient agrees to comply with a standard postoperative protocol, and is willing to proceed  Education was provided via written and auditory forms  There were no barriers to learning  Written handouts regarding wound care, incision and scar care, and general preoperative information, as well as risks and benefits were provided to the patient     _____________________________________________________  CHIEF COMPLAINT:  Chief Complaint   Patient presents with   • Right Index Finger - Follow-up, Triggering     #2 injection 8/29/22 Discuss surgery    • Right Wrist - Mass, Pain         SUBJECTIVE:  Artem Hernandez is a 74 y o  female who presents for follow up regarding Trigger Finger  right  index finger  Since last visit, Artem Hernandez has tried steroid injections with only partial relief  She notes her right index finger continues to click, catch and lock  Today there is pain and a mass to the volar radial aspect of her right thumb/wrist  She notes pain to the volar aspect of her right wrist/base of her right thumb, which can radiate up the volar forearm at times      Radiation: yes, up the forearm   Associated symptoms: No Complaints  Handedness: right      PAST MEDICAL HISTORY:  Past Medical History:   Diagnosis Date   • Allergic 1987    Pneumovax and Roses   • Anxiety    • Asthma    • Atypical chest pain 06/11/2020   • Cat bite of hand 06/29/2021   • Disease of thyroid gland    • GERD (gastroesophageal reflux disease)    • Hearing loss    • Heart murmur    • Medicare annual wellness visit, subsequent 01/05/2021   • Migraine    • Peripheral vertigo    • Plantar fasciitis 2018 [facility]    Had Cortesone injection and wore a walking boot  for 6ish weeks  Right foot  • Raynaud disease    • S/P trigger finger release    • Skin tag 2017 [facility]    PCP removed it  • Sleep apnea    • Strain of left trapezius muscle 07/19/2019   • Tinea corporis 1984    Was treated and had never returned  • Varicella 1955    Mild case as a young child   • Visual disturbance        PAST SURGICAL HISTORY:  Past Surgical History:   Procedure Laterality Date   • CATARACT EXTRACTION     • COLON SURGERY     • EYE SURGERY  2019   • GLAUCOMA SURGERY     • HALLUX VALGUS CORRECTION Right 06/19/2007   • INCISION TENDON SHEATH HAND  05/23/2011    Release of A1 pulley   • KNEE ARTHROSCOPY W/ MENISCAL REPAIR Left 2003   • KNEE SURGERY  2005   • VA ESOPHAGOGASTRODUODENOSCOPY TRANSORAL DIAGNOSTIC N/A 08/22/2018    Procedure: ESOPHAGOGASTRODUODENOSCOPY (EGD); Surgeon: Barbie Calvo MD;  Location:  MAIN OR;  Service: Gastroenterology   • VA TENDON SHEATH INCISION Left 07/11/2019    Procedure: RELEASE TRIGGER FINGER left ring;  Surgeon: Aakash Hui MD;  Location:  MAIN OR;  Service: Orthopedics   • TOENAIL EXCISION  2005 [facility]    Left  great and left pinky toes     • TONSILLECTOMY  1953   • TRIGGER FINGER RELEASE     • TUBAL LIGATION  1977   • UPPER GASTROINTESTINAL ENDOSCOPY         FAMILY HISTORY:  Family History   Problem Relation Age of Onset   • Lung cancer Mother    • Cancer Mother         Lung   • Stomach cancer Father    • Cancer Father         Stomach   • No Known Problems Sister    • No Known Problems Daughter    • Other Maternal Grandmother         Stroke syndrome   • No Known Problems Maternal Grandfather    • No Known Problems Paternal Grandmother    • No Known Problems Paternal Grandfather    • Cancer Brother         Respiratory Disease   • Other Maternal Aunt         Cardiomegaly   • Diabetes Maternal Aunt         Severe diabetic   • Cancer Maternal Aunt         Severe Diabetic   • Heart disease Maternal Aunt         Enlarged Heart   • No Known Problems Paternal Aunt    • No Known Problems Paternal Aunt    • No Known Problems Paternal Aunt        SOCIAL HISTORY:  Social History     Tobacco Use   • Smoking status: Never   • Smokeless tobacco: Never   Vaping Use   • Vaping Use: Never used   Substance Use Topics   • Alcohol use:  Yes     Alcohol/week: 3 0 standard drinks of alcohol     Types: 3 Cans of beer per week     Comment: Per week   • Drug use: Never       MEDICATIONS:    Current Outpatient Medications:   •  albuterol (2 5 mg/3 mL) 0 083 % nebulizer solution, Take 3 mL (2 5 mg total) by nebulization every 6 (six) hours as needed for wheezing or shortness of breath, Disp: 135 mL, Rfl: 0  •  albuterol (ProAir HFA) 90 mcg/act inhaler, Inhale 2 puffs every 6 (six) hours as needed for wheezing, Disp: 18 g, Rfl: 0  •  amLODIPine (NORVASC) 5 mg tablet, TAKE 1 TABLET BY MOUTH  DAILY, Disp: 90 tablet, Rfl: 3  •  aspirin 81 mg chewable tablet, Chew 1 tablet daily, Disp: , Rfl:   •  busPIRone (BUSPAR) 10 mg tablet, TAKE ONE TABLET BY MOUTH THREE TIMES A DAY, Disp: 90 tablet, Rfl: 0  •  cholecalciferol (VITAMIN D3) 1,000 units tablet, Take 1,000 Units by mouth daily, Disp: , Rfl:   •  citalopram (CeleXA) 40 mg tablet, TAKE 1 TABLET BY MOUTH  DAILY, Disp: 90 tablet, Rfl: 3  •  diphenoxylate-atropine (LOMOTIL) 2 5-0 025 mg per tablet, Take 1 tablet by mouth daily as needed (as needed), Disp: 30 tablet, Rfl: 0  •  Fluticasone Propionate, Inhal, (Flovent Diskus) 100 MCG/BLIST AEPB, Inhale 1 puff 2 (two) times a day Rinse mouth after use , Disp: 180 blister, Rfl: 3  •  Lactobacillus (Acidophilus) 100 MG CAPS, Take by mouth, Disp: , Rfl:   •  levothyroxine 25 "mcg tablet, TAKE 1 TABLET BY MOUTH  DAILY, Disp: 90 tablet, Rfl: 3  •  meloxicam (MOBIC) 15 mg tablet, Take 1 tablet (15 mg total) by mouth daily, Disp: 90 tablet, Rfl: 0  •  Multiple Vitamin (MULTIVITAMIN) tablet, Take 1 tablet by mouth daily, Disp: , Rfl:   •  omeprazole (PriLOSEC) 40 MG capsule, TAKE ONE CAPSULE BY MOUTH EVERY DAY, Disp: 90 capsule, Rfl: 0  •  pantoprazole (PROTONIX) 40 mg tablet, TAKE ONE TABLET BY MOUTH EVERY DAY, Disp: 90 tablet, Rfl: 0  •  pravastatin (PRAVACHOL) 10 mg tablet, TAKE 1 TABLET BY MOUTH  DAILY AT BEDTIME, Disp: 90 tablet, Rfl: 3  •  silver sulfadiazine (SILVADENE,SSD) 1 % cream, Apply topically daily, Disp: 50 g, Rfl: 0    ALLERGIES:  Allergies   Allergen Reactions   • Pneumococcal Polysaccharide Vaccine Fever     Other reaction(s): Chills, fever and mottling  Category: Allergy;    • [place] Allergic Rhinitis, Headache, Nasal Congestion and Visual Disturbance       REVIEW OF SYSTEMS:  Pertinent items are noted in HPI  A comprehensive review of systems was negative      LABS:  HgA1c:   Lab Results   Component Value Date    HGBA1C 6 0 (H) 09/07/2018     BMP:   Lab Results   Component Value Date    GLUCOSE 107 (H) 04/04/2017    CALCIUM 9 8 04/04/2017     04/04/2017    K 4 6 01/12/2023    CO2 24 01/12/2023     01/12/2023    BUN 16 01/12/2023    CREATININE 0 76 01/12/2023           _____________________________________________________  PHYSICAL EXAMINATION:  Vital signs: /60   Ht 5' 1\" (1 549 m)   Wt 78 9 kg (174 lb)   BMI 32 88 kg/m²   General: well developed and well nourished, alert, oriented times 3 and appears comfortable  Psychiatric: Normal  HEENT: Trachea Midline, No torticollis  Cardiovascular: No discernable arrhythmia  Pulmonary: No wheezing or stridor  Abdomen: No rebound or guarding  Extremities: No peripheral edema  Skin: No erythema, lacerations, fluctation, ulcerations  Neurovascular: Sensation Intact to the Median, Ulnar, Radial Nerve, Motor Intact " to the Median, Ulnar, Radial Nerve and Pulses Intact    MUSCULOSKELETAL EXAMINATION:    RIGHT SIDE:  Hand/wrist: No erythema, ecchymosis or edema, 8x8 mm volar radial ganglion cyst, + triggering of the index finger, A1 pulley of the index finger is tender to palpation + shoulder sign, CMC joint is tender to palpation, + crepitus, + circumduction, full composite fist      _____________________________________________________  STUDIES REVIEWED:  No Studies to review      PROCEDURES PERFORMED:  Procedures  No Procedures performed today    Scribe Attestation    I,:  Raisa Yuan am acting as a scribe while in the presence of the attending physician :       I,:  Krystal Mckeon MD personally performed the services described in this documentation    as scribed in my presence :

## 2023-07-13 NOTE — PROGRESS NOTES
Patient ID: She Noguera is a 74 y.o. female Date of Birth 1950       Chief Complaint   Patient presents with   • Foot Pain     Left foot 6 weeks f/u               Diagnosis:  1. Foot sprain, left, initial encounter    2. Plantar fasciitis of left foot    3. Peroneal tendonitis, left    4. Metatarsalgia, left foot      1. Pedal examination with socks and shoes removed. 2.  Patient has significant improvement with wearing of ASO and shoe gear modification, at this time she is to discontinue use of the ASO, she is to wear good supportive shoes, I explained to her if the shoes hurt her or she is having any pain or discomfort in her foot or ankle she is to not wear them but current sneakers are very good. 3.  Patient continues with metatarsalgia submetatarsal heads 2 3 and 4 of left foot, I added a metatarsal pad to plantar surface of current sneakers to remove pressure from the area. She states this feels good. 4.  I recommended over-the-counter inserts or arch support sleeves that she can wear in her winter shoes and boots. 5.  Patient has not gone to physical therapy but complains of weakness in her ankles for when she was quite young, I have explained to her the importance of going to physical therapy and building her strength and not using strapping or braces as this will create weakness in other parts of her foot necklace. 6.  Patient understands and agrees with plan and will follow-up as needed. Subjective:   Patient presents today for follow up of left foot pain/sprain, plantar fasciitis, peroneal tendonitis, metatarsalgia, she states she completed her medrol dose pack as prescribed, wore her ASO, did not go to PT as prescribed but did home exercises and overall she is doing much better with just a little bit of discomfort on the bottom of her left foot on her metatarsal heads.         The following portions of the patient's history were reviewed and updated as appropriate: allergies, current medications, past family history, past medical history, past social history, past surgical history and problem list.        Objective:  /72 (BP Location: Right arm, Patient Position: Sitting, Cuff Size: Adult)   Pulse 71   Ht 5' 1" (1.549 m) Comment: verbal  Wt 78.9 kg (174 lb)   BMI 32.88 kg/m²     Review of Systems   Constitutional: Negative for chills and fever. HENT: Negative for ear pain and sore throat. Eyes: Negative for pain and visual disturbance. Respiratory: Negative for cough and shortness of breath. Cardiovascular: Negative for chest pain and palpitations. Gastrointestinal: Negative for abdominal pain and vomiting. Genitourinary: Negative for dysuria and hematuria. Musculoskeletal: Negative for arthralgias and back pain. Left foot pain    Skin: Negative for color change and rash. Neurological: Negative for seizures and syncope. All other systems reviewed and are negative. Physical Exam  Constitutional:       Appearance: Normal appearance. She is well-developed and normal weight. HENT:      Head: Normocephalic and atraumatic. Nose: Nose normal.      Mouth/Throat:      Mouth: Mucous membranes are moist.      Pharynx: Oropharynx is clear. Eyes:      Conjunctiva/sclera: Conjunctivae normal.   Cardiovascular:      Pulses:           Dorsalis pedis pulses are 2+ on the right side and 2+ on the left side. Posterior tibial pulses are 2+ on the right side and 2+ on the left side. Pulmonary:      Effort: Pulmonary effort is normal.   Musculoskeletal:      Cervical back: Normal range of motion. Right lower leg: No edema. Left lower leg: No edema. Right foot: Decreased range of motion. Left foot: Decreased range of motion. Feet:      Right foot:      Protective Sensation: 10 sites tested. 10 sites sensed.       Skin integrity: Skin integrity normal.      Toenail Condition: Right toenails are normal.      Left foot: Protective Sensation: 10 sites tested. 10 sites sensed. Skin integrity: Skin integrity normal.      Toenail Condition: Left toenails are normal.      Comments: 1. Hallux limitus bilateral.  2.  Fat pad atrophy submetatarsal heads 1 through 5 bilateral.  3.  No Pain on palpation plantar third metatarsal head left foot. 4.  No Pain on palpation medial and central plantar calcaneal tubercle left heel. 5. No  Pain on palpation insertion of peroneal tendon lateral left foot. 6.  No Edema left foot. No ecchymosis, erythema, crepitus noted  7. MMT is 5 out of 5 bilateral  8. Active and passive range of motion is within normal limits and pain-free to BL  Skin:     General: Skin is warm and dry. Neurological:      Mental Status: She is alert and oriented to person, place, and time. Psychiatric:         Behavior: Behavior normal.         Thought Content: Thought content normal.         Judgment: Judgment normal.                No pertinent results found. Rozann Heading, DPM, DPM, FACFAS    Portions of the record may have been created with voice recognition software. Occasional wrong word or "sound a like" substitutions may have occurred due to the inherent limitations of voice recognition software. Read the chart carefully and recognize, using context, where substitutions have occurred.

## 2023-07-14 ENCOUNTER — OFFICE VISIT (OUTPATIENT)
Dept: PODIATRY | Facility: CLINIC | Age: 73
End: 2023-07-14
Payer: COMMERCIAL

## 2023-07-14 VITALS
BODY MASS INDEX: 32.85 KG/M2 | DIASTOLIC BLOOD PRESSURE: 72 MMHG | HEIGHT: 61 IN | WEIGHT: 174 LBS | SYSTOLIC BLOOD PRESSURE: 149 MMHG | HEART RATE: 71 BPM

## 2023-07-14 DIAGNOSIS — M76.72 PERONEAL TENDONITIS, LEFT: ICD-10-CM

## 2023-07-14 DIAGNOSIS — S93.602A FOOT SPRAIN, LEFT, INITIAL ENCOUNTER: Primary | ICD-10-CM

## 2023-07-14 DIAGNOSIS — M72.2 PLANTAR FASCIITIS OF LEFT FOOT: ICD-10-CM

## 2023-07-14 DIAGNOSIS — M77.42 METATARSALGIA, LEFT FOOT: ICD-10-CM

## 2023-07-14 PROCEDURE — 99213 OFFICE O/P EST LOW 20 MIN: CPT | Performed by: PODIATRIST

## 2023-07-18 ENCOUNTER — TELEPHONE (OUTPATIENT)
Dept: OBGYN CLINIC | Facility: HOSPITAL | Age: 73
End: 2023-07-18

## 2023-07-18 NOTE — TELEPHONE ENCOUNTER
Please advise the patient that she is to be n.p.o. at midnight. She cannot be drinking water throughout the nighttime. Her surgery is going to be under general anesthesia. Thank you.

## 2023-07-18 NOTE — TELEPHONE ENCOUNTER
Caller: Self    Doctor: Shahana Huitron    Reason for call: Patient calling to schedule 1st postop, She states she was scheduled by Lisa Patel for 10/2/23 at 545 but there is nothing in her appts. Can she be scheduled for that day, earlier in the day if possible    Also, is patient to be NPO after midnight prior to surgery, does this include water?     Call back#: [phone]

## 2023-07-27 DIAGNOSIS — M67.431 GANGLION CYST OF VOLAR ASPECT OF RIGHT WRIST: Primary | ICD-10-CM

## 2023-07-27 LAB
ALBUMIN SERPL-MCNC: 4.7 G/DL (ref 3.8–4.8)
ALBUMIN/GLOB SERPL: 2 {RATIO} (ref 1.2–2.2)
ALP SERPL-CCNC: 46 IU/L (ref 44–121)
ALT SERPL-CCNC: 16 IU/L (ref 0–32)
AST SERPL-CCNC: 22 IU/L (ref 0–40)
BILIRUB SERPL-MCNC: 0.3 MG/DL (ref 0–1.2)
BUN SERPL-MCNC: 11 MG/DL (ref 8–27)
BUN/CREAT SERPL: 14 (ref 12–28)
CALCIUM SERPL-MCNC: 10.3 MG/DL (ref 8.7–10.3)
CHLORIDE SERPL-SCNC: 102 MMOL/L (ref 96–106)
CHOLEST SERPL-MCNC: 175 MG/DL (ref 100–199)
CO2 SERPL-SCNC: 22 MMOL/L (ref 20–29)
CREAT SERPL-MCNC: 0.76 MG/DL (ref 0.57–1)
EGFR: 82 ML/MIN/1.73
ERYTHROCYTE [DISTWIDTH] IN BLOOD BY AUTOMATED COUNT: 12.2 % (ref 11.7–15.4)
EST. AVERAGE GLUCOSE BLD GHB EST-MCNC: 131 MG/DL
GLOBULIN SER-MCNC: 2.4 G/DL (ref 1.5–4.5)
GLUCOSE SERPL-MCNC: 112 MG/DL (ref 70–99)
HBA1C MFR BLD: 6.2 % (ref 4.8–5.6)
HCT VFR BLD AUTO: 40.6 % (ref 34–46.6)
HDLC SERPL-MCNC: 70 MG/DL
HGB BLD-MCNC: 13.4 G/DL (ref 11.1–15.9)
LDLC SERPL CALC-MCNC: 87 MG/DL (ref 0–99)
LDLC/HDLC SERPL: 1.2 RATIO (ref 0–3.2)
MCH RBC QN AUTO: 30.8 PG (ref 26.6–33)
MCHC RBC AUTO-ENTMCNC: 33 G/DL (ref 31.5–35.7)
MCV RBC AUTO: 93 FL (ref 79–97)
PLATELET # BLD AUTO: 256 X10E3/UL (ref 150–450)
POTASSIUM SERPL-SCNC: 5 MMOL/L (ref 3.5–5.2)
PROT SERPL-MCNC: 7.1 G/DL (ref 6–8.5)
RBC # BLD AUTO: 4.35 X10E6/UL (ref 3.77–5.28)
SL AMB VLDL CHOLESTEROL CALC: 18 MG/DL (ref 5–40)
SODIUM SERPL-SCNC: 142 MMOL/L (ref 134–144)
TRIGL SERPL-MCNC: 100 MG/DL (ref 0–149)
TSH SERPL DL<=0.005 MIU/L-ACNC: 3.55 UIU/ML (ref 0.45–4.5)
WBC # BLD AUTO: 7 X10E3/UL (ref 3.4–10.8)

## 2023-07-28 ENCOUNTER — TELEPHONE (OUTPATIENT)
Dept: OBGYN CLINIC | Facility: MEDICAL CENTER | Age: 73
End: 2023-07-28

## 2023-07-28 NOTE — TELEPHONE ENCOUNTER
Caller: Patient    Doctor: [facility]    Reason for call:     Patient is requesting a call back about her surgery on 9/21/23 for her right index trigger finger. Please give her a bret call back.     Call back#: [phone]

## 2023-08-02 ENCOUNTER — OFFICE VISIT (OUTPATIENT)
Dept: FAMILY MEDICINE CLINIC | Facility: HOSPITAL | Age: 73
End: 2023-08-02
Payer: COMMERCIAL

## 2023-08-02 VITALS
BODY MASS INDEX: 32.47 KG/M2 | SYSTOLIC BLOOD PRESSURE: 142 MMHG | TEMPERATURE: 98.3 F | HEART RATE: 63 BPM | WEIGHT: 172 LBS | DIASTOLIC BLOOD PRESSURE: 78 MMHG | HEIGHT: 61 IN

## 2023-08-02 DIAGNOSIS — J45.20 MILD INTERMITTENT ASTHMA WITHOUT COMPLICATION: ICD-10-CM

## 2023-08-02 DIAGNOSIS — E03.9 ACQUIRED HYPOTHYROIDISM: ICD-10-CM

## 2023-08-02 DIAGNOSIS — K21.9 GASTROESOPHAGEAL REFLUX DISEASE WITHOUT ESOPHAGITIS: ICD-10-CM

## 2023-08-02 DIAGNOSIS — Z12.11 SCREENING FOR COLON CANCER: ICD-10-CM

## 2023-08-02 DIAGNOSIS — I10 ESSENTIAL HYPERTENSION: ICD-10-CM

## 2023-08-02 DIAGNOSIS — F41.9 ANXIETY: ICD-10-CM

## 2023-08-02 DIAGNOSIS — M94.0 COSTOCHONDRITIS: ICD-10-CM

## 2023-08-02 DIAGNOSIS — Z00.00 MEDICARE ANNUAL WELLNESS VISIT, SUBSEQUENT: Primary | ICD-10-CM

## 2023-08-02 DIAGNOSIS — E78.5 HYPERLIPIDEMIA, UNSPECIFIED HYPERLIPIDEMIA TYPE: ICD-10-CM

## 2023-08-02 PROBLEM — R53.82 CHRONIC FATIGUE: Status: RESOLVED | Noted: 2018-09-05 | Resolved: 2023-08-02

## 2023-08-02 PROBLEM — R14.2 BURPING: Status: RESOLVED | Noted: 2018-07-17 | Resolved: 2023-08-02

## 2023-08-02 PROCEDURE — 99214 OFFICE O/P EST MOD 30 MIN: CPT | Performed by: FAMILY MEDICINE

## 2023-08-02 PROCEDURE — G0439 PPPS, SUBSEQ VISIT: HCPCS | Performed by: FAMILY MEDICINE

## 2023-08-02 RX ORDER — CITALOPRAM 20 MG/1
20 TABLET ORAL DAILY
Qty: 90 TABLET | Refills: 2 | Status: SHIPPED | OUTPATIENT
Start: 2023-08-02

## 2023-08-02 RX ORDER — AMLODIPINE BESYLATE 5 MG/1
5 TABLET ORAL DAILY
Qty: 90 TABLET | Refills: 3 | Status: SHIPPED | OUTPATIENT
Start: 2023-08-02

## 2023-08-02 RX ORDER — LEVOTHYROXINE SODIUM 0.03 MG/1
25 TABLET ORAL DAILY
Qty: 90 TABLET | Refills: 3 | Status: SHIPPED | OUTPATIENT
Start: 2023-08-02

## 2023-08-02 RX ORDER — PRAVASTATIN SODIUM 10 MG
10 TABLET ORAL
Qty: 90 TABLET | Refills: 3 | Status: SHIPPED | OUTPATIENT
Start: 2023-08-02

## 2023-08-02 RX ORDER — PANTOPRAZOLE SODIUM 40 MG/1
40 TABLET, DELAYED RELEASE ORAL DAILY
Qty: 90 TABLET | Refills: 3 | Status: SHIPPED | OUTPATIENT
Start: 2023-08-02

## 2023-08-02 NOTE — PROGRESS NOTES
Assessment and Plan:     Problem List Items Addressed This Visit        Digestive    Gastroesophageal reflux disease     Has been doing well. On omeprazole and protonix. Trial of dc omeprazole and continue with protonix. Relevant Medications    pantoprazole (PROTONIX) 40 mg tablet       Endocrine    Hypothyroidism    Relevant Medications    levothyroxine 25 mcg tablet       Respiratory    Mild intermittent asthma     Stable. No new concerns. Musculoskeletal and Integument    Costochondritis       Other    Anxiety     Patient has been doing well. Has been on celexa 40 mg for many years. Agrees to trial of celexa 20 mg daily. Monitor syttmpoms. Relevant Medications    citalopram (CeleXA) 20 mg tablet    Hyperlipidemia    Relevant Medications    pravastatin (PRAVACHOL) 10 mg tablet   Other Visit Diagnoses     Medicare annual wellness visit, subsequent    -  Primary    Screening for colon cancer        Relevant Orders    Cologuard    Essential hypertension        Relevant Medications    amLODIPine (NORVASC) 5 mg tablet           Preventive health issues were discussed with patient, and age appropriate screening tests were ordered as noted in patient's After Visit Summary. Personalized health advice and appropriate referrals for health education or preventive services given if needed, as noted in patient's After Visit Summary. History of Present Illness:     Patient presents for a Medicare Wellness Visit    Pt here for fu of chronic conditions and awv. Has been doing well. Needing refills. No new concerns. Patient Care Team:  Serina Walker MD as PCP - General (Family Medicine)  DO Brooke Lopez DO Louellen Andrea, MD (Inactive) (Family Medicine)  Serina Walker MD (Family Medicine)     Review of Systems:     Review of Systems   Constitutional: Negative.   Negative for activity change, appetite change, chills, diaphoresis, fatigue and fever. HENT: Negative for congestion, facial swelling and sore throat. Respiratory: Negative. Negative for apnea, cough, chest tightness and shortness of breath. Cardiovascular: Negative. Negative for chest pain and palpitations. Gastrointestinal: Negative. Negative for abdominal distention, abdominal pain, blood in stool, constipation, diarrhea and nausea. Genitourinary: Negative. Negative for difficulty urinating, dysuria, flank pain and frequency. Problem List:     Patient Active Problem List   Diagnosis   • Abnormal mammogram   • Anxiety   • Hiatal hernia   • Hyperlipidemia   • Hypothyroidism   • Impaired fasting glucose   • Obstructive sleep apnea   • Raynaud's syndrome without gangrene   • Mild intermittent asthma   • Rib sprain, initial encounter   • Hypertension   • Right wrist tendonitis   • BMI 31.0-31.9,adult   • Trigger finger, left ring finger   • Obesity (BMI 30.0-34. 9)   • Gastroesophageal reflux disease   • Oropharyngeal dysphagia   • Muscle spasm   • Seborrheic keratosis   • Nondisplaced fracture of right ulna   • Asymmetrical hearing loss of both ears   • Neck mass   • Costochondritis   • Foot sprain, left, initial encounter      Past Medical and Surgical History:     Past Medical History:   Diagnosis Date   • Allergic 1987    Pneumovax and Roses   • Anxiety    • Asthma    • Atypical chest pain 06/11/2020   • Burping 7/17/2018   • Cat bite of hand 06/29/2021   • Chronic fatigue 9/5/2018   • Disease of thyroid gland    • GERD (gastroesophageal reflux disease)    • Hearing loss    • Heart murmur    • Medicare annual wellness visit, subsequent 01/05/2021   • Migraine    • Peripheral vertigo    • Plantar fasciitis 2018 [facility]    Had Cortesone injection and wore a walking boot  for 6ish weeks. Right foot. • Raynaud disease    • S/P trigger finger release    • Skin tag 2017 [facility]    PCP removed it.    • Sleep apnea    • Strain of left trapezius muscle 07/19/2019   • Tinea corporis 1984    Was treated and had never returned. • Varicella 1955    Mild case as a young child   • Visual disturbance      Past Surgical History:   Procedure Laterality Date   • CATARACT EXTRACTION     • COLON SURGERY     • EYE SURGERY  2019   • GLAUCOMA SURGERY     • HALLUX VALGUS CORRECTION Right 06/19/2007   • INCISION TENDON SHEATH HAND  05/23/2011    Release of A1 pulley   • KNEE ARTHROSCOPY W/ MENISCAL REPAIR Left 2003   • KNEE SURGERY  2005   • UT ESOPHAGOGASTRODUODENOSCOPY TRANSORAL DIAGNOSTIC N/A 08/22/2018    Procedure: ESOPHAGOGASTRODUODENOSCOPY (EGD); Surgeon: Omar Marie MD;  Location: [facility];  Service: Gastroenterology   • UT TENDON SHEATH INCISION Left 07/11/2019    Procedure: RELEASE TRIGGER FINGER left ring;  Surgeon: Emelyn Cope MD;  Location: [facility];  Service: Orthopedics   • TOENAIL EXCISION  2005 [facility]    Left  great and left pinky toes.    • TONSILLECTOMY  1953   • TRIGGER FINGER RELEASE     • TUBAL LIGATION  1977   • UPPER GASTROINTESTINAL ENDOSCOPY        Family History:     Family History   Problem Relation Age of Onset   • Lung cancer Mother    • Cancer Mother         Lung   • Stomach cancer Father    • Cancer Father         Stomach   • No Known Problems Sister    • No Known Problems Daughter    • Other Maternal Grandmother         Stroke syndrome   • No Known Problems Maternal Grandfather    • No Known Problems Paternal Grandmother    • No Known Problems Paternal Grandfather    • Cancer Brother         Respiratory Disease   • Other Maternal Aunt         Cardiomegaly   • Diabetes Maternal Aunt         Severe diabetic   • Cancer Maternal Aunt         Severe Diabetic   • Heart disease Maternal Aunt         Enlarged Heart   • No Known Problems Paternal Aunt    • No Known Problems Paternal Aunt    • No Known Problems Paternal Aunt       Social History:     Social History     Socioeconomic History   • Marital status: /Civil Union     Spouse name: None   • Number of children: None   • Years of education: None   • Highest education level: None   Occupational History   • 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)   Tobacco Use   • Smoking status: Never   • Smokeless tobacco: Never   Vaping Use   • Vaping Use: Never used   Substance and Sexual Activity   • Alcohol use: Yes     Alcohol/week: 3.0 standard drinks of alcohol     Types: 3 Cans of beer per week     Comment: Per week   • Drug use: Never   • Sexual activity: None   Other Topics Concern   • None   Social History Narrative    Daily coffee consumption (1 cup/day)    Patient has living will    Uses safety equipment - seatbelts     Social Determinants of Health     Financial Resource Strain: Low Risk  (8/2/2023)    Overall Financial Resource Strain (CARDIA)    • Difficulty of Paying Living Expenses: Not hard at all   Food Insecurity: Not on file   Transportation Needs: No Transportation Needs (8/2/2023)    PRAPARE - Transportation    • Lack of Transportation (Medical): No    • Lack of Transportation (Non-Medical):  No   Physical Activity: Not on file   Stress: Not on file   Social Connections: Not on file   Intimate Partner Violence: Not on file   Housing Stability: Not on file      Medications and Allergies:     Current Outpatient Medications   Medication Sig Dispense Refill   • albuterol (2.5 mg/3 mL) 0.083 % nebulizer solution Take 3 mL (2.5 mg total) by nebulization every 6 (six) hours as needed for wheezing or shortness of breath 135 mL 0   • albuterol (ProAir HFA) 90 mcg/act inhaler Inhale 2 puffs every 6 (six) hours as needed for wheezing 18 g 0   • amLODIPine (NORVASC) 5 mg tablet Take 1 tablet (5 mg total) by mouth daily 90 tablet 3   • aspirin 81 mg chewable tablet Chew 1 tablet daily     • cholecalciferol (VITAMIN D3) 1,000 units tablet Take 1,000 Units by mouth daily     • citalopram (CeleXA) 20 mg tablet Take 1 tablet (20 mg total) by mouth daily 90 tablet 2   • diphenoxylate-atropine (LOMOTIL) 2.5-0.025 mg per tablet Take 1 tablet by mouth daily as needed (as needed) 30 tablet 0   • Fluticasone Propionate, Inhal, (Flovent Diskus) 100 MCG/BLIST AEPB Inhale 1 puff 2 (two) times a day Rinse mouth after use. 180 blister 3   • Lactobacillus (Acidophilus) 100 MG CAPS Take by mouth     • levothyroxine 25 mcg tablet Take 1 tablet (25 mcg total) by mouth daily 90 tablet 3   • meloxicam (MOBIC) 15 mg tablet Take 1 tablet (15 mg total) by mouth daily 90 tablet 0   • Multiple Vitamin (MULTIVITAMIN) tablet Take 1 tablet by mouth daily     • pantoprazole (PROTONIX) 40 mg tablet Take 1 tablet (40 mg total) by mouth daily 90 tablet 3   • pravastatin (PRAVACHOL) 10 mg tablet Take 1 tablet (10 mg total) by mouth daily at bedtime 90 tablet 3   • silver sulfadiazine (SILVADENE,SSD) 1 % cream Apply topically daily 50 g 0     No current facility-administered medications for this visit. Allergies   Allergen Reactions   • Pneumococcal Polysaccharide Vaccine Fever     Other reaction(s): Chills, fever and mottling  Category:  Allergy;    • Corrie Allergic Rhinitis, Headache, Nasal Congestion and Visual Disturbance      Immunizations:     Immunization History   Administered Date(s) Administered   • COVID-19 MODERNA VACC 0.5 ML IM 03/24/2021, 04/21/2021   • COVID-19 PFIZER VACCINE 0.3 ML IM 10/29/2021   • H1N1, All Formulations 11/03/2009   • Hep B, adult 01/07/1999, 02/09/1999, 08/10/1999   • INFLUENZA 10/17/2018, 10/24/2019, 10/04/2022   • Influenza Injectable, MDCK, Preservative Free, Quadrivalent, 0.5 mL 10/24/2019   • Influenza Split High Dose Preservative Free IM 10/27/2015, 09/14/2016   • Influenza, high dose seasonal 0.7 mL 09/02/2020   • Influenza, seasonal, injectable 1950, 10/15/2014   • Pneumococcal Polysaccharide PPV23 01/01/2000   • Tdap 01/01/2010, 10/12/2010   • Zoster 02/02/2015      Health Maintenance:         Topic Date Due   • Colorectal Cancer Screening  06/13/2022   • Breast Cancer Screening: Mammogram  06/14/2024   • Hepatitis C Screening  Completed         Topic Date Due   • Pneumococcal Vaccine: 65+ Years (2 - PCV) 01/01/2001   • COVID-19 Vaccine (4 - Moderna series) 12/24/2021   • Influenza Vaccine (1) 09/01/2023      Medicare Screening Tests and Risk Assessments:     Scooter Dennis is here for her Subsequent Wellness visit. Health Risk Assessment:   Patient rates overall health as very good. Patient feels that their physical health rating is same. Patient is very satisfied with their life. Eyesight was rated as slightly worse. Hearing was rated as same. Patient feels that their emotional and mental health rating is same. Patients states they are never, rarely angry. Patient states they are never, rarely unusually tired/fatigued. Pain experienced in the last 7 days has been none. Patient states that she has experienced no weight loss or gain in last 6 months. Depression Screening:   PHQ-2 Score: 0      Fall Risk Screening: In the past year, patient has experienced: no history of falling in past year      Urinary Incontinence Screening:   Patient has not leaked urine accidently in the last six months. Home Safety:  Patient does not have trouble with stairs inside or outside of their home. Patient has working smoke alarms and has working carbon monoxide detector. Home safety hazards include: none. Nutrition:   Current diet is Regular. Medications:   Patient is currently taking over-the-counter supplements. OTC medications include: see medication list. Patient is able to manage medications. Activities of Daily Living (ADLs)/Instrumental Activities of Daily Living (IADLs):   Walk and transfer into and out of bed and chair?: Yes  Dress and groom yourself?: Yes    Bathe or shower yourself?: Yes    Feed yourself?  Yes  Do your laundry/housekeeping?: Yes  Manage your money, pay your bills and track your expenses?: Yes  Make your own meals?: Yes    Do your own shopping?: Yes    Previous Hospitalizations:   Any hospitalizations or ED visits within the last 12 months?: No      Advance Care Planning:   Living will: Yes    Durable POA for healthcare: Yes    Advanced directive: Yes      Cognitive Screening:   Provider or family/friend/caregiver concerned regarding cognition?: No    PREVENTIVE SCREENINGS      Cardiovascular Screening:    General: Screening Not Indicated and History Lipid Disorder      Diabetes Screening:     General: Screening Current      Colorectal Cancer Screening:     General: Risks and Benefits Discussed    Due for: Cologuard      Breast Cancer Screening:     General: Screening Current      Cervical Cancer Screening:    General: Screening Not Indicated      Osteoporosis Screening:    General: Patient Declines      Abdominal Aortic Aneurysm (AAA) Screening:        General: Risks and Benefits Discussed      Lung Cancer Screening:     General: Screening Not Indicated      Hepatitis C Screening:    General: Screening Current    Screening, Brief Intervention, and Referral to Treatment (SBIRT)    Screening  Typical number of drinks in a day: 0  Typical number of drinks in a week: 3  Interpretation: Low risk drinking behavior. AUDIT-C Screenin) How often did you have a drink containing alcohol in the past year? 2 to 3 times a week  2) How many drinks did you have on a typical day when you were drinking in the past year? 1 to 2  3) How often did you have 6 or more drinks on one occasion in the past year? never    AUDIT-C Score: 3  Interpretation: Score 3-12 (female): POSITIVE screen for alcohol misuse    AUDIT Screenin) How often during the last year have you found that you were not able to stop drinking once you had started?  0 - never  5) How often during the last year have you failed to do what was normally expected from you because of drinking? 0 - never  6) How often during the last year have you needed a first drink in the morning to get yourself going after a heavy drinking session? 0 - never  7) How often during the last year have you had a feeling of guilt or remorse after drinking? 0 - never  8) How often during the last year have you been unable to remember what happened the night before because you had been drinking? 0 - never  9) Have you or someone else been injured as a result of your drinking? 0 - no  10) Has a relative or friend or a doctor or another health worker been concerned about your drinking or suggested you cut down? 0 - no    AUDIT Score: 3  Interpretation: Low risk alcohol consumption    Vision Screening    Right eye Left eye Both eyes   Without correction      With correction 20/40 20/40 20/50        Physical Exam:     /78   Pulse 63   Temp 98.3 °F (36.8 °C)   Ht 5' 1" (1.549 m)   Wt 78 kg (172 lb)   BMI 32.50 kg/m²     Physical Exam  Vitals and nursing note reviewed. Constitutional:       General: She is not in acute distress. Appearance: Normal appearance. She is well-developed. HENT:      Head: Normocephalic and atraumatic. Right Ear: Tympanic membrane, ear canal and external ear normal.      Left Ear: Tympanic membrane, ear canal and external ear normal.      Nose: Nose normal.      Mouth/Throat:      Mouth: Mucous membranes are moist.   Eyes:      Conjunctiva/sclera: Conjunctivae normal.      Pupils: Pupils are equal, round, and reactive to light. Cardiovascular:      Rate and Rhythm: Normal rate and regular rhythm. Heart sounds: Normal heart sounds. No murmur heard. Pulmonary:      Effort: Pulmonary effort is normal. No respiratory distress. Breath sounds: Normal breath sounds. Abdominal:      General: Bowel sounds are normal.      Palpations: Abdomen is soft. Tenderness: There is no abdominal tenderness. Musculoskeletal:         General: No swelling. Normal range of motion. Cervical back: Normal range of motion and neck supple. Skin:     General: Skin is warm and dry. Capillary Refill: Capillary refill takes less than 2 seconds. Neurological:      General: No focal deficit present. Mental Status: She is alert and oriented to person, place, and time.    Psychiatric:         Mood and Affect: Mood normal.         Behavior: Behavior normal.          Claire Dupree MD

## 2023-08-02 NOTE — PATIENT INSTRUCTIONS
Medicare Preventive Visit Patient Instructions  Thank you for completing your Welcome to Medicare Visit or Medicare Annual Wellness Visit today. Your next wellness visit will be due in one year (8/2/2024). The screening/preventive services that you may require over the next 5-10 years are detailed below. Some tests may not apply to you based off risk factors and/or age. Screening tests ordered at today's visit but not completed yet may show as past due. Also, please note that scanned in results may not display below. Preventive Screenings:  Service Recommendations Previous Testing/Comments   Colorectal Cancer Screening  * Colonoscopy    * Fecal Occult Blood Test (FOBT)/Fecal Immunochemical Test (FIT)  * Fecal DNA/Cologuard Test  * Flexible Sigmoidoscopy Age: 43-73 years old   Colonoscopy: every 10 years (may be performed more frequently if at higher risk)  OR  FOBT/FIT: every 1 year  OR  Cologuard: every 3 years  OR  Sigmoidoscopy: every 5 years  Screening may be recommended earlier than age 39 if at higher risk for colorectal cancer. Also, an individualized decision between you and your healthcare provider will decide whether screening between the ages of 77-80 would be appropriate. Colonoscopy: 06/13/2012  FOBT/FIT: Not on file  Cologuard: Not on file  Sigmoidoscopy: Not on file          Breast Cancer Screening Age: 36 years old  Frequency: every 1-2 years  Not required if history of left and right mastectomy Mammogram: 06/14/2023    Screening Current   Cervical Cancer Screening Between the ages of 21-29, pap smear recommended once every 3 years. Between the ages of 32-69, can perform pap smear with HPV co-testing every 5 years.    Recommendations may differ for women with a history of total hysterectomy, cervical cancer, or abnormal pap smears in past. Pap Smear: Not on file    Screening Not Indicated   Hepatitis C Screening Once for adults born between 1945 and 1965  More frequently in patients at high risk for Hepatitis C Hep C Antibody: 09/07/2018    Screening Current   Diabetes Screening 1-2 times per year if you're at risk for diabetes or have pre-diabetes Fasting glucose: No results in last 5 years (No results in last 5 years)  A1C: 6.2 % (7/26/2023)  Screening Current   Cholesterol Screening Once every 5 years if you don't have a lipid disorder. May order more often based on risk factors. Lipid panel: 07/26/2023    Screening Not Indicated  History Lipid Disorder     Other Preventive Screenings Covered by Medicare:  1. Abdominal Aortic Aneurysm (AAA) Screening: covered once if your at risk. You're considered to be at risk if you have a family history of AAA. 2. Lung Cancer Screening: covers low dose CT scan once per year if you meet all of the following conditions: (1) Age 48-67; (2) No signs or symptoms of lung cancer; (3) Current smoker or have quit smoking within the last 15 years; (4) You have a tobacco smoking history of at least 20 pack years (packs per day multiplied by number of years you smoked); (5) You get a written order from a healthcare provider. 3. Glaucoma Screening: covered annually if you're considered high risk: (1) You have diabetes OR (2) Family history of glaucoma OR (3)  aged 48 and older OR (3)  American aged 72 and older  3. Osteoporosis Screening: covered every 2 years if you meet one of the following conditions: (1) You're estrogen deficient and at risk for osteoporosis based off medical history and other findings; (2) Have a vertebral abnormality; (3) On glucocorticoid therapy for more than 3 months; (4) Have primary hyperparathyroidism; (5) On osteoporosis medications and need to assess response to drug therapy. · Last bone density test (DXA Scan): 11/05/2013. 5. HIV Screening: covered annually if you're between the age of 14-79. Also covered annually if you are younger than 13 and older than 72 with risk factors for HIV infection.  For pregnant patients, it is covered up to 3 times per pregnancy. Immunizations:  Immunization Recommendations   Influenza Vaccine Annual influenza vaccination during flu season is recommended for all persons aged >= 6 months who do not have contraindications   Pneumococcal Vaccine   * Pneumococcal conjugate vaccine = PCV13 (Prevnar 13), PCV15 (Vaxneuvance), PCV20 (Prevnar 20)  * Pneumococcal polysaccharide vaccine = PPSV23 (Pneumovax) Adults 20-63 years old: 1-3 doses may be recommended based on certain risk factors  Adults 72 years old: 1-2 doses may be recommended based off what pneumonia vaccine you previously received   Hepatitis B Vaccine 3 dose series if at intermediate or high risk (ex: diabetes, end stage renal disease, liver disease)   Tetanus (Td) Vaccine - COST NOT COVERED BY MEDICARE PART B Following completion of primary series, a booster dose should be given every 10 years to maintain immunity against tetanus. Td may also be given as tetanus wound prophylaxis. Tdap Vaccine - COST NOT COVERED BY MEDICARE PART B Recommended at least once for all adults. For pregnant patients, recommended with each pregnancy. Shingles Vaccine (Shingrix) - COST NOT COVERED BY MEDICARE PART B  2 shot series recommended in those aged 48 and above     Health Maintenance Due:      Topic Date Due   • Colorectal Cancer Screening  06/13/2022   • Breast Cancer Screening: Mammogram  06/14/2024   • Hepatitis C Screening  Completed     Immunizations Due:      Topic Date Due   • Pneumococcal Vaccine: 65+ Years (2 - PCV) 01/01/2001   • COVID-19 Vaccine (4 - Moderna series) 12/24/2021   • Influenza Vaccine (1) 09/01/2023     Advance Directives   What are advance directives? Advance directives are legal documents that state your wishes and plans for medical care. These plans are made ahead of time in case you lose your ability to make decisions for yourself.  Advance directives can apply to any medical decision, such as the treatments you want, and if you want to donate organs. What are the types of advance directives? There are many types of advance directives, and each state has rules about how to use them. You may choose a combination of any of the following:  · Living will: This is a written record of the treatment you want. You can also choose which treatments you do not want, which to limit, and which to stop at a certain time. This includes surgery, medicine, IV fluid, and tube feedings. · Durable power of  for healthcare [facility]): This is a written record that states who you want to make healthcare choices for you when you are unable to make them for yourself. This person, called a proxy, is usually a family member or a friend. You may choose more than 1 proxy. · Do not resuscitate (DNR) order:  A DNR order is used in case your heart stops beating or you stop breathing. It is a request not to have certain forms of treatment, such as CPR. A DNR order may be included in other types of advance directives. · Medical directive: This covers the care that you want if you are in a coma, near death, or unable to make decisions for yourself. You can list the treatments you want for each condition. Treatment may include pain medicine, surgery, blood transfusions, dialysis, IV or tube feedings, and a ventilator (breathing machine). · Values history: This document has questions about your views, beliefs, and how you feel and think about life. This information can help others choose the care that you would choose. Why are advance directives important? An advance directive helps you control your care. Although spoken wishes may be used, it is better to have your wishes written down. Spoken wishes can be misunderstood, or not followed. Treatments may be given even if you do not want them. An advance directive may make it easier for your family to make difficult choices about your care.    Weight Management   Why it is important to manage your weight:  Being overweight increases your risk of health conditions such as heart disease, high blood pressure, type 2 diabetes, and certain types of cancer. It can also increase your risk for osteoarthritis, sleep apnea, and other respiratory problems. Aim for a slow, steady weight loss. Even a small amount of weight loss can lower your risk of health problems. How to lose weight safely:  A safe and healthy way to lose weight is to eat fewer calories and get regular exercise. You can lose up about 1 pound a week by decreasing the number of calories you eat by 500 calories each day. Healthy meal plan for weight management:  A healthy meal plan includes a variety of foods, contains fewer calories, and helps you stay healthy. A healthy meal plan includes the following:  · Eat whole-grain foods more often. A healthy meal plan should contain fiber. Fiber is the part of grains, fruits, and vegetables that is not broken down by your body. Whole-grain foods are healthy and provide extra fiber in your diet. Some examples of whole-grain foods are whole-wheat breads and pastas, oatmeal, brown rice, and bulgur. · Eat a variety of vegetables every day. Include dark, leafy greens such as spinach, kale, luis greens, and mustard greens. Eat yellow and orange vegetables such as carrots, sweet potatoes, and winter squash. · Eat a variety of fruits every day. Choose fresh or canned fruit (canned in its own juice or light syrup) instead of juice. Fruit juice has very little or no fiber. · Eat low-fat dairy foods. Drink fat-free (skim) milk or 1% milk. Eat fat-free yogurt and low-fat cottage cheese. Try low-fat cheeses such as mozzarella and other reduced-fat cheeses. · Choose meat and other protein foods that are low in fat. Choose beans or other legumes such as split peas or lentils. Choose fish, skinless poultry (chicken or turkey), or lean cuts of red meat (beef or pork).  Before you cook meat or poultry, cut off any visible fat. · Use less fat and oil. Try baking foods instead of frying them. Add less fat, such as margarine, sour cream, regular salad dressing and mayonnaise to foods. Eat fewer high-fat foods. Some examples of high-fat foods include french fries, doughnuts, ice cream, and cakes. · Eat fewer sweets. Limit foods and drinks that are high in sugar. This includes candy, cookies, regular soda, and sweetened drinks. Exercise:  Exercise at least 30 minutes per day on most days of the week. Some examples of exercise include walking, biking, dancing, and swimming. You can also fit in more physical activity by taking the stairs instead of the elevator or parking farther away from stores. Ask your healthcare provider about the best exercise plan for you. Alcohol Use and Your Health    Drinking too much can harm your health. Excessive alcohol use leads to about 88,000 death in [facility] each year, and shortens the life of those who diet by almost 30 years. Further, excessive drinking cost the economy $249 billion in 2010. Most excessive drinkers are not alcohol dependent. Excessive alcohol use has immediate effects that increase the risk of many harmful health conditions. These are most often the result of binge drinking. Over time, excessive alcohol use can lead to the development of chronic diseases and other series health problems. What is considered a "drink"? Excessive alcohol use includes:  · Binge Drinking: For women, 4 or more drinks consumed on one occasion. For men, 5 or more drinks consumed on one occasion. · Heavy Drinking: For women, 8 or more drinks per week.  For men, 15 or more drinks per week  · Any alcohol used by pregnant women  · Any alcohol used by those under the age of 21 years    If you choose to drink, do so in moderation:  · Do not drink at all if you are under the age of 24, or if you are or may be pregnant, or have health problems that could be made worse by drinking. · For women, up to 1 drink per day  · For men, up to 2 drinks a day    No one should begin drinking or drink more frequently based on potential health benefits    Short-Term Health Risks:  · Injuries: motor vehicle crashes, falls, drownings, burns  · Violence: homicide, suicide, sexual assault, intimate partner violence  · Alcohol poisoning  · Reproductive health: risky sexual behaviors, unintended prengnacy, sexually transmitted diseases, miscarriage, stillbirth, fetal alcohol syndrome    Long-Term Health Risks:  · Chronic diseases: high blood pressure, heart disease, stroke, liver disease, digestive problems  · Cancers: breast, mouth and throat, liver, colon  · Learning and memory problems: dementia, poor school performance  · Mental health: depression, anxiety, insomnia  · Social problems: lost productivity, family problems, unemployment  · Alcohol dependence    For support and more information:  · Substance Abuse and [address]  Web Address: https://[employer]/    · Alcoholics Anonymous        Web Address: http://www.garcia.info/    https://www.cdc.gov/alcohol/fact-sheets/alcohol-use.htm     © Copyright [employer] 2018 Information is for End User's use only and may not be sold, redistributed or otherwise used for commercial purposes.  All illustrations and images included in CareNotes® are the copyrighted property of [employer] or [address] [place] [address]

## 2023-08-02 NOTE — ASSESSMENT & PLAN NOTE
Patient has been doing well. Has been on celexa 40 mg for many years. Agrees to trial of celexa 20 mg daily. Monitor syttmpoms.

## 2023-09-01 ENCOUNTER — TELEPHONE (OUTPATIENT)
Age: 73
End: 2023-09-01

## 2023-09-01 NOTE — TELEPHONE ENCOUNTER
Caller: Patient    Doctor: Dr. Gil Castillo    Reason for call: Patient calling stating she is scheduled with Dr. Gil Castillo for Right index finger trigger release. She had surgery approximately 11yrs ago with Dr. Isai Whitney on the same finger. She is wondering if trigger finger can return and if the previous surgery would have any affect on her upcoming surgery.   Patient asking to speak to clinical team.    Call back#: [phone]

## 2023-09-12 ENCOUNTER — TELEPHONE (OUTPATIENT)
Age: 73
End: 2023-09-12

## 2023-09-12 NOTE — TELEPHONE ENCOUNTER
Caller: Patient     Doctor: Tali Gordon     Reason for call: Patient has to cancel her surgery on 9/21. 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  Patient will call back to reschedule when she is able     Call back#: [phone]

## 2023-10-02 NOTE — TELEPHONE ENCOUNTER
Caller: patient    Doctor: Juan Manuel Church    Reason for call: patient is calling to cancel surgery on 11/30 and schedule it for May 2024.     Call back#: [phone]

## 2023-10-05 ENCOUNTER — HOSPITAL ENCOUNTER (OUTPATIENT)
Dept: RADIOLOGY | Facility: HOSPITAL | Age: 73
End: 2023-10-05
Payer: COMMERCIAL

## 2023-10-05 ENCOUNTER — OFFICE VISIT (OUTPATIENT)
Dept: FAMILY MEDICINE CLINIC | Facility: HOSPITAL | Age: 73
End: 2023-10-05
Payer: COMMERCIAL

## 2023-10-05 VITALS — TEMPERATURE: 98.1 F | HEART RATE: 70 BPM | HEIGHT: 61 IN | BODY MASS INDEX: 32.17 KG/M2 | WEIGHT: 170.4 LBS

## 2023-10-05 DIAGNOSIS — R07.9 CHRONIC CHEST PAIN: ICD-10-CM

## 2023-10-05 DIAGNOSIS — R06.02 SHORTNESS OF BREATH: Primary | ICD-10-CM

## 2023-10-05 DIAGNOSIS — G89.29 CHRONIC CHEST PAIN: ICD-10-CM

## 2023-10-05 DIAGNOSIS — R06.02 SHORTNESS OF BREATH: ICD-10-CM

## 2023-10-05 DIAGNOSIS — K21.9 GASTROESOPHAGEAL REFLUX DISEASE WITHOUT ESOPHAGITIS: ICD-10-CM

## 2023-10-05 DIAGNOSIS — R13.10 DYSPHAGIA, UNSPECIFIED TYPE: ICD-10-CM

## 2023-10-05 DIAGNOSIS — K44.9 HIATAL HERNIA: ICD-10-CM

## 2023-10-05 PROCEDURE — 99214 OFFICE O/P EST MOD 30 MIN: CPT | Performed by: NURSE PRACTITIONER

## 2023-10-05 PROCEDURE — 71046 X-RAY EXAM CHEST 2 VIEWS: CPT

## 2023-10-05 NOTE — PROGRESS NOTES
Name: Concepcion Green      :       MRN: [id number]  Encounter Provider: GERMAN Bradshaw  Encounter Date: 10/5/2023   Encounter department: [address]     1. Shortness of breath  -     XR chest pa & lateral; Future; Expected date: 10/05/2023    2. Dysphagia, unspecified type  -     Ambulatory Referral to Gastroenterology; Future    3. Chronic chest pain  -     XR chest pa & lateral; Future; Expected date: 10/05/2023    4. Gastroesophageal reflux disease without esophagitis    5. Hiatal hernia      Respiratory sx's sound bronchial/spastic during swallowing, likely due to known GERD and hiatal hernia  Will update CXR as she requests  Consult entered to pursue further evaluation w/GI - likely needs repeat EGD  Continue daily pantoprazole as rx'd       Subjective      States she has been previously diagnosed w/RYAN, GERD and hiatal hernia. States she recently experienced 2 episodes of respiratory distress with swallowing a sip of water. Was unable to catch her air. She was sitting upright and laid down afterwards. Had numb arms and tingly fingers when this happened. States she is always burping no matter what she eats and drinks. Last episode happened last week. Has had pain in left side of chest for 4 years and diagnosed with costochondritis - states she has this all the time. Takes pantoprazole daily. No choking or difficulty swallowing otherwise. HH last checked in . Review of Systems   Constitutional:  Positive for appetite change (eats small frequent meals). Negative for unexpected weight change. HENT:  Negative for trouble swallowing. Respiratory:  Positive for shortness of breath (x2 episodes w/swallowing water). Negative for choking. Cardiovascular:  Positive for chest pain ("always feel full" on left. x4 years, evaluated by Dr Lona Krishnamurthy w/CXR and echo in  (both normal), notes pain w/movement (ie, lifting windows)). Psychiatric/Behavioral:  The patient is nervous/anxious. Current Outpatient Medications on File Prior to Visit   Medication Sig    albuterol (2.5 mg/3 mL) 0.083 % nebulizer solution Take 3 mL (2.5 mg total) by nebulization every 6 (six) hours as needed for wheezing or shortness of breath    albuterol (ProAir HFA) 90 mcg/act inhaler Inhale 2 puffs every 6 (six) hours as needed for wheezing    amLODIPine (NORVASC) 5 mg tablet Take 1 tablet (5 mg total) by mouth daily    aspirin 81 mg chewable tablet Chew 1 tablet daily    cholecalciferol (VITAMIN D3) 1,000 units tablet Take 1,000 Units by mouth daily    citalopram (CeleXA) 20 mg tablet Take 1 tablet (20 mg total) by mouth daily    diphenoxylate-atropine (LOMOTIL) 2.5-0.025 mg per tablet Take 1 tablet by mouth daily as needed (as needed)    Fluticasone Propionate, Inhal, (Flovent Diskus) 100 MCG/BLIST AEPB Inhale 1 puff 2 (two) times a day Rinse mouth after use. Lactobacillus (Acidophilus) 100 MG CAPS Take by mouth    levothyroxine 25 mcg tablet Take 1 tablet (25 mcg total) by mouth daily    meloxicam (MOBIC) 15 mg tablet Take 1 tablet (15 mg total) by mouth daily    Multiple Vitamin (MULTIVITAMIN) tablet Take 1 tablet by mouth daily    pantoprazole (PROTONIX) 40 mg tablet Take 1 tablet (40 mg total) by mouth daily    pravastatin (PRAVACHOL) 10 mg tablet Take 1 tablet (10 mg total) by mouth daily at bedtime    silver sulfadiazine (SILVADENE,SSD) 1 % cream Apply topically daily       Objective     Pulse 70   Temp 98.1 °F (36.7 °C)   Ht 5' 1" (1.549 m)   Wt 77.3 kg (170 lb 6.4 oz)   BMI 32.20 kg/m²       Physical Exam  Vitals reviewed. Constitutional:       General: She is not in acute distress. Appearance: Normal appearance. HENT:      Head: Normocephalic. Cardiovascular:      Rate and Rhythm: Normal rate and regular rhythm. Heart sounds: No murmur heard. Pulmonary:      Effort: Pulmonary effort is normal. No respiratory distress. Breath sounds: Normal breath sounds. Comments: No cough  Skin:     General: Skin is warm and dry. Neurological:      General: No focal deficit present. Mental Status: She is alert and oriented to person, place, and time. Psychiatric:         Mood and Affect: Mood is anxious.          Behavior: Behavior normal.         GERMAN Medina

## 2023-10-09 ENCOUNTER — HOSPITAL ENCOUNTER (OUTPATIENT)
Dept: ULTRASOUND IMAGING | Facility: HOSPITAL | Age: 73
Discharge: HOME | End: 2023-10-09

## 2023-10-12 ENCOUNTER — OFFICE VISIT (OUTPATIENT)
Dept: GASTROENTEROLOGY | Facility: CLINIC | Age: 73
End: 2023-10-12
Payer: COMMERCIAL

## 2023-10-12 VITALS
SYSTOLIC BLOOD PRESSURE: 130 MMHG | HEIGHT: 61 IN | WEIGHT: 174 LBS | TEMPERATURE: 97.2 F | BODY MASS INDEX: 32.85 KG/M2 | DIASTOLIC BLOOD PRESSURE: 70 MMHG

## 2023-10-12 DIAGNOSIS — R68.81 EARLY SATIETY: ICD-10-CM

## 2023-10-12 DIAGNOSIS — R13.10 DYSPHAGIA, UNSPECIFIED TYPE: Primary | ICD-10-CM

## 2023-10-12 DIAGNOSIS — R14.2 BELCHING: ICD-10-CM

## 2023-10-12 DIAGNOSIS — K21.9 GASTROESOPHAGEAL REFLUX DISEASE WITHOUT ESOPHAGITIS: ICD-10-CM

## 2023-10-12 DIAGNOSIS — Z12.11 COLON CANCER SCREENING: ICD-10-CM

## 2023-10-12 PROCEDURE — 99204 OFFICE O/P NEW MOD 45 MIN: CPT | Performed by: PHYSICIAN ASSISTANT

## 2023-10-12 NOTE — PATIENT INSTRUCTIONS
Scheduled date of EGD(as of today): 10/13/2023  Physician performing EGD: Dr. Rosas Clark   Location of EGD: [facility]  Instructions reviewed with patient by: Jian Rosas   Clearances:  N/A

## 2023-10-12 NOTE — PROGRESS NOTES
[facility] - Outpatient Consultation  Dakotah Carey 74 y.o. female MRN: [id number]  Encounter: [id number]          ASSESSMENT AND PLAN:      1. Dysphagia, unspecified type  2. Gastroesophageal reflux disease without esophagitis  3. Early satiety  4. Belching  She has history of chronic GERD w/ hiatal hernia, frequent belching, early satiety, and now difficulty swallowing pills. She also describes coughing/choking and feeling like liquid "goes down the wrong pipe."  She had 2 episodes where she was gasping for air during the nighttime, unclear if this is related to RYAN, aspiration, panic attack, etc. Plan for modified barium swallow to rule out oropharyngeal dysphagia. Schedule EGD to rule out structural and mucosal abnormalities. If work-up is unremarkable, consider 24 hour pH and manometry testing to evaluate for pathologic reflux and dysmotility, as patient expresses potential interest in hernia repair in the future. Continue pantoprazole 40 mg daily. Continue GERD diet and lifestyle precautions. - Ambulatory Referral to Gastroenterology  - FL barium swallow video w speech; Future  - EGD; Future    5. Colon cancer screening  She had normal colonoscopy in 2012 and does not wish to have another one. I explained we could miss colon polyps/colon cancer by not doing colonoscopy. She expressed understanding of this risk. Follow-up in 3 months  ______________________________________________________________________    HPI: 78-year-old female with history of mild intermittent asthma, impaired fasting glucose, hypothyroidism, hypertension, hyperlipidemia, costochondritis, obstructive sleep apnea referred for dysphagia. She describes difficulty swallowing pills. She feels the pills get stuck in her chest and she must wait until it passes. This can be painful. She feels like her esophagus is narrowed. She denies difficulty swallowing solid food.      She also describes difficulty initiating a swallow. This occurs with liquids. She feels like they go down the wrong pipe at times. Some associated choking/coughing. She has obstructive sleep apnea but does not use CPAP. On 2 separate occasions during the night, she recalls noticing a sudden change in her breathing and suddenly she starts gasping for air. She can inhale but feels like she cannot exhale. This triggers panic. She is unsure if this is related to her hiatal hernia. She has frequent belching and occasional regurgitation but denies heartburn. She has been on pantoprazole 40 mg daily for years. No changes in bowel habits, rectal bleeding, or abnormal weight loss. She had EGD in 2018 for GERD which showed 3 cm hiatal hernia and possible gastritis. No biopsy taken. Her last colonoscopy was normal in 2012. She does not want another one. Answers submitted by the patient for this visit:  Abdominal Pain Questionnaire (Submitted on 10/8/2023)  Chief Complaint: Abdominal pain  anorexia: No  arthralgias: No  belching: Yes  constipation: No  diarrhea: No  dysuria: No  fever: No  flatus: No  frequency: No  headaches: No  hematochezia: No  hematuria: No  melena: No  myalgias: No  nausea: No  weight loss: No        REVIEW OF SYSTEMS:    CONSTITUTIONAL: Denies any fever, chills, rigors, and weight loss. HEENT: No earache or tinnitus. Denies hearing loss or visual disturbances. CARDIOVASCULAR: No chest pain or palpitations. RESPIRATORY: Denies any cough, hemoptysis, shortness of breath or dyspnea on exertion. GASTROINTESTINAL: As noted in the History of Present Illness. GENITOURINARY: No problems with urination. Denies any hematuria or dysuria. NEUROLOGIC: No dizziness or vertigo, denies headaches. MUSCULOSKELETAL: Denies any muscle or joint pain. SKIN: Denies skin rashes or itching. ENDOCRINE: Denies excessive thirst. Denies intolerance to heat or cold. PSYCHOSOCIAL: Denies depression or anxiety.  Denies any recent memory loss.       Historical Information   Past Medical History:   Diagnosis Date    Allergic 1987    Pneumovax and Roses    Anxiety     Asthma     Atypical chest pain 06/11/2020    Burping 7/17/2018    Cat bite of hand 06/29/2021    Chronic fatigue 9/5/2018    Disease of thyroid gland     GERD (gastroesophageal reflux disease)     Hearing loss     Heart murmur     Medicare annual wellness visit, subsequent 01/05/2021    Migraine     Peripheral vertigo     Plantar fasciitis 2018 car[facility]encita    Had Cortesone injection and wore a walking boot  for 6ish weeks. Right foot. Raynaud disease     S/P trigger finger release     Skin tag 2017 carmencita    PCP removed it. Sleep apnea     Strain of left trapezius muscle 07/19/2019    Tinea corporis 1984    Was treated and had never returned. Varicella 1955    Mild case as a young child    Visual disturbance      Past Surgical History:   Procedure Laterality Date    CATARACT EXTRACTION      COLON SURGERY      EYE SURGERY  2019    GLAUCOMA SURGERY      HALLUX VALGUS CORRECTION Right 06/19/2007    INCISION TENDON SHEATH HAND  05/23/2011    Release of A1 pulley    KNEE ARTHROSCOPY W/ MENISCAL REPAIR Left 2003    KNEE SURGERY  2005    MS ESOPHAGOGASTRODUODENOSCOPY TRANSORAL DIAGNOSTIC N/A 08/22/2018    Procedure: ESOPHAGOGASTRODUODENOSCOPY (EGD); Surgeon: Sharyn Garcia MD;  Location: [facility];  Service: Gastroenterology    MS TENDON SHEATH INCISION Left 07/11/2019    Procedure: RELEASE TRIGGER FINGER left ring;  Surgeon: Vickey Camarena MD;  Location: [facility];  Service: Orthopedics    TOENAIL EXCISION  2005 car[facility]ita    Left  great and left pinky toes.     TONSILLECTOMY  1953    TRIGGER FINGER RELEASE      TUBAL LIGATION  1977    UPPER GASTROINTESTINAL ENDOSCOPY       Social History   Social History     Substance and Sexual Activity   Alcohol Use Yes    Alcohol/week: 3.0 standard drinks of alcohol    Types: 3 Cans of beer per week    Comment: Per week     Social History     Substance and Sexual Activity   Drug Use Never     Social History     Tobacco Use   Smoking Status Never   Smokeless Tobacco Never     Family History   Problem Relation Age of Onset    Lung cancer Mother     Cancer Mother         Lung    Stomach cancer Father     Cancer Father         Stomach    No Known Problems Sister     No Known Problems Daughter     Other Maternal Grandmother         Stroke syndrome    No Known Problems Maternal Grandfather     No Known Problems Paternal Grandmother     No Known Problems Paternal Grandfather     Cancer Brother         Respiratory Disease    Other Maternal Aunt         Cardiomegaly    Diabetes Maternal Aunt         Severe diabetic    Cancer Maternal Aunt         Severe Diabetic    Heart disease Maternal Aunt         Enlarged Heart    No Known Problems Paternal Aunt     No Known Problems Paternal Aunt     No Known Problems Paternal Aunt        Meds/Allergies       Current Outpatient Medications:     albuterol (2.5 mg/3 mL) 0.083 % nebulizer solution    albuterol (ProAir HFA) 90 mcg/act inhaler    amLODIPine (NORVASC) 5 mg tablet    aspirin 81 mg chewable tablet    cholecalciferol (VITAMIN D3) 1,000 units tablet    citalopram (CeleXA) 20 mg tablet    diphenoxylate-atropine (LOMOTIL) 2.5-0.025 mg per tablet    Lactobacillus (Acidophilus) 100 MG CAPS    levothyroxine 25 mcg tablet    meloxicam (MOBIC) 15 mg tablet    Multiple Vitamin (MULTIVITAMIN) tablet    pantoprazole (PROTONIX) 40 mg tablet    pravastatin (PRAVACHOL) 10 mg tablet    silver sulfadiazine (SILVADENE,SSD) 1 % cream    Fluticasone Propionate, Inhal, (Flovent Diskus) 100 MCG/BLIST AEPB    Allergies   Allergen Reactions    Pneumococcal Polysaccharide Vaccine Fever     Other reaction(s): Chills, fever and mottling  Category: Allergy;      Corrie Allergic Rhinitis, Headache, Nasal Congestion and Visual Disturbance           Objective     Blood pressure 130/70, temperature (!) 97.2 °F (36.2 °C), temperature source Tympanic, height 5' 1" (1.549 m), weight 78.9 kg (174 lb). Body mass index is 32.88 kg/m². PHYSICAL EXAM:      General Appearance:   Alert, cooperative, no distress   HEENT:   Normocephalic, atraumatic, anicteric. Neck:  Supple, symmetrical, trachea midline   Lungs:   Clear to auscultation bilaterally; no rales, rhonchi or wheezing; respirations unlabored    Heart[de-identified]   Regular rate and rhythm; no murmur, rub, or gallop. Abdomen:   Soft, non-tender, non-distended; normal bowel sounds; no masses, no organomegaly    Genitalia:   Deferred    Rectal:   Deferred    Extremities:  No cyanosis, clubbing or edema    Pulses:  2+ and symmetric    Skin:  No jaundice, rashes, or lesions    Lymph nodes:  No palpable cervical lymphadenopathy        Lab Results:   No visits with results within 1 Day(s) from this visit.    Latest known visit with results is:   Office Visit on 01/20/2023   Component Date Value    White Blood Cell Count 07/26/2023 7.0     Red Blood Cell Count 07/26/2023 4.35     Hemoglobin 07/26/2023 13.4     HCT 07/26/2023 40.6     MCV 07/26/2023 93     MCH 07/26/2023 30.8     MCHC 07/26/2023 33.0     RDW 07/26/2023 12.2     Platelet Count 83/76/7067 256     Glucose, Random 07/26/2023 112 (H)     BUN 07/26/2023 11     Creatinine 07/26/2023 0.76     eGFR 07/26/2023 82     SL AMB BUN/CREATININE RA* 07/26/2023 14     Sodium 07/26/2023 142     Potassium 07/26/2023 5.0     Chloride 07/26/2023 102     CO2 07/26/2023 22     CALCIUM 07/26/2023 10.3     Protein, Total 07/26/2023 7.1     Albumin 07/26/2023 4.7     Globulin, Total 07/26/2023 2.4     Albumin/Globulin Ratio 07/26/2023 2.0     TOTAL BILIRUBIN 07/26/2023 0.3     Alk Phos Isoenzymes 07/26/2023 46     AST 07/26/2023 22     ALT 07/26/2023 16     Hemoglobin A1C 07/26/2023 6.2 (H)     Estimated Average Glucose 07/26/2023 131     Cholesterol, Total 07/26/2023 175     Triglycerides 07/26/2023 100     HDL 07/26/2023 70     VLDL Cholesterol Calcula* 07/26/2023 18     LDL Calculated 07/26/2023 87     LDl/HDL Ratio 07/26/2023 1.2     TSH 07/26/2023 3.550          Radiology Results:   XR chest pa & lateral    Result Date: 10/8/2023  Narrative: CHEST INDICATION:   R06.02: Shortness of breath R07.9: Chest pain, unspecified G89.29: Other chronic pain. Left chest pain. COMPARISON: CXR 10/23/2020 and chest CT 3/21/2014. EXAM PERFORMED/VIEWS:  XR CHEST PA & LATERAL. DUAL ENERGY SUBTRACTION. FINDINGS: Cardiomediastinal silhouette normal. Lungs clear. No effusion or pneumothorax. Upper abdomen normal.  Bones normal for age. Impression: No acute cardiopulmonary disease.  Workstation performed: [id number]

## 2023-10-13 ENCOUNTER — ANESTHESIA (OUTPATIENT)
Dept: GASTROENTEROLOGY | Facility: HOSPITAL | Age: 73
End: 2023-10-13

## 2023-10-13 ENCOUNTER — ANESTHESIA EVENT (OUTPATIENT)
Dept: GASTROENTEROLOGY | Facility: HOSPITAL | Age: 73
End: 2023-10-13

## 2023-10-13 ENCOUNTER — HOSPITAL ENCOUNTER (OUTPATIENT)
Dept: GASTROENTEROLOGY | Facility: HOSPITAL | Age: 73
Setting detail: OUTPATIENT SURGERY
End: 2023-10-13
Payer: COMMERCIAL

## 2023-10-13 VITALS
DIASTOLIC BLOOD PRESSURE: 65 MMHG | RESPIRATION RATE: 20 BRPM | OXYGEN SATURATION: 96 % | TEMPERATURE: 97.7 F | HEART RATE: 59 BPM | HEIGHT: 62 IN | BODY MASS INDEX: 32.09 KG/M2 | SYSTOLIC BLOOD PRESSURE: 167 MMHG | WEIGHT: 174.4 LBS

## 2023-10-13 DIAGNOSIS — R14.2 BELCHING: ICD-10-CM

## 2023-10-13 DIAGNOSIS — R68.81 EARLY SATIETY: ICD-10-CM

## 2023-10-13 DIAGNOSIS — K21.9 GASTROESOPHAGEAL REFLUX DISEASE WITHOUT ESOPHAGITIS: ICD-10-CM

## 2023-10-13 DIAGNOSIS — R13.10 DYSPHAGIA, UNSPECIFIED TYPE: ICD-10-CM

## 2023-10-13 PROCEDURE — 88341 IMHCHEM/IMCYTCHM EA ADD ANTB: CPT | Performed by: PATHOLOGY

## 2023-10-13 PROCEDURE — 88342 IMHCHEM/IMCYTCHM 1ST ANTB: CPT | Performed by: PATHOLOGY

## 2023-10-13 PROCEDURE — 88305 TISSUE EXAM BY PATHOLOGIST: CPT | Performed by: PATHOLOGY

## 2023-10-13 RX ORDER — PROPOFOL 10 MG/ML
INJECTION, EMULSION INTRAVENOUS AS NEEDED
Status: DISCONTINUED | OUTPATIENT
Start: 2023-10-13 | End: 2023-10-13

## 2023-10-13 RX ORDER — SODIUM CHLORIDE, SODIUM LACTATE, POTASSIUM CHLORIDE, CALCIUM CHLORIDE 600; 310; 30; 20 MG/100ML; MG/100ML; MG/100ML; MG/100ML
INJECTION, SOLUTION INTRAVENOUS CONTINUOUS PRN
Status: DISCONTINUED | OUTPATIENT
Start: 2023-10-13 | End: 2023-10-13

## 2023-10-13 RX ORDER — IBUPROFEN 200 MG
200 TABLET ORAL EVERY 6 HOURS PRN
COMMUNITY

## 2023-10-13 RX ORDER — LIDOCAINE HYDROCHLORIDE 10 MG/ML
INJECTION, SOLUTION EPIDURAL; INFILTRATION; INTRACAUDAL; PERINEURAL AS NEEDED
Status: DISCONTINUED | OUTPATIENT
Start: 2023-10-13 | End: 2023-10-13

## 2023-10-13 RX ADMIN — LIDOCAINE HYDROCHLORIDE 50 MG: 10 INJECTION, SOLUTION EPIDURAL; INFILTRATION; INTRACAUDAL; PERINEURAL at 13:18

## 2023-10-13 RX ADMIN — SODIUM CHLORIDE, SODIUM LACTATE, POTASSIUM CHLORIDE, AND CALCIUM CHLORIDE: .6; .31; .03; .02 INJECTION, SOLUTION INTRAVENOUS at 13:11

## 2023-10-13 RX ADMIN — PROPOFOL 30 MG: 10 INJECTION, EMULSION INTRAVENOUS at 13:22

## 2023-10-13 RX ADMIN — PROPOFOL 120 MG: 10 INJECTION, EMULSION INTRAVENOUS at 13:18

## 2023-10-13 NOTE — ANESTHESIA PREPROCEDURE EVALUATION
Procedure:  EGD    Relevant Problems   CARDIO   (+) Hyperlipidemia   (+) Hypertension      ENDO   (+) Hypothyroidism      GI/HEPATIC   (+) Gastroesophageal reflux disease   (+) Hiatal hernia   (+) Oropharyngeal dysphagia      MUSCULOSKELETAL   (+) Hiatal hernia      NEURO/PSYCH   (+) Anxiety      PULMONARY   (+) Mild intermittent asthma   (+) Obstructive sleep apnea        Physical Exam    Airway    Mallampati score: III  TM Distance: >3 FB  Neck ROM: full     Dental   No notable dental hx     Cardiovascular      Pulmonary      Other Findings        Anesthesia Plan  ASA Score- 3     Anesthesia Type- IV sedation with anesthesia with ASA Monitors. Additional Monitors:     Airway Plan:            Plan Factors-    Chart reviewed. Patient summary reviewed. Patient is not a current smoker. Induction- intravenous. Postoperative Plan-     Informed Consent- Anesthetic plan and risks discussed with patient. I personally reviewed this patient with the CRNA. Discussed and agreed on the Anesthesia Plan with the CRNA. Lindsey Zee

## 2023-10-13 NOTE — ANESTHESIA POSTPROCEDURE EVALUATION
Post-Op Assessment Note    CV Status:  Stable  Pain Score: 0    Pain management: adequate     Mental Status:  Alert and awake   Hydration Status:  Euvolemic   PONV Controlled:  Controlled   Airway Patency:  Patent      Post Op Vitals Reviewed: Yes      Staff: CRNA         No notable events documented.     /65 (10/13/23 1331)    Temp   97.5   Pulse 59 (10/13/23 1331)   Resp (!) 28 (10/13/23 1331)    SpO2 96 % (10/13/23 1331)

## 2023-10-18 ENCOUNTER — HOSPITAL ENCOUNTER (OUTPATIENT)
Dept: RADIOLOGY | Facility: HOSPITAL | Age: 73
Discharge: HOME | End: 2023-10-18
Payer: COMMERCIAL

## 2023-10-18 DIAGNOSIS — R13.10 DYSPHAGIA, UNSPECIFIED TYPE: ICD-10-CM

## 2023-10-18 PROCEDURE — 88341 IMHCHEM/IMCYTCHM EA ADD ANTB: CPT | Performed by: PATHOLOGY

## 2023-10-18 PROCEDURE — 92611 MOTION FLUOROSCOPY/SWALLOW: CPT

## 2023-10-18 PROCEDURE — 74230 X-RAY XM SWLNG FUNCJ C+: CPT

## 2023-10-18 PROCEDURE — 88305 TISSUE EXAM BY PATHOLOGIST: CPT | Performed by: PATHOLOGY

## 2023-10-18 PROCEDURE — 88342 IMHCHEM/IMCYTCHM 1ST ANTB: CPT | Performed by: PATHOLOGY

## 2023-10-18 NOTE — PROCEDURES
Video Swallow Study      Patient Name: Reva Carpio  [id number] Date: 10/18/2023        Past Medical History  Past Medical History:   Diagnosis Date    Allergic 1987    Pneumovax and Roses    Anxiety     Asthma     Atypical chest pain 06/11/2020    Burping 7/17/2018    Cat bite of hand 06/29/2021    Chronic fatigue 9/5/2018    Disease of thyroid gland     GERD (gastroesophageal reflux disease)     Hearing loss     Heart murmur     Medicare annual wellness visit, subsequent 01/05/2021    Migraine     Peripheral vertigo     Plantar fasciitis 2018 car[facility]enc[facility]    Had Cortesone injection and wore a walking boot  for 6ish weeks. Right foot. Raynaud disease     S/P trigger finger release     Skin tag 2017 carmencita    PCP removed it. Sleep apnea     Strain of left trapezius muscle 07/19/2019    Tinea corporis 1984    Was treated and had never returned. Varicella 1955    Mild case as a young child    Visual disturbance         Past Surgical History  Past Surgical History:   Procedure Laterality Date    CATARACT EXTRACTION      COLON SURGERY      EYE SURGERY  2019    GLAUCOMA SURGERY      HALLUX VALGUS CORRECTION Right 06/19/2007    INCISION TENDON SHEATH HAND  05/23/2011    Release of A1 pulley    KNEE ARTHROSCOPY W/ MENISCAL REPAIR Left 2003    KNEE SURGERY  2005    IN ESOPHAGOGASTRODUODENOSCOPY TRANSORAL DIAGNOSTIC N/A 08/22/2018    Procedure: ESOPHAGOGASTRODUODENOSCOPY (EGD); Surgeon: Josephine Fajardo MD;  Location: [facility];  Service: Gastroenterology    IN TENDON SHEATH INCISION Left 07/11/2019    Procedure: RELEASE TRIGGER FINGER left ring;  Surgeon: Adrian Argueta MD;  Location: [facility];  Service: Orthopedics    TOENAIL EXCISION  2005 [facility]    Left  great and left pinky toes. TONSILLECTOMY  1953    TRIGGER FINGER RELEASE      TUBAL LIGATION  1977    UPPER GASTROINTESTINAL ENDOSCOPY         Modified (Video) Barium Swallow Study    Summary:  Images are on PACS for review.      Pt presents w/ min-mild pharyngeal dysphagia, ultimately functional at this time. Good oral control and timely/effective mastication and oral organization. Swallow initiation is delayed w/ bolus head at times reaching the pyriforms prior to reduced anterior hyoid excursion. Complete laryngeal elevation and laryngeal vestibule closure. No penetration/aspiration on today's study. PES relaxation is min reduced, ?small bony growths impacting full distention. No significant retention during gross esophageal screening. MBSS findings and recommendations immediately reviewed w/ pt and pt's  w/ use of images to aid in understanding. Education provided on strategies to optimize swallow safety, including the importance of oral care and s/s aspiration to monitor for and notify medical team of should they arise. Pt verbalized understanding and denied questions at this time. Recommendations:  Diet: Regular   Liquids: Thin   Meds: Whole w/ liquid   Strategies: -   Frequent oral care  Upright position  F/u ST tx: No ST f/u indicated at this time   Aspiration Precautions  Reflux Precautions  Consider consult with: -  Results reviewed with: pt, spouse  Repeat MBS as necessary  If a dedicated assessment of the esophagus is desired, consider esophagram/barium swallow or EGD. Goals:  Pt will tolerate least restrictive diet w/out s/s aspiration or oral/pharyngeal difficulties. Patient's goal: "It was very scary to have that happen to me. I want to see what's going on"       H&P/pertinent provider notes: ([address] noted above)  Per referring provider, Brittany Ludwig PA-C (gastroenterology) 10/12/23:  "15-year-old female with history of mild intermittent asthma, impaired fasting glucose, hypothyroidism, hypertension, hyperlipidemia, costochondritis, obstructive sleep apnea referred for dysphagia. She describes difficulty swallowing pills. She feels the pills get stuck in her chest and she must wait until it passes. This can be painful. She feels like her esophagus is narrowed. She denies difficulty swallowing solid food. She also describes difficulty initiating a swallow. This occurs with liquids. She feels like they go down the wrong pipe at times. Some associated choking/coughing. She has obstructive sleep apnea but does not use CPAP. On 2 separate occasions during the night, she recalls noticing a sudden change in her breathing and suddenly she starts gasping for air. She can inhale but feels like she cannot exhale. This triggers panic. She is unsure if this is related to her hiatal hernia. She has frequent belching and occasional regurgitation but denies heartburn. She has been on pantoprazole 40 mg daily for years. No changes in bowel habits, rectal bleeding, or abnormal weight loss. She had EGD in 2018 for GERD which showed 3 cm hiatal hernia and possible gastritis. No biopsy taken. Her last colonoscopy was normal in 2012. She does not want another one"    Special Studies:  CXR 10/5: No acute cardiopulmonary disease. Previous VBS:  No/none on record       Does the pt have pain? No   If yes, was nursing made aware/was it addressed? N/A     Swallow Mechanism Exam  Facial: symmetrical  Labial: WFL  Lingual: WFL  Velum: symmetrical  Mandible: adequate ROM  Dentition: adequate  Vocal quality:clear/adequate   Volitional Cough: strong/productive   Respiratory Status: on RA     Swallow Information   Current Risks for Dysphagia & Aspiration:  h/o esophageal dysphagia  Current Symptoms/Concerns: coughing with po  Current Diet: regular diet and thin liquids   Baseline Diet: regular diet and thin liquids      Consistencies Administered:  Pt was viewed sitting upright in the lateral and AP positions.  Trials administered were consistent with [facility] Valid[facility] Protocol: 5-mL thin liquid x2, 20-mL cup sip thin, 40-mL sequential swallow thin, 5-mL nectar thick, 20-mL cup sip nectar thick, 40-mL sequential swallow nectar thick, 5-mL Honey thick, 5-mL pudding, ½ cookie coated with 3-mL pudding, 5-mL nectar thick in the AP position, and 5-mL pudding in the AP position. Pt was also given thin liquids by straw, as well as a barium tablet with thin liquids. Oral Impairment:  Lip Closure: complete   Tongue Control During Bolus Hold: complete   Bolus Preparation/Mastication: timely/effective   Bolus Transport/Lingual Motion: brisk    Oral Residue: trace   Initiation of the Pharyngeal Swallow: delayed w/ bolus head to the pyriforms     Pharyngeal Impairment:  Soft Palate Elevation: complete   Laryngeal Elevation: complete   Anterior Hyoid Excursion: mod reduced   Epiglottic Movement: fairly complete    Laryngeal Vestibular Closure: complete   Pharyngeal Stripping Wave: complete   Pharyngeal Contraction: complete   PES Opening: min obstruction, ?  Small osteophytes impeding    Tongue Base Retraction: complete   Pharyngeal Residue: trace, WFL     Screening of Esophageal Impairment   Esophageal Clearance: complete       Penetration/Aspiration:  Thin: no penetration/aspiration (PAS 1)   Nectar: no penetration/aspiration (PAS 1)  Honey: no penetration/aspiration (PAS 1)  Puree: no penetration/aspiration (PAS 1)  Solid: no penetration/aspiration (PAS 1)  Response to Aspiration: N/A    Strategies/Efficacy: -     8-Point Penetration-Aspiration Scale   1 Material does not enter the airway   2 Material enters the airway, remains above the vocal folds, and is ejected  from the  airway    3 Material enters the airway, remains above the vocal folds, and is not ejected from the airway   4 Material enters the airway, contacts the vocal folds, and is ejected from the airway   5 Material enters the airway, contacts the vocal folds, and is not ejected from the airway    6 Material enters the airway, passes below the vocal folds and is ejected into the larynx or out of the airway    7 Material enters the airway, passes below the vocal folds, and is not ejected from the trachea despite effort    8 Material enters the airway, passes below the vocal folds, and no effort is made to eject

## 2023-10-19 ENCOUNTER — PATIENT MESSAGE (OUTPATIENT)
Dept: GASTROENTEROLOGY | Facility: CLINIC | Age: 73
End: 2023-10-19

## 2023-10-20 ENCOUNTER — TELEPHONE (OUTPATIENT)
Age: 73
End: 2023-10-20

## 2023-10-23 ENCOUNTER — OFFICE VISIT (OUTPATIENT)
Dept: GASTROENTEROLOGY | Facility: CLINIC | Age: 73
End: 2023-10-23
Payer: COMMERCIAL

## 2023-10-23 VITALS
BODY MASS INDEX: 31.47 KG/M2 | SYSTOLIC BLOOD PRESSURE: 152 MMHG | WEIGHT: 171 LBS | HEART RATE: 64 BPM | HEIGHT: 62 IN | DIASTOLIC BLOOD PRESSURE: 62 MMHG

## 2023-10-23 DIAGNOSIS — K21.9 GASTROESOPHAGEAL REFLUX DISEASE WITHOUT ESOPHAGITIS: ICD-10-CM

## 2023-10-23 DIAGNOSIS — R13.12 OROPHARYNGEAL DYSPHAGIA: Primary | ICD-10-CM

## 2023-10-23 DIAGNOSIS — K44.9 HIATAL HERNIA: ICD-10-CM

## 2023-10-23 PROCEDURE — 99214 OFFICE O/P EST MOD 30 MIN: CPT | Performed by: INTERNAL MEDICINE

## 2023-10-23 NOTE — PROGRESS NOTES
[facility] - Outpatient Consultation  Thomas Lowry 74 y.o. female MRN: [id number]  Encounter: [id number]          ASSESSMENT AND PLAN:      1. Oropharyngeal dysphagia  Work up for esophageal component has thus far been unremarkable with barium esophagram and EGD. Video swallow with speech was overall benign. She expresses her symptoms of feeling in the top of her throat, which may be best evaluated by ENT. A referral for this was provided  - Ambulatory Referral to Otolaryngology; Future    2. Gastroesophageal reflux disease without esophagitis  Stable, continue daily PPI    3. Hiatal hernia  Small hernia, may be contributing to GERD. No indication for surgery at this time    ______________________________________________________________________    HPI:  75-year-old female presenting for follow up after EGD completed for dysphagia. EGD 10/13/23 was overall unremarkable aside from hiatal hernia. Biopsies were negative for EOE. Video barium swallow completed was also largely unremarkable. She expresses a feeling of a "node" or other abnormality in the back of her throat that gives her difficulty. Summary of HPI 10/12/23:  She describes difficulty swallowing pills. She feels the pills get stuck in her chest and she must wait until it passes. This can be painful. She feels like her esophagus is narrowed. She denies difficulty swallowing solid food. She also describes difficulty initiating a swallow. This occurs with liquids. She feels like they go down the wrong pipe at times. Some associated choking/coughing. She has obstructive sleep apnea but does not use CPAP. On 2 separate occasions during the night, she recalls noticing a sudden change in her breathing and suddenly she starts gasping for air. She can inhale but feels like she cannot exhale. This triggers panic. She is unsure if this is related to her hiatal hernia.  She has frequent belching and occasional regurgitation but denies heartburn. She has been on pantoprazole 40 mg daily for years. No changes in bowel habits, rectal bleeding, or abnormal weight loss. She had EGD in 2018 for GERD which showed 3 cm hiatal hernia and possible gastritis. No biopsy taken. REVIEW OF SYSTEMS:    CONSTITUTIONAL: Denies any fever, chills, rigors, and weight loss. HEENT: Denies odynophagia, tinnitus  CARDIOVASCULAR: No chest pain or palpitations. RESPIRATORY: Denies any cough, hemoptysis, shortness of breath or dyspnea on exertion. GASTROINTESTINAL: As noted in the History of Present Illness. GENITOURINARY: No problems with urination. Denies any hematuria or dysuria. NEUROLOGIC: No dizziness or vertigo, denies headaches. MUSCULOSKELETAL: Denies any muscle or joint pain. SKIN: Denies skin rashes or itching. ENDOCRINE:  Denies intolerance to heat or cold. PSYCHOSOCIAL: Denies depression or anxiety. Denies any recent memory loss. Historical Information   Past Medical History:   Diagnosis Date   • Allergic 1987    Pneumovax and Roses   • Anxiety    • Asthma    • Atypical chest pain 06/11/2020   • Burping 7/17/2018   • Cat bite of hand 06/29/2021   • Chronic fatigue 9/5/2018   • Disease of thyroid gland    • GERD (gastroesophageal reflux disease)    • Hearing loss    • Heart murmur    • Medicare annual wellness visit, subsequent 01/05/2021   • Migraine    • Peripheral vertigo    • Plantar fasciitis 2018 [facility]    Had Cortesone injection and wore a walking boot  for 6ish weeks. Right foot. • Raynaud disease    • S/P trigger finger release    • Skin tag 2017 car[facility]    PCP removed it. • Sleep apnea    • Strain of left trapezius muscle 07/19/2019   • Tinea corporis 1984    Was treated and had never returned.    • Varicella 1955    Mild case as a young child   • Visual disturbance      Past Surgical History:   Procedure Laterality Date   • CATARACT EXTRACTION     • COLON SURGERY     • EYE SURGERY  2019   • GLAUCOMA SURGERY     • HALLUX VALGUS CORRECTION Right 06/19/2007   • INCISION TENDON SHEATH HAND  05/23/2011    Release of A1 pulley   • KNEE ARTHROSCOPY W/ MENISCAL REPAIR Left 2003   • KNEE SURGERY  2005   • IL ESOPHAGOGASTRODUODENOSCOPY TRANSORAL DIAGNOSTIC N/A 08/22/2018    Procedure: ESOPHAGOGASTRODUODENOSCOPY (EGD); Surgeon: Manny Shone, MD;  Location: [facility];  Service: Gastroenterology   • IL TENDON SHEATH INCISION Left 07/11/2019    Procedure: RELEASE TRIGGER FINGER left ring;  Surgeon: Fran Langford MD;  Location: [facility];  Service: Orthopedics   • TOENAIL EXCISION  2005 [facility]    Left  great and left pinky toes.    • TONSILLECTOMY  1953   • TRIGGER FINGER RELEASE     • TUBAL LIGATION  1977   • UPPER GASTROINTESTINAL ENDOSCOPY       Social History   Social History     Substance and Sexual Activity   Alcohol Use Yes   • Alcohol/week: 3.0 standard drinks of alcohol   • Types: 3 Cans of beer per week    Comment: Per month     Social History     Substance and Sexual Activity   Drug Use Never     Social History     Tobacco Use   Smoking Status Never   Smokeless Tobacco Never     Family History   Problem Relation Age of Onset   • Lung cancer Mother    • Cancer Mother         Lung   • Stomach cancer Father    • Cancer Father         Stomach   • No Known Problems Sister    • No Known Problems Daughter    • Other Maternal Grandmother         Stroke syndrome   • No Known Problems Maternal Grandfather    • No Known Problems Paternal Grandmother    • No Known Problems Paternal Grandfather    • Cancer Brother         Respiratory Disease   • Other Maternal Aunt         Cardiomegaly   • Diabetes Maternal Aunt         Severe diabetic   • Cancer Maternal Aunt         Severe Diabetic   • Heart disease Maternal Aunt         Enlarged Heart   • No Known Problems Paternal Aunt    • No Known Problems Paternal Aunt    • No Known Problems Paternal Aunt        Meds/Allergies       Current Outpatient Medications:   •  albuterol (2.5 mg/3 mL) 0. 083 % nebulizer solution  •  albuterol (ProAir HFA) 90 mcg/act inhaler  •  amLODIPine (NORVASC) 5 mg tablet  •  aspirin 81 mg chewable tablet  •  cholecalciferol (VITAMIN D3) 1,000 units tablet  •  citalopram (CeleXA) 20 mg tablet  •  diphenoxylate-atropine (LOMOTIL) 2.5-0.025 mg per tablet  •  Fluticasone Propionate, Inhal, (Flovent Diskus) 100 MCG/BLIST AEPB  •  ibuprofen (MOTRIN) 200 mg tablet  •  Lactobacillus (Acidophilus) 100 MG CAPS  •  levothyroxine 25 mcg tablet  •  meloxicam (MOBIC) 15 mg tablet  •  Multiple Vitamin (MULTIVITAMIN) tablet  •  pantoprazole (PROTONIX) 40 mg tablet  •  pravastatin (PRAVACHOL) 10 mg tablet  •  silver sulfadiazine (SILVADENE,SSD) 1 % cream    Allergies   Allergen Reactions   • Pneumococcal Polysaccharide Vaccine Fever     Other reaction(s): Chills, fever and mottling  Category: Allergy;    • Corrie Allergic Rhinitis, Headache, Nasal Congestion and Visual Disturbance   • Timoptic [Timolol] Eye Swelling           Objective     Blood pressure 152/62, pulse 64, height 5' 1.5" (1.562 m), weight 77.6 kg (171 lb). Body mass index is 31.79 kg/m². PHYSICAL EXAM:      General Appearance:   Alert, cooperative, no distress   HEENT:   Normocephalic, atraumatic, anicteric. Neck:  Supple, symmetrical, trachea midline   Lungs:   Clear to auscultation bilaterally; no rales, rhonchi or wheezing; respirations unlabored    Heart[de-identified]   Regular rate and rhythm; no murmur, rub, or gallop. Abdomen:   Soft, non-tender, non-distended; normal bowel sounds; no masses, no organomegaly    Genitalia:   Deferred    Rectal:   Deferred    Extremities:  No cyanosis, clubbing or edema    Pulses:  2+ and symmetric    Skin:  No jaundice, rashes, or lesions          Lab Results:   No visits with results within 1 Day(s) from this visit.    Latest known visit with results is:   Hospital Outpatient Visit on 10/13/2023   Component Date Value   • Case Report 10/13/2023                      Value:Surgical Pathology Report                         Case: [id number]                                   Authorizing Provider:  Eder Pham DO    Collected:           10/13/2023 1321              Ordering Location:     [facility]     Received:            10/13/2023 [address] [place] [address] Endoscopy                                                             Pathologist:           Pattie Cummings MD                                                                Specimens:   A) - Stomach, r/o h.pylori                                                                          B) - Esophagus, EOE                                                                                 C) - Esophagus, EOE                                                                       • Final Diagnosis 10/13/2023                      Value: This result contains rich text formatting which cannot be displayed here. • Microscopic Description 10/13/2023                      Value: This result contains rich text formatting which cannot be displayed here. • Additional Information 10/13/2023                      Value: This result contains rich text formatting which cannot be displayed here. • Gross Description 10/13/2023                      Value: This result contains rich text formatting which cannot be displayed here. Radiology Results:   FL barium swallow video w speech    Result Date: 10/18/2023  Narrative: A video barium swallow study was performed by the Department of Speech Pathology. Please refer to the report for the official interpretation. The images are stored for archival purposes only. Study images were not formally reviewed by the Radiology Department. EGD    Result Date: 10/13/2023  Narrative: Table formatting from the original result was not included.  [address] [facility] [address] [facility] Floor [address] [phone] DATE OF SERVICE: 10/13/23 PHYSICIAN(S): Attending: Corby Mitchell DO Fellow: No Staff Documented INDICATION: Early satiety, Belching, Gastroesophageal reflux disease without esophagitis, Dysphagia, unspecified type POST-OP DIAGNOSIS: See the impression below. PREPROCEDURE: Informed consent was obtained for the procedure, including sedation. Risks of perforation, hemorrhage, adverse drug reaction and aspiration were discussed. The patient was placed in the left lateral decubitus position. Patient was explained about the risks and benefits of the procedure. Risks including but not limited to bleeding, infection, and perforation were explained in detail. Also explained about less than 100% sensitivity with the exam and other alternatives. PROCEDURE: EGD DETAILS OF PROCEDURE: Patient was taken to the procedure room where a time out was performed to confirm correct patient and correct procedure. The patient underwent monitored anesthesia care, which was administered by an anesthesia professional. The patient's blood pressure, heart rate, level of consciousness, respirations and oxygen were monitored throughout the procedure. The scope was advanced to the second part of the duodenum. Retroflexion was performed in the fundus. The patient experienced no blood loss. The procedure was not difficult. The patient tolerated the procedure well. There were no apparent adverse events. ANESTHESIA INFORMATION: ASA: III Anesthesia Type: IV Sedation with Anesthesia MEDICATIONS: No administrations occurring from 1310 to 1330 on 10/13/23 FINDINGS: The esophagus appeared normal. Z-line is 37 cm from the incisors. Performed random biopsy using biopsy forceps to rule out eosinophilic esophagitis.  2 cm hiatal hernia - GE junction 37 cm from the incisors, diaphragmatic impression 39 cm from the incisors Edematous mucosa with erosion in the antrum; performed cold forceps biopsy to rule out H. pylori The duodenum appeared normal. SPECIMENS: ID Type Source Tests Collected by Time Destination 1 : r/o h.pylori Tissue Stomach TISSUE EXAM Patrizia Pagan  10/13/2023  1:21 PM  2 : EOE Tissue Esophagus TISSUE EXAM Patrizia Pagan,  10/13/2023  1:22 PM  3 : EOE Tissue Esophagus TISSUE EXAM Patrizia Pagan, DO 10/13/2023  1:23 PM      Impression: The esophagus appeared normal. Performed random biopsy to rule out eosinophilic esophagitis. 2 cm hiatal hernia Edematous mucosa with erosion in the antrum; performed cold forceps biopsy The duodenum appeared normal. RECOMMENDATION:  Await pathology results Proceed with barium swallow study as planned Follow up in GI office   Patrizia PaganDO     XR chest pa & lateral    Result Date: 10/8/2023  Narrative: CHEST INDICATION:   R06.02: Shortness of breath R07.9: Chest pain, unspecified G89.29: Other chronic pain. Left chest pain. COMPARISON: CXR 10/23/2020 and chest CT 3/21/2014. EXAM PERFORMED/VIEWS:  XR CHEST PA & LATERAL. DUAL ENERGY SUBTRACTION. FINDINGS: Cardiomediastinal silhouette normal. Lungs clear. No effusion or pneumothorax. Upper abdomen normal.  Bones normal for age. Impression: No acute cardiopulmonary disease.  Workstation performed: [id number]

## 2024-01-11 ENCOUNTER — TELEPHONE (OUTPATIENT)
Dept: FAMILY MEDICINE CLINIC | Facility: HOSPITAL | Age: 74
End: 2024-01-11

## 2024-01-11 NOTE — TELEPHONE ENCOUNTER
Patients  went to ER on Tuesday for UTI, subsequently diagnosed with COVID and started with symptoms that day. Provider at ED told wife to assume she is COVID + as well and she should get Paxlovid. Wife does not have any symptoms nor has tested for COVID. Advised we cannot prescribe Paxlovid without a positive COVID test. Wife states she does not like the rapids and would like PCR. Explained that we mostly do rapids in office anymore for COVID diagnosis and I'm not sure whether a PCR is warranted at this point based on exposure. Advised to monitor for symptoms and to take home COVID test, but wanted me to run by you as well.  Please advise

## 2024-01-11 NOTE — TELEPHONE ENCOUNTER
Pt states her  is positive for covid.  She was told at the hospital that she should assume she also has covid but has not tested.   She has no symptoms but would like to get Paxlovid.  Pt can be reached at [phone]

## 2024-01-30 ENCOUNTER — OFFICE VISIT (OUTPATIENT)
Dept: FAMILY MEDICINE CLINIC | Facility: HOSPITAL | Age: 74
End: 2024-01-30
Payer: COMMERCIAL

## 2024-01-30 ENCOUNTER — HOSPITAL ENCOUNTER (OUTPATIENT)
Dept: RADIOLOGY | Facility: HOSPITAL | Age: 74
Discharge: HOME | End: 2024-01-30
Payer: COMMERCIAL

## 2024-01-30 VITALS
HEART RATE: 71 BPM | DIASTOLIC BLOOD PRESSURE: 82 MMHG | TEMPERATURE: 98.4 F | WEIGHT: 172.8 LBS | SYSTOLIC BLOOD PRESSURE: 150 MMHG | BODY MASS INDEX: 32.62 KG/M2 | OXYGEN SATURATION: 96 % | HEIGHT: 61 IN

## 2024-01-30 DIAGNOSIS — M79.645 FINGER PAIN, LEFT: Primary | ICD-10-CM

## 2024-01-30 DIAGNOSIS — M79.645 FINGER PAIN, LEFT: ICD-10-CM

## 2024-01-30 DIAGNOSIS — I10 PRIMARY HYPERTENSION: ICD-10-CM

## 2024-01-30 PROCEDURE — 1159F MED LIST DOCD IN RCRD: CPT | Performed by: INTERNAL MEDICINE

## 2024-01-30 PROCEDURE — 3077F SYST BP >= 140 MM HG: CPT | Performed by: INTERNAL MEDICINE

## 2024-01-30 PROCEDURE — 1160F RVW MEDS BY RX/DR IN RCRD: CPT | Performed by: INTERNAL MEDICINE

## 2024-01-30 PROCEDURE — 73130 X-RAY EXAM OF HAND: CPT

## 2024-01-30 PROCEDURE — 3079F DIAST BP 80-89 MM HG: CPT | Performed by: INTERNAL MEDICINE

## 2024-01-30 PROCEDURE — 99214 OFFICE O/P EST MOD 30 MIN: CPT | Performed by: INTERNAL MEDICINE

## 2024-01-30 NOTE — PROGRESS NOTES
Name: Fiorella Ashraf      : 1950      MRN: [id number]  Encounter Provider: Shasha Beck DO  Encounter Date: 2024   Encounter department: [facility] [address]     Assessment & Plan     1. Finger pain, left  Comments:  Poss fx of L 5th digit - injury occurred over a month ago and unlikely any intervention needed, will check Xray to eval alignment and ensure no bone chip in join, reassured arthritis can occur in joint whether this was splinted/treated or with just cassy taping it as she did, can use Tylenol/heat/ice and call with new/worse pain  Orders:  -     XR hand 3+ vw left; Future; Expected date: 2024    2. Primary hypertension  Assessment & Plan:  BP elevated above goal and not much better by end of appt, con't current Amlodipine for now, f/u with PCP for BP check next month             Subjective      HPI Pt here for an acute visit    Pt was grabbing dogs collar and it twisted her hand and she had some swelling/pain at L pinkie finger.  Injury occurred about a month ago.  She thought it was broken and cassy tapped it and watched it.  Since injury occurred the pinkie is still a bit swollen and slightly red. She has some mild pain at rest and with movement.  She never went for eval or had xrays.      BP a bit elevated on presentation pt admits her BP is usually high on presentation with MA so she requested I recheck it.  She notes no frequent HA's/dizziness/double vision.  She does not check her BP at home.  She is taking her Amlodipine daily as directed w/o SE.      Review of Systems   Constitutional:  Negative for chills and fever.   Eyes:  Negative for pain and visual disturbance.   Respiratory:  Negative for cough and shortness of breath.    Cardiovascular:  Negative for chest pain and palpitations.   Musculoskeletal:  Positive for arthralgias and joint swelling.   Skin:  Positive for color change. Negative for rash and wound.   Neurological:  Negative for  dizziness, light-headedness and headaches.   Psychiatric/Behavioral:  Negative for confusion.        Current Outpatient Medications on File Prior to Visit   Medication Sig    albuterol (2.5 mg/3 mL) 0.083 % nebulizer solution Take 3 mL (2.5 mg total) by nebulization every 6 (six) hours as needed for wheezing or shortness of breath    albuterol (ProAir HFA) 90 mcg/act inhaler Inhale 2 puffs every 6 (six) hours as needed for wheezing    amLODIPine (NORVASC) 5 mg tablet Take 1 tablet (5 mg total) by mouth daily    aspirin 81 mg chewable tablet Chew 1 tablet daily    cholecalciferol (VITAMIN D3) 1,000 units tablet Take 1,000 Units by mouth daily    citalopram (CeleXA) 20 mg tablet Take 1 tablet (20 mg total) by mouth daily    diphenoxylate-atropine (LOMOTIL) 2.5-0.025 mg per tablet Take 1 tablet by mouth daily as needed (as needed)    famotidine (PEPCID) 20 mg tablet Take 1 tablet (20 mg total) by mouth 2 (two) times a day    Fluticasone Propionate, Inhal, (Flovent Diskus) 100 MCG/BLIST AEPB Inhale 1 puff 2 (two) times a day Rinse mouth after use. (Patient taking differently: Inhale 1 puff 2 (two) times a day Rinse mouth after use. Uses PRN)    ibuprofen (MOTRIN) 200 mg tablet Take 200 mg by mouth every 6 (six) hours as needed for mild pain    Lactobacillus (Acidophilus) 100 MG CAPS Take by mouth    levothyroxine 25 mcg tablet Take 1 tablet (25 mcg total) by mouth daily    meloxicam (MOBIC) 15 mg tablet Take 1 tablet (15 mg total) by mouth daily    Multiple Vitamin (MULTIVITAMIN) tablet Take 1 tablet by mouth daily    pantoprazole (PROTONIX) 40 mg tablet Take 1 tablet (40 mg total) by mouth daily    pravastatin (PRAVACHOL) 10 mg tablet Take 1 tablet (10 mg total) by mouth daily at bedtime    silver sulfadiazine (SILVADENE,SSD) 1 % cream Apply topically daily (Patient taking differently: Apply topically daily PRN)    [DISCONTINUED] famotidine (PEPCID) 40 MG tablet Take 1 tablet (40 mg total) by mouth daily at bedtime  "      Objective     /82   Pulse 71   Temp 98.4 °F (36.9 °C)   Ht 5' 1\" (1.549 m)   Wt 78.4 kg (172 lb 12.8 oz)   SpO2 96%   BMI 32.65 kg/m²     Physical Exam  Vitals and nursing note reviewed.   Constitutional:       General: She is not in acute distress.     Appearance: She is well-developed. She is not ill-appearing.   HENT:      Head: Normocephalic and atraumatic.   Eyes:      General:         Right eye: No discharge.         Left eye: No discharge.      Conjunctiva/sclera: Conjunctivae normal.   Neck:      Trachea: No tracheal deviation.   Cardiovascular:      Rate and Rhythm: Normal rate and regular rhythm.      Heart sounds: Normal heart sounds. No murmur heard.     No friction rub.   Pulmonary:      Effort: Pulmonary effort is normal. No respiratory distress.      Breath sounds: Normal breath sounds. No wheezing, rhonchi or rales.   Musculoskeletal:         General: Swelling and tenderness present. No deformity or signs of injury.      Cervical back: Neck supple.      Comments: L hand: no pain with palp of metacarpal of 5th digit, some tenderness and swelling at MCP and PIP, able to bend digits w/o significant difficulty/pain   Skin:     General: Skin is warm.      Coloration: Skin is not pale.      Findings: No bruising, erythema or rash.   Neurological:      General: No focal deficit present.      Mental Status: She is alert.      Sensory: No sensory deficit.      Motor: No weakness or abnormal muscle tone.   Psychiatric:         Behavior: Behavior normal.         Thought Content: Thought content normal.         Judgment: Judgment normal.       Shasha Beck DO    "

## 2024-01-30 NOTE — ASSESSMENT & PLAN NOTE
BP elevated above goal and not much better by end of appt, con't current Amlodipine for now, f/u with PCP for BP check next month

## 2024-02-14 ENCOUNTER — TELEPHONE (OUTPATIENT)
Dept: FAMILY MEDICINE CLINIC | Facility: HOSPITAL | Age: 74
End: 2024-02-14

## 2024-02-14 DIAGNOSIS — E78.5 HYPERLIPIDEMIA, UNSPECIFIED HYPERLIPIDEMIA TYPE: Primary | ICD-10-CM

## 2024-02-14 DIAGNOSIS — R73.01 IMPAIRED FASTING GLUCOSE: ICD-10-CM

## 2024-02-14 NOTE — TELEPHONE ENCOUNTER
Patient saw Dr Hillman in August.    She thought he had ordered labs for her @ that time.     She went to [facility] but they do not have any orders for her.     The only thing I see ordered from then is for cologuard.    Does patient need labs done before her next apt on 2/21?    Please call to advise

## 2024-02-16 LAB
ALBUMIN SERPL-MCNC: 4.5 G/DL (ref 3.8–4.8)
ALBUMIN/GLOB SERPL: 2 {RATIO} (ref 1.2–2.2)
ALP SERPL-CCNC: 45 IU/L (ref 44–121)
ALT SERPL-CCNC: 16 IU/L (ref 0–32)
AST SERPL-CCNC: 21 IU/L (ref 0–40)
BILIRUB SERPL-MCNC: 0.5 MG/DL (ref 0–1.2)
BUN SERPL-MCNC: 15 MG/DL (ref 8–27)
BUN/CREAT SERPL: 18 (ref 12–28)
CALCIUM SERPL-MCNC: 9.7 MG/DL (ref 8.7–10.3)
CHLORIDE SERPL-SCNC: 103 MMOL/L (ref 96–106)
CHOLEST SERPL-MCNC: 180 MG/DL (ref 100–199)
CO2 SERPL-SCNC: 24 MMOL/L (ref 20–29)
CREAT SERPL-MCNC: 0.83 MG/DL (ref 0.57–1)
EGFR: 73 ML/MIN/1.73
ERYTHROCYTE [DISTWIDTH] IN BLOOD BY AUTOMATED COUNT: 12.7 % (ref 11.7–15.4)
EST. AVERAGE GLUCOSE BLD GHB EST-MCNC: 131 MG/DL
GLOBULIN SER-MCNC: 2.3 G/DL (ref 1.5–4.5)
GLUCOSE SERPL-MCNC: 103 MG/DL (ref 70–99)
HBA1C MFR BLD: 6.2 % (ref 4.8–5.6)
HCT VFR BLD AUTO: 40 % (ref 34–46.6)
HDLC SERPL-MCNC: 68 MG/DL
HGB BLD-MCNC: 12.8 G/DL (ref 11.1–15.9)
LDLC SERPL CALC-MCNC: 98 MG/DL (ref 0–99)
LDLC/HDLC SERPL: 1.4 RATIO (ref 0–3.2)
MCH RBC QN AUTO: 30.7 PG (ref 26.6–33)
MCHC RBC AUTO-ENTMCNC: 32 G/DL (ref 31.5–35.7)
MCV RBC AUTO: 96 FL (ref 79–97)
PLATELET # BLD AUTO: 251 X10E3/UL (ref 150–450)
POTASSIUM SERPL-SCNC: 4.8 MMOL/L (ref 3.5–5.2)
PROT SERPL-MCNC: 6.8 G/DL (ref 6–8.5)
RBC # BLD AUTO: 4.17 X10E6/UL (ref 3.77–5.28)
SL AMB VLDL CHOLESTEROL CALC: 14 MG/DL (ref 5–40)
SODIUM SERPL-SCNC: 141 MMOL/L (ref 134–144)
TRIGL SERPL-MCNC: 73 MG/DL (ref 0–149)
TSH SERPL DL<=0.005 MIU/L-ACNC: 2.7 UIU/ML (ref 0.45–4.5)
WBC # BLD AUTO: 6.9 X10E3/UL (ref 3.4–10.8)

## 2024-02-19 ENCOUNTER — VBI (OUTPATIENT)
Dept: ADMINISTRATIVE | Facility: OTHER | Age: 74
End: 2024-02-19

## 2024-02-19 NOTE — TELEPHONE ENCOUNTER
02/19/24 11:02 AM    Patient contacted (left message) to bring Advance Directive, POLST, or Living Will document to next scheduled pcp visit.    Thank you.  Kristie Montoya [facility]

## 2024-02-21 ENCOUNTER — OFFICE VISIT (OUTPATIENT)
Dept: FAMILY MEDICINE CLINIC | Facility: HOSPITAL | Age: 74
End: 2024-02-21
Payer: COMMERCIAL

## 2024-02-21 VITALS
SYSTOLIC BLOOD PRESSURE: 138 MMHG | HEART RATE: 64 BPM | BODY MASS INDEX: 32.66 KG/M2 | HEIGHT: 61 IN | OXYGEN SATURATION: 94 % | WEIGHT: 173 LBS | TEMPERATURE: 97.7 F | DIASTOLIC BLOOD PRESSURE: 58 MMHG

## 2024-02-21 DIAGNOSIS — F41.9 ANXIETY: ICD-10-CM

## 2024-02-21 DIAGNOSIS — R73.01 IMPAIRED FASTING GLUCOSE: Primary | ICD-10-CM

## 2024-02-21 DIAGNOSIS — M94.0 COSTOCHONDRITIS: ICD-10-CM

## 2024-02-21 DIAGNOSIS — E03.9 ACQUIRED HYPOTHYROIDISM: ICD-10-CM

## 2024-02-21 DIAGNOSIS — E78.5 HYPERLIPIDEMIA, UNSPECIFIED HYPERLIPIDEMIA TYPE: ICD-10-CM

## 2024-02-21 DIAGNOSIS — R19.7 DIARRHEA, UNSPECIFIED TYPE: ICD-10-CM

## 2024-02-21 DIAGNOSIS — I10 ESSENTIAL HYPERTENSION: ICD-10-CM

## 2024-02-21 PROCEDURE — 99214 OFFICE O/P EST MOD 30 MIN: CPT | Performed by: FAMILY MEDICINE

## 2024-02-21 RX ORDER — CITALOPRAM 20 MG/1
20 TABLET ORAL DAILY
Qty: 90 TABLET | Refills: 2 | Status: SHIPPED | OUTPATIENT
Start: 2024-02-21

## 2024-02-21 RX ORDER — PRAVASTATIN SODIUM 10 MG
10 TABLET ORAL
Qty: 90 TABLET | Refills: 3 | Status: SHIPPED | OUTPATIENT
Start: 2024-02-21

## 2024-02-21 RX ORDER — LEVOTHYROXINE SODIUM 0.03 MG/1
25 TABLET ORAL DAILY
Qty: 90 TABLET | Refills: 3 | Status: SHIPPED | OUTPATIENT
Start: 2024-02-21

## 2024-02-21 RX ORDER — MELOXICAM 15 MG/1
15 TABLET ORAL DAILY PRN
Qty: 90 TABLET | Refills: 0 | Status: SHIPPED | OUTPATIENT
Start: 2024-02-21

## 2024-02-21 RX ORDER — AMLODIPINE BESYLATE 5 MG/1
5 TABLET ORAL DAILY
Qty: 90 TABLET | Refills: 3 | Status: SHIPPED | OUTPATIENT
Start: 2024-02-21

## 2024-02-21 RX ORDER — DIPHENOXYLATE HYDROCHLORIDE AND ATROPINE SULFATE 2.5; .025 MG/1; MG/1
1 TABLET ORAL DAILY PRN
Qty: 30 TABLET | Refills: 0 | Status: SHIPPED | OUTPATIENT
Start: 2024-02-21

## 2024-02-21 NOTE — PROGRESS NOTES
Name: Fiorella Ashraf      : 1950      MRN: [id number]  Encounter Provider: Thony Connelly MD  Encounter Date: 2024   Encounter department: [facility] PRIMARY CARE [address]     Assessment & Plan     1. Impaired fasting glucose  Reviewed labs.   Continue to work on dietary control.   2. Anxiety  -     citalopram (CeleXA) 20 mg tablet; Take 1 tablet (20 mg total) by mouth daily  Doing okay on celexa at the lower dose of 20 mg daily. Continue with same for now.   Multiple stressors due to husbands recent illness/hospitalization.   3. Acquired hypothyroidism  -     levothyroxine 25 mcg tablet; Take 1 tablet (25 mcg total) by mouth daily  Stable. Continue with the same.   4. Hyperlipidemia, unspecified hyperlipidemia type  -     pravastatin (PRAVACHOL) 10 mg tablet; Take 1 tablet (10 mg total) by mouth daily at bedtime  Doing well.   Cotninue with pravastatin.   5. Essential hypertension  -     amLODIPine (NORVASC) 5 mg tablet; Take 1 tablet (5 mg total) by mouth daily  Controlled. Continue with  same.   6. Costochondritis  -     meloxicam (MOBIC) 15 mg tablet; Take 1 tablet (15 mg total) by mouth daily as needed for mild pain or moderate pain  Prn mobic. Using this about twice a month.   7. Diarrhea, unspecified type  -     diphenoxylate-atropine (LOMOTIL) 2.5-0.025 mg per tablet; Take 1 tablet by mouth daily as needed (as needed)  Diarrhea, on occasionan. Refilled lomotil for the times she needs this.          Subjective      Fiorella is here for fu of chornic conditions. Reports overall doing wel.   Had some stressors due to husbands medical conditions.   Doing well with medications and needing refills.         Review of Systems   Constitutional: Negative.  Negative for activity change, appetite change, chills, diaphoresis, fatigue and fever.   HENT:  Negative for congestion, facial swelling and sore throat.    Respiratory: Negative.  Negative for apnea, cough, chest tightness and  shortness of breath.    Cardiovascular: Negative.  Negative for chest pain and palpitations.   Gastrointestinal: Negative.  Negative for abdominal distention, abdominal pain, blood in stool, constipation, diarrhea and nausea.   Genitourinary: Negative.  Negative for difficulty urinating, dysuria, flank pain and frequency.       Current Outpatient Medications on File Prior to Visit   Medication Sig   • aspirin 81 mg chewable tablet Chew 1 tablet daily   • cholecalciferol (VITAMIN D3) 1,000 units tablet Take 1,000 Units by mouth daily   • famotidine (PEPCID) 20 mg tablet Take 1 tablet (20 mg total) by mouth 2 (two) times a day   • ibuprofen (MOTRIN) 200 mg tablet Take 200 mg by mouth every 6 (six) hours as needed for mild pain   • Lactobacillus (Acidophilus) 100 MG CAPS Take by mouth   • Multiple Vitamin (MULTIVITAMIN) tablet Take 1 tablet by mouth daily   • pantoprazole (PROTONIX) 40 mg tablet Take 1 tablet (40 mg total) by mouth daily   • [DISCONTINUED] amLODIPine (NORVASC) 5 mg tablet Take 1 tablet (5 mg total) by mouth daily   • [DISCONTINUED] citalopram (CeleXA) 20 mg tablet Take 1 tablet (20 mg total) by mouth daily   • [DISCONTINUED] diphenoxylate-atropine (LOMOTIL) 2.5-0.025 mg per tablet Take 1 tablet by mouth daily as needed (as needed)   • [DISCONTINUED] levothyroxine 25 mcg tablet Take 1 tablet (25 mcg total) by mouth daily   • [DISCONTINUED] meloxicam (MOBIC) 15 mg tablet Take 1 tablet (15 mg total) by mouth daily   • [DISCONTINUED] pravastatin (PRAVACHOL) 10 mg tablet Take 1 tablet (10 mg total) by mouth daily at bedtime   • albuterol (2.5 mg/3 mL) 0.083 % nebulizer solution Take 3 mL (2.5 mg total) by nebulization every 6 (six) hours as needed for wheezing or shortness of breath (Patient not taking: Reported on 2/21/2024)   • albuterol (ProAir HFA) 90 mcg/act inhaler Inhale 2 puffs every 6 (six) hours as needed for wheezing (Patient not taking: Reported on 2/21/2024)   • Fluticasone Propionate, Inhal,  "(Flovent Diskus) 100 MCG/BLIST AEPB Inhale 1 puff 2 (two) times a day Rinse mouth after use. (Patient taking differently: Inhale 1 puff 2 (two) times a day Rinse mouth after use. Uses PRN)   • silver sulfadiazine (SILVADENE,SSD) 1 % cream Apply topically daily (Patient not taking: Reported on 2/21/2024)       Objective     /58   Pulse 64   Temp 97.7 °F (36.5 °C)   Ht 5' 1\" (1.549 m)   Wt 78.5 kg (173 lb)   SpO2 94%   BMI 32.69 kg/m²     Physical Exam  Vitals and nursing note reviewed.   Constitutional:       Appearance: Normal appearance. She is well-developed.   HENT:      Head: Normocephalic and atraumatic.      Right Ear: External ear normal.      Left Ear: External ear normal.      Nose: Nose normal.   Eyes:      Conjunctiva/sclera: Conjunctivae normal.      Pupils: Pupils are equal, round, and reactive to light.   Neck:      Thyroid: No thyromegaly.      Trachea: No tracheal deviation.   Cardiovascular:      Rate and Rhythm: Normal rate and regular rhythm.      Heart sounds: Normal heart sounds. No murmur heard.  Pulmonary:      Effort: Pulmonary effort is normal. No respiratory distress.      Breath sounds: Normal breath sounds. No wheezing.   Abdominal:      General: Bowel sounds are normal.      Palpations: Abdomen is soft.   Musculoskeletal:         General: Normal range of motion.      Cervical back: Normal range of motion and neck supple.   Skin:     General: Skin is warm and dry.      Capillary Refill: Capillary refill takes less than 2 seconds.   Neurological:      General: No focal deficit present.      Mental Status: She is alert and oriented to person, place, and time.   Psychiatric:         Mood and Affect: Mood normal.         Behavior: Behavior normal.       Thony Connelly MD    "

## 2024-04-24 DIAGNOSIS — K21.9 GASTROESOPHAGEAL REFLUX DISEASE WITHOUT ESOPHAGITIS: ICD-10-CM

## 2024-04-24 DIAGNOSIS — M94.0 COSTOCHONDRITIS: ICD-10-CM

## 2024-04-25 RX ORDER — MELOXICAM 15 MG/1
TABLET ORAL
Qty: 90 TABLET | Refills: 1 | Status: SHIPPED | OUTPATIENT
Start: 2024-04-25

## 2024-04-25 RX ORDER — PANTOPRAZOLE SODIUM 40 MG/1
40 TABLET, DELAYED RELEASE ORAL DAILY
Qty: 90 TABLET | Refills: 1 | Status: SHIPPED | OUTPATIENT
Start: 2024-04-25

## 2024-05-10 ENCOUNTER — VBI (OUTPATIENT)
Dept: ADMINISTRATIVE | Facility: OTHER | Age: 74
End: 2024-05-10

## 2024-06-19 ENCOUNTER — HOSPITAL ENCOUNTER (OUTPATIENT)
Dept: MAMMOGRAPHY | Facility: CLINIC | Age: 74
Discharge: HOME | End: 2024-06-19
Payer: COMMERCIAL

## 2024-06-19 VITALS — WEIGHT: 173 LBS | BODY MASS INDEX: 32.66 KG/M2 | HEIGHT: 61 IN

## 2024-06-19 DIAGNOSIS — Z12.31 ENCOUNTER FOR SCREENING MAMMOGRAM FOR MALIGNANT NEOPLASM OF BREAST: ICD-10-CM

## 2024-06-19 PROCEDURE — 77067 SCR MAMMO BI INCL CAD: CPT

## 2024-06-19 PROCEDURE — 77063 BREAST TOMOSYNTHESIS BI: CPT

## 2024-07-10 ENCOUNTER — OFFICE VISIT (OUTPATIENT)
Dept: FAMILY MEDICINE CLINIC | Facility: CLINIC | Age: 74
End: 2024-07-10
Payer: COMMERCIAL

## 2024-07-10 VITALS
HEIGHT: 61 IN | SYSTOLIC BLOOD PRESSURE: 135 MMHG | WEIGHT: 166 LBS | BODY MASS INDEX: 31.34 KG/M2 | HEART RATE: 76 BPM | TEMPERATURE: 97.2 F | DIASTOLIC BLOOD PRESSURE: 80 MMHG | OXYGEN SATURATION: 93 %

## 2024-07-10 DIAGNOSIS — R73.01 ELEVATED FASTING GLUCOSE: ICD-10-CM

## 2024-07-10 DIAGNOSIS — E03.9 ACQUIRED HYPOTHYROIDISM: ICD-10-CM

## 2024-07-10 DIAGNOSIS — E78.49 OTHER HYPERLIPIDEMIA: ICD-10-CM

## 2024-07-10 DIAGNOSIS — G89.29 CHRONIC PAIN OF RIGHT KNEE: ICD-10-CM

## 2024-07-10 DIAGNOSIS — M25.561 CHRONIC PAIN OF RIGHT KNEE: ICD-10-CM

## 2024-07-10 DIAGNOSIS — I10 PRIMARY HYPERTENSION: Primary | ICD-10-CM

## 2024-07-10 PROBLEM — K21.9 LARYNGOPHARYNGEAL REFLUX (LPR): Status: ACTIVE | Noted: 2023-10-01

## 2024-07-10 PROBLEM — IMO0001 ASYMMETRICAL HEARING LOSS OF BOTH EARS: Status: RESOLVED | Noted: 2020-06-25 | Resolved: 2024-07-10

## 2024-07-10 PROBLEM — E66.811 OBESITY (BMI 30.0-34.9): Status: RESOLVED | Noted: 2019-07-20 | Resolved: 2024-07-10

## 2024-07-10 PROBLEM — M67.431 GANGLION CYST OF VOLAR ASPECT OF RIGHT WRIST: Status: ACTIVE | Noted: 2023-03-01

## 2024-07-10 PROBLEM — H91.91 HEARING LOSS IN RIGHT EAR: Status: ACTIVE | Noted: 2022-08-26

## 2024-07-10 PROBLEM — E66.9 OBESITY (BMI 30.0-34.9): Status: RESOLVED | Noted: 2019-07-20 | Resolved: 2024-07-10

## 2024-07-10 PROBLEM — S23.41XA RIB SPRAIN, INITIAL ENCOUNTER: Status: RESOLVED | Noted: 2018-11-13 | Resolved: 2024-07-10

## 2024-07-10 PROBLEM — M62.838 MUSCLE SPASM: Status: RESOLVED | Noted: 2020-06-11 | Resolved: 2024-07-10

## 2024-07-10 PROBLEM — M72.2 PLANTAR FASCIITIS OF LEFT FOOT: Status: ACTIVE | Noted: 2023-06-02

## 2024-07-10 PROCEDURE — G2211 COMPLEX E/M VISIT ADD ON: HCPCS | Performed by: FAMILY MEDICINE

## 2024-07-10 PROCEDURE — 99214 OFFICE O/P EST MOD 30 MIN: CPT | Performed by: FAMILY MEDICINE

## 2024-07-10 NOTE — PROGRESS NOTES
Assessment/Plan:      1. Primary hypertension  Assessment & Plan:  Controlled, continue current medication  Orders:  -     CBC and differential; Future  -     Comprehensive metabolic panel; Future  -     Lipid Panel with Direct LDL reflex  -     CBC and differential  -     Comprehensive metabolic panel  2. Chronic pain of right knee  Assessment & Plan:  Ok for biofreeze and ibuprofen as needed, likely djd - history of medial meniscectomy. Consider ortho evaluation.  3. Other hyperlipidemia  -     Lipid Panel with Direct LDL reflex  4. Acquired hypothyroidism  -     TSH, 3rd generation with Free T4 reflex; Future  -     TSH, 3rd generation with Free T4 reflex  5. Elevated fasting glucose  -     Hemoglobin A1c (w/out EAG); Future  -     Comprehensive metabolic panel; Future  -     Hemoglobin A1c (w/out EAG)  -     Comprehensive metabolic panel  6. BMI 31.0-31.9,adult        Subjective:  Chief Complaint   Patient presents with    [facility] [place] [facility]     No concerns, touch base    /70        Patient ID: Fiorella Ashraf is a 73 y.o. female.    Pt is here with her  today to re-establish as a patient in our office.  Pt had Egd,and  laryngoscopy for oropharyngeal dysphagia  Complains of right knee pain- using Biofreeze, ibuprofen  Some swelling.  Has a history of medial meniscectomy          Review of Systems   Constitutional: Negative.  Negative for fatigue and fever.   HENT:  Positive for trouble swallowing.    Eyes: Negative.    Respiratory: Negative.  Negative for cough.    Cardiovascular: Negative.    Gastrointestinal: Negative.    Endocrine: Negative.    Genitourinary: Negative.    Musculoskeletal:  Positive for arthralgias.        Right knee pain   Skin: Negative.    Allergic/Immunologic: Negative.    Neurological: Negative.    Psychiatric/Behavioral: Negative.           The following portions of the patient's history were reviewed and updated as appropriate: allergies, current medications, past family  "history, past medical history, past social history, past surgical history and problem list.    Objective:  Vitals:    07/10/24 1326 07/10/24 1357   BP:  135/80   Pulse: 76    Temp: (!) 97.2 °F (36.2 °C)    TempSrc: Tympanic    SpO2: 93%    Weight: 75.3 kg (166 lb)    Height: 5' 1\" (1.549 m)       Physical Exam  Vitals and nursing note reviewed.   Constitutional:       Appearance: She is well-developed. She is obese.   HENT:      Head: Normocephalic and atraumatic.   Cardiovascular:      Rate and Rhythm: Normal rate and regular rhythm.      Heart sounds: Normal heart sounds.   Pulmonary:      Effort: Pulmonary effort is normal.      Breath sounds: Normal breath sounds.   Abdominal:      General: Bowel sounds are normal.      Palpations: Abdomen is soft.   Musculoskeletal:         General: Swelling and tenderness present. No signs of injury.      Comments: Tenderness along joint line, medial greater than lateral.   Tenderness over distal attachment of MCL   Skin:     General: Skin is warm and dry.   Neurological:      Mental Status: She is alert and oriented to person, place, and time.   Psychiatric:         Behavior: Behavior normal.         Thought Content: Thought content normal.         Judgment: Judgment normal.         "

## 2024-07-11 NOTE — ASSESSMENT & PLAN NOTE
Ok for biofreeze and ibuprofen as needed, likely djd - history of medial meniscectomy. Consider ortho evaluation.

## 2024-08-09 DIAGNOSIS — K21.9 LARYNGOPHARYNGEAL REFLUX (LPR): ICD-10-CM

## 2024-08-09 DIAGNOSIS — J03.90 LINGUAL TONSILLITIS: ICD-10-CM

## 2024-08-09 DIAGNOSIS — R19.7 DIARRHEA, UNSPECIFIED TYPE: ICD-10-CM

## 2024-08-11 RX ORDER — FAMOTIDINE 20 MG/1
20 TABLET, FILM COATED ORAL 2 TIMES DAILY
Qty: 180 TABLET | Refills: 1 | Status: SHIPPED | OUTPATIENT
Start: 2024-08-11 | End: 2025-02-07

## 2024-08-12 RX ORDER — DIPHENOXYLATE HCL/ATROPINE 2.5-.025MG
1 TABLET ORAL DAILY PRN
Qty: 30 TABLET | Refills: 0 | Status: SHIPPED | OUTPATIENT
Start: 2024-08-12

## 2024-09-12 ENCOUNTER — APPOINTMENT (OUTPATIENT)
Dept: RADIOLOGY | Facility: CLINIC | Age: 74
End: 2024-09-12
Payer: COMMERCIAL

## 2024-09-12 ENCOUNTER — OFFICE VISIT (OUTPATIENT)
Dept: OBGYN CLINIC | Facility: CLINIC | Age: 74
End: 2024-09-12
Payer: COMMERCIAL

## 2024-09-12 VITALS — WEIGHT: 164 LBS | HEIGHT: 61 IN | BODY MASS INDEX: 30.96 KG/M2

## 2024-09-12 DIAGNOSIS — M25.561 RIGHT KNEE PAIN, UNSPECIFIED CHRONICITY: Primary | ICD-10-CM

## 2024-09-12 DIAGNOSIS — M25.561 RIGHT KNEE PAIN, UNSPECIFIED CHRONICITY: ICD-10-CM

## 2024-09-12 PROCEDURE — 20610 DRAIN/INJ JOINT/BURSA W/O US: CPT

## 2024-09-12 PROCEDURE — 99213 OFFICE O/P EST LOW 20 MIN: CPT | Performed by: STUDENT IN AN ORGANIZED HEALTH CARE EDUCATION/TRAINING PROGRAM

## 2024-09-12 PROCEDURE — 73564 X-RAY EXAM KNEE 4 OR MORE: CPT

## 2024-09-12 RX ORDER — TRIAMCINOLONE ACETONIDE 40 MG/ML
40 INJECTION, SUSPENSION INTRA-ARTICULAR; INTRAMUSCULAR
Status: COMPLETED | OUTPATIENT
Start: 2024-09-12 | End: 2024-09-12

## 2024-09-12 RX ORDER — BUPIVACAINE HYDROCHLORIDE 2.5 MG/ML
4 INJECTION, SOLUTION INFILTRATION; PERINEURAL
Status: COMPLETED | OUTPATIENT
Start: 2024-09-12 | End: 2024-09-12

## 2024-09-12 RX ADMIN — TRIAMCINOLONE ACETONIDE 40 MG: 40 INJECTION, SUSPENSION INTRA-ARTICULAR; INTRAMUSCULAR at 11:30

## 2024-09-12 RX ADMIN — BUPIVACAINE HYDROCHLORIDE 4 ML: 2.5 INJECTION, SOLUTION INFILTRATION; PERINEURAL at 11:30

## 2024-09-12 NOTE — PROGRESS NOTES
Ortho Sports Medicine Knee New Patient Visit     Assesment:   74 y.o. female right knee pain likely due possible meniscus tear    Plan:    Ariel is present in office for evaluation of her right knee. Patient at this time had x-rays in office which were reviewed with her and her . Discussed that imaging shows mild osteoarthritis. At this time discussed that pain likely due to degenerative medial meniscus tear. At this time recommended conservative treatment including a right knee corticosteroid injection which was performed without complication and was well tolerated. Patient is to follow up as needed and should continue exercises and use ice, ibuprofen and tylenol as needed.    Conservative treatment:    Ice to knee for 20 minutes at least 1-2 times daily.  PT for ROM/strengthening to knee, hip and core.  OTC NSAIDS prn for pain.  Tylenol for pain.    Imaging:    All imaging from today was reviewed by myself and explained to the patient.       Injection:    The risks and benefits of the injection (which include but are not limited to: infection, bleeding,damage to nerve/artery, need for further intervention), as well as the risks and benefits of all alternative treatments were explained and understood.  The patient elected to proceed with injection.  The procedure was done with aseptic technique, and the patient tolerated the procedure well with no complications.  A corticosteroid injection was performed.      Surgery:     No surgery is recommended at this point, continue with conservative treatment plan as noted.      Follow up:    No follow-ups on file.        Chief Complaint   Patient presents with    Right Knee - Pain       History of Present Illness:    The patient is a 74 y.o. female whose occupation is retired,  seen in clinic for evaluation of right knee pain.  Patient states that she has been having posterior knee pain for the past couple of years that has since worsened in the past year. States  that it is sharp with going down stairs, ambulating and has limited her activity levels. Has tried adjustments with chiropractor which allowed some relief but is not long lasting. Has not tried injection or physical therapy. Denies any previous injury and denies any numbness or tingling. Has not had any locking, catching or the knee and knee feels stable.         Knee Surgical History:  None    Past Medical, Social and Family History:  Past Medical History:   Diagnosis Date    Allergic 1987    Pneumovax and Roses    Anxiety     Asthma     Atypical chest pain 06/11/2020    Burping 7/17/2018    Cat bite of hand 06/29/2021    Chronic fatigue 9/5/2018    Disease of thyroid gland     GERD (gastroesophageal reflux disease)     Hearing loss     Heart murmur     Medicare annual wellness visit, subsequent 01/05/2021    Migraine     Peripheral vertigo     Plantar fasciitis 2018 [facility]    Had Cortesone injection and wore a walking boot  for 6ish weeks. Right foot.    Raynaud disease     S/P trigger finger release     Skin tag 2017 [facility]    PCP removed it.    Sleep apnea     Strain of left trapezius muscle 07/19/2019    Tinea corporis 1984    Was treated and had never returned.    Varicella 1955    Mild case as a young child    Visual disturbance      Past Surgical History:   Procedure Laterality Date    CATARACT EXTRACTION      COLON SURGERY      EYE SURGERY  2019    GLAUCOMA SURGERY      HALLUX VALGUS CORRECTION Right 06/19/2007    INCISION TENDON SHEATH HAND  05/23/2011    Release of A1 pulley    KNEE ARTHROSCOPY W/ MENISCAL REPAIR Left 2003    KNEE SURGERY  2005    NE ESOPHAGOGASTRODUODENOSCOPY TRANSORAL DIAGNOSTIC N/A 08/22/2018    Procedure: ESOPHAGOGASTRODUODENOSCOPY (EGD);  Surgeon: Luis Mohan MD;  Location: [facility];  Service: Gastroenterology    NE TENDON SHEATH INCISION Left 07/11/2019    Procedure: RELEASE TRIGGER FINGER left ring;  Surgeon: Tim Mercado MD;  Location: [facility];  Service: Orthopedics     TOENAIL EXCISION  2005 [facility]    Left  great and left pinky toes.    TONSILLECTOMY  1953    TRIGGER FINGER RELEASE      TUBAL LIGATION  1977    UPPER GASTROINTESTINAL ENDOSCOPY       Allergies   Allergen Reactions    Pneumococcal Polysaccharide Vaccine Fever     Other reaction(s): Chills, fever and mottling  Category: Allergy;     Corrie Allergic Rhinitis, Headache, Nasal Congestion and Visual Disturbance    Timoptic [Timolol] Eye Swelling     Current Outpatient Medications on File Prior to Visit   Medication Sig Dispense Refill    amLODIPine (NORVASC) 5 mg tablet Take 1 tablet (5 mg total) by mouth daily 90 tablet 3    aspirin 81 mg chewable tablet Chew 1 tablet daily      cholecalciferol (VITAMIN D3) 1,000 units tablet Take 1,000 Units by mouth daily      citalopram (CeleXA) 20 mg tablet Take 1 tablet (20 mg total) by mouth daily 90 tablet 2    diphenoxylate-atropine (LOMOTIL) 2.5-0.025 mg per tablet Take 1 tablet by mouth daily as needed (as needed) 30 tablet 0    famotidine (PEPCID) 20 mg tablet Take 1 tablet (20 mg total) by mouth 2 (two) times a day 180 tablet 1    ibuprofen (MOTRIN) 200 mg tablet Take 200 mg by mouth every 6 (six) hours as needed for mild pain      Lactobacillus (Acidophilus) 100 MG CAPS Take by mouth      levothyroxine 25 mcg tablet Take 1 tablet (25 mcg total) by mouth daily 90 tablet 3    meloxicam (MOBIC) 15 mg tablet TAKE 1 TABLET BY MOUTH DAILY AS  NEEDED FOR MILD OR MODERATE PAIN 90 tablet 1    Multiple Vitamin (MULTIVITAMIN) tablet Take 1 tablet by mouth daily      pantoprazole (PROTONIX) 40 mg tablet TAKE 1 TABLET BY MOUTH DAILY 90 tablet 1    pravastatin (PRAVACHOL) 10 mg tablet Take 1 tablet (10 mg total) by mouth daily at bedtime 90 tablet 3     No current facility-administered medications on file prior to visit.     Social History     Socioeconomic History    Marital status: /Civil Union     Spouse name: Not on file    Number of children: Not on file    Years of education: Not  on file    Highest education level: Not on file   Occupational History    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)   Tobacco Use    Smoking status: Never    Smokeless tobacco: Never   Vaping Use    Vaping status: Never Used   Substance and Sexual Activity    Alcohol use: Yes     Alcohol/week: 3.0 standard drinks of alcohol     Types: 3 Cans of beer per week     Comment: Per week    Drug use: Never    Sexual activity: Not on file   Other Topics Concern    Not on file   Social History Narrative    Daily coffee consumption (1 cup/day)    Patient has living will    Uses safety equipment - seatbelts     Social Determinants of Health     Financial Resource Strain: Low Risk  (8/2/2023)    Overall Financial Resource Strain (CARDIA)     Difficulty of Paying Living Expenses: Not hard at all   Food Insecurity: Not on file   Transportation Needs: No Transportation Needs (8/2/2023)    PRAPARE - Transportation     Lack of Transportation (Medical): No     Lack of Transportation (Non-Medical): No   Physical Activity: Not on file   Stress: Not on file   Social Connections: Not on file   Intimate Partner Violence: Not on file   Housing Stability: Not on file         I have reviewed the past medical, surgical, social and family history, medications and allergies as documented in the EMR.    Review of systems: ROS is negative other than that noted in the HPI.  Constitutional: Negative for fatigue and fever.   HENT: Negative for sore throat.    Respiratory: Negative for shortness of breath.    Cardiovascular: Negative for chest pain.   Gastrointestinal: Negative for abdominal pain.   Endocrine: Negative for cold intolerance and heat intolerance.   Genitourinary: Negative for flank pain.   Musculoskeletal: Negative for back pain.   Skin: Negative for rash.   Allergic/Immunologic: Negative for immunocompromised state.   Neurological: Negative for dizziness.   Psychiatric/Behavioral:  "Negative for agitation.      Physical Exam:    Height 5' 1\" (1.549 m), weight 74.4 kg (164 lb).    General/Constitutional: NAD, well developed, well nourished  HENT: Normocephalic, atraumatic  CV: Intact distal pulses, regular rate  Resp: No respiratory distress or labored breathing  Lymphatic: No lymphadenopathy palpated  Neuro: Alert and Oriented x 3, no focal deficits  Psych: Normal mood, normal affect, normal judgement, normal behavior  Skin: Warm, dry, no rashes, no erythema      Knee Exam (focused):  Visual inspection of the right knee demonstrates normal contour without atrophy.   No previous incisions   There is no significant erythema or edema.    No significant joint effusion   Range of motion is full from 0-130 degrees of flexion   Able to straight leg raise   non tender to palpation  - medial joint line tenderness, - lateral joint line tenderness  - medial Gilbert's, - lateral Gilbert's  1A Lachman exam, stable posterior drawer  Stable to varus and valgus stress at both 0 and 30°  Patella tracks normally.  No J sign.  No apprehension.  Translation is approximately 2 quadrants and is equal to the contralateral side  Patellar eversion is similar to the contralateral side    Examination of the patient's ipsilateral hip demonstrates full painless range of motion.  No crepitus.      LE NV Exam: +2 DP/PT pulses bilaterally  Sensation intact to light touch L2-S1 bilaterally     Bilateral hip ROM demonstrates no pain actively or passively    No calf tenderness to palpation bilaterally    Knee Imaging    X-rays of the right knee were reviewed, which demonstrate mild tricompartmental osteoarthritis without acute osseous abnormality.  I do not currently have a radiology reading from [facility], but will check the result once the reading is performed.      Large joint arthrocentesis: R knee  Universal Protocol:  Consent: Verbal consent obtained. Written consent not obtained.  Risks and benefits: risks, benefits " "and alternatives were discussed  Consent given by: patient  Time out: Immediately prior to procedure a \"time out\" was called to verify the correct patient, procedure, equipment, support staff and site/side marked as required.  Timeout called at: 9/12/2024 12:32 PM.  Patient understanding: patient states understanding of the procedure being performed  Relevant documents: relevant documents present and verified  Test results: test results available and properly labeled  Site marked: the operative site was marked  Radiology Images displayed and confirmed. If images not available, report reviewed: imaging studies available  Patient identity confirmed: verbally with patient, provided demographic data and hospital-assigned identification number  Supporting Documentation  Indications: pain and joint swelling   Procedure Details  Location: knee - R knee  Preparation: Patient was prepped and draped in the usual sterile fashion  Needle size: 18 G  Ultrasound guidance: no  Approach: anterolateral  Medications administered: 4 mL bupivacaine 0.25 %; 40 mg triamcinolone acetonide 40 mg/mL    Patient tolerance: patient tolerated the procedure well with no immediate complications  Dressing:  Sterile dressing applied           Scribe Attestation      I,:  Divya Cosme PA-C am acting as a scribe while in the presence of the attending physician.:       I,:  Joss Tripp DO personally performed the services described in this documentation    as scribed in my presence.:            "

## 2024-09-13 ENCOUNTER — TELEPHONE (OUTPATIENT)
Age: 74
End: 2024-09-13

## 2024-09-13 NOTE — TELEPHONE ENCOUNTER
Caller: Patient    Doctor: Dr. Tripp    Reason for call:  Patient calling asking if the receipt for copay can be mailed to her home address.    Call back#: [phone]     Bactrim Counseling:  I discussed with the patient the risks of sulfa antibiotics including but not limited to GI upset, allergic reaction, drug rash, diarrhea, dizziness, photosensitivity, and yeast infections.  Rarely, more serious reactions can occur including but not limited to aplastic anemia, agranulocytosis, methemoglobinemia, blood dyscrasias, liver or kidney failure, lung infiltrates or desquamative/blistering drug rashes.

## 2024-09-13 NOTE — TELEPHONE ENCOUNTER
Caller: Patient     Doctor: Dr. Tripp    Reason for call: Patient also stating that she had a CSI in her knee and she was wondering if she can take Ibuprofen today.  She can be reached at the number below.     Call back#: [phone]

## 2024-09-19 ENCOUNTER — APPOINTMENT (OUTPATIENT)
Dept: RADIOLOGY | Facility: HOSPITAL | Age: 74
End: 2024-09-19
Payer: COMMERCIAL

## 2024-09-19 ENCOUNTER — CLINICAL SUPPORT (OUTPATIENT)
Dept: FAMILY MEDICINE CLINIC | Facility: CLINIC | Age: 74
End: 2024-09-19
Payer: COMMERCIAL

## 2024-09-19 ENCOUNTER — HOSPITAL ENCOUNTER (EMERGENCY)
Facility: HOSPITAL | Age: 74
Discharge: HOME | End: 2024-09-19
Attending: EMERGENCY MEDICINE
Payer: COMMERCIAL

## 2024-09-19 VITALS
TEMPERATURE: 97.8 F | HEART RATE: 90 BPM | SYSTOLIC BLOOD PRESSURE: 173 MMHG | RESPIRATION RATE: 18 BRPM | OXYGEN SATURATION: 98 % | DIASTOLIC BLOOD PRESSURE: 78 MMHG

## 2024-09-19 DIAGNOSIS — I10 PRIMARY HYPERTENSION: Primary | ICD-10-CM

## 2024-09-19 DIAGNOSIS — M25.561 ACUTE PAIN OF RIGHT KNEE: Primary | ICD-10-CM

## 2024-09-19 DIAGNOSIS — R73.01 IMPAIRED FASTING GLUCOSE: ICD-10-CM

## 2024-09-19 DIAGNOSIS — E03.9 ACQUIRED HYPOTHYROIDISM: ICD-10-CM

## 2024-09-19 DIAGNOSIS — E78.5 HYPERLIPIDEMIA, UNSPECIFIED HYPERLIPIDEMIA TYPE: ICD-10-CM

## 2024-09-19 PROCEDURE — 73564 X-RAY EXAM KNEE 4 OR MORE: CPT

## 2024-09-19 PROCEDURE — 36415 COLL VENOUS BLD VENIPUNCTURE: CPT | Performed by: FAMILY MEDICINE

## 2024-09-19 PROCEDURE — 99284 EMERGENCY DEPT VISIT MOD MDM: CPT | Performed by: EMERGENCY MEDICINE

## 2024-09-19 PROCEDURE — 99283 EMERGENCY DEPT VISIT LOW MDM: CPT

## 2024-09-20 ENCOUNTER — TELEPHONE (OUTPATIENT)
Dept: OBGYN CLINIC | Facility: HOSPITAL | Age: 74
End: 2024-09-20

## 2024-09-20 ENCOUNTER — TELEPHONE (OUTPATIENT)
Dept: OBGYN CLINIC | Facility: CLINIC | Age: 74
End: 2024-09-20

## 2024-09-20 LAB
ALBUMIN SERPL-MCNC: 4.4 G/DL (ref 3.8–4.8)
ALP SERPL-CCNC: 48 IU/L (ref 44–121)
ALT SERPL-CCNC: 15 IU/L (ref 0–32)
AST SERPL-CCNC: 20 IU/L (ref 0–40)
BASOPHILS # BLD AUTO: 0 X10E3/UL (ref 0–0.2)
BASOPHILS NFR BLD AUTO: 0 %
BILIRUB SERPL-MCNC: 0.5 MG/DL (ref 0–1.2)
BUN SERPL-MCNC: 20 MG/DL (ref 8–27)
BUN/CREAT SERPL: 23 (ref 12–28)
CALCIUM SERPL-MCNC: 9.3 MG/DL (ref 8.7–10.3)
CHLORIDE SERPL-SCNC: 103 MMOL/L (ref 96–106)
CHOLEST SERPL-MCNC: 179 MG/DL (ref 100–199)
CO2 SERPL-SCNC: 23 MMOL/L (ref 20–29)
CREAT SERPL-MCNC: 0.88 MG/DL (ref 0.57–1)
EGFR: 68 ML/MIN/1.73
EOSINOPHIL # BLD AUTO: 0.4 X10E3/UL (ref 0–0.4)
EOSINOPHIL NFR BLD AUTO: 5 %
ERYTHROCYTE [DISTWIDTH] IN BLOOD BY AUTOMATED COUNT: 12.1 % (ref 11.7–15.4)
GLOBULIN SER-MCNC: 2.4 G/DL (ref 1.5–4.5)
GLUCOSE SERPL-MCNC: 101 MG/DL (ref 70–99)
HBA1C MFR BLD: 6.2 % (ref 4.8–5.6)
HCT VFR BLD AUTO: 39.5 % (ref 34–46.6)
HDLC SERPL-MCNC: 67 MG/DL
HGB BLD-MCNC: 12.9 G/DL (ref 11.1–15.9)
IMM GRANULOCYTES # BLD: 0 X10E3/UL (ref 0–0.1)
IMM GRANULOCYTES NFR BLD: 0 %
LDLC SERPL CALC-MCNC: 100 MG/DL (ref 0–99)
LDLC/HDLC SERPL: 1.5 RATIO (ref 0–3.2)
LYMPHOCYTES # BLD AUTO: 2.3 X10E3/UL (ref 0.7–3.1)
LYMPHOCYTES NFR BLD AUTO: 32 %
MCH RBC QN AUTO: 30.9 PG (ref 26.6–33)
MCHC RBC AUTO-ENTMCNC: 32.7 G/DL (ref 31.5–35.7)
MCV RBC AUTO: 95 FL (ref 79–97)
MONOCYTES # BLD AUTO: 1 X10E3/UL (ref 0.1–0.9)
MONOCYTES NFR BLD AUTO: 13 %
NEUTROPHILS # BLD AUTO: 3.5 X10E3/UL (ref 1.4–7)
NEUTROPHILS NFR BLD AUTO: 50 %
PLATELET # BLD AUTO: 244 X10E3/UL (ref 150–450)
POTASSIUM SERPL-SCNC: 4.4 MMOL/L (ref 3.5–5.2)
PROT SERPL-MCNC: 6.8 G/DL (ref 6–8.5)
RBC # BLD AUTO: 4.17 X10E6/UL (ref 3.77–5.28)
SL AMB T4, FREE (DIRECT): 1.26 NG/DL (ref 0.82–1.77)
SL AMB VLDL CHOLESTEROL CALC: 12 MG/DL (ref 5–40)
SODIUM SERPL-SCNC: 141 MMOL/L (ref 134–144)
TRIGL SERPL-MCNC: 65 MG/DL (ref 0–149)
TSH SERPL DL<=0.005 MIU/L-ACNC: 4.61 UIU/ML (ref 0.45–4.5)
WBC # BLD AUTO: 7.1 X10E3/UL (ref 3.4–10.8)

## 2024-09-20 NOTE — TELEPHONE ENCOUNTER
Caller: Fiorella    Doctor: Joss Tripp    Reason for call: Patient just saw you for her right knee, and then twisted her knee which exacerbated her pain. Seen in ED Xray was done, ER told her to contact you to have her get an MRI done of her knee.  Patient is also asking if something can be prescribed for her pain?  Also, asking if she can if she should keep elevating it or do you want her to keep walking/moving instead. She was also given crutches at the ED.     Pain Scale: 10/10    Call back#: [phone]

## 2024-09-20 NOTE — TELEPHONE ENCOUNTER
Called and spoke to Fiorella, patient states that she had a twisting injury and had significant 10/10 pain yewsterday, went to the ED and was given crutches, ED mentioned she needed an MRI. At this time I am recommending her to continue taking Meloxicam as it has been allowing pain relief and recommended taking Tylenol in addition if needed, discussed that she should not take any other NSAID's while using the meloxicam. At this time discussed that don't believe she needs an MRI, discussed that if she is still continuing to have significant pain in 1.5-2 weeks that we can order an MRI. Discussed that she is able to continue using crutches as needed for comfort but explained that she is still able to ambulate as tolerated and can elevate and ice the knee. Patient is in agreement and happy with this plan.    Divya Cosme PA-C

## 2024-09-20 NOTE — ED PROVIDER NOTES
1. Acute pain of right knee      ED Disposition       ED Disposition   Discharge    Condition   Stable    Date/Time   Thu Sep 19, 2024 10:20 PM    Comment   Fiorella Ashraf discharge to home/self care.                   Assessment & Plan       Medical Decision Making    74 y.o. female presenting for acute on chronic right knee pain.  Will obtain x-ray to evaluate for acute fracture.  Ddx includes ruptured Baker's cyst, meniscus injury or other soft tissue injury.   There is no tenderness in the right ankle or right hip to suspect fracture in these areas.    I have reviewed preliminary ED interpretation of x-rays with the patient. The patient understands that their x-rays will be interpreted independently by a radiologist at a later time. The patient is agreeable to discharge at this time and understands that they may be contacted after discharge from the emergency department pending formal xray interpretation by radiology.     I have discussed with the patient our plan to discharge them from the ED and the patient is in agreement with this plan. The patient was provided a written after visit summary with strict RTED precautions.     Discharge Plan: Will provide crutches for assistance with ambulation.  Encouraged continue meloxicam as previously prescribed.  Encouraged rest, ice, elevation and Tylenol as needed for symptoms.    Followup: I have discussed with the patient plan to follow up with Orthopedics. Contact information provided in AVS.    Amount and/or Complexity of Data Reviewed  Radiology: ordered and independent interpretation performed.                     Medications - No data to display    History of Present Illness       Fiorella Ashraf is a 74 y.o. year old female with PMH of right knee arthritis presenting to the [facility] ED for right knee pain. Patient was attempting to separate her dog's earlier this afternoon when she twisted her right knee. She had sudden onset of pain in the right knee region.  Since that time she has had pain radiating from the outer aspect of the right lower leg traveling up to the knee and posterior right upper leg.  Patient reports longstanding history of right knee discomfort and was recently diagnosed with arthritis by orthopedic surgery. She has no weakness/numbness in lower extremity. Her pain is worsened when attempting to bear weight. The patient has taken meloxicam at home for symptomatic treatment.      History provided by:  Medical records and patient   used: No    Knee Pain  Associated symptoms: no back pain, no fever and no neck pain        Review of Systems   Constitutional:  Negative for chills and fever.   Respiratory:  Negative for shortness of breath.    Cardiovascular:  Negative for chest pain.   Gastrointestinal:  Negative for abdominal pain, nausea and vomiting.   Genitourinary:  Negative for flank pain.   Musculoskeletal:  Positive for arthralgias and myalgias. Negative for back pain, joint swelling, neck pain and neck stiffness.   Skin:  Negative for wound.   Neurological:  Negative for weakness and numbness.   All other systems reviewed and are negative.          Objective     ED Triage Vitals   Temperature Pulse Blood Pressure Respirations SpO2 Patient Position - Orthostatic VS   09/19/24 2117 09/19/24 2118 09/19/24 2118 09/19/24 2118 09/19/24 2118 09/19/24 2118   97.8 °F (36.6 °C) 90 (!) 173/78 18 98 % Sitting      Temp Source Heart Rate Source BP Location FiO2 (%) Pain Score    09/19/24 2117 09/19/24 2118 09/19/24 2118 -- --    Temporal Monitor Right arm          Physical Exam  Vitals and nursing note reviewed.   Constitutional:       General: She is not in acute distress.     Appearance: Normal appearance. She is well-developed. She is not ill-appearing, toxic-appearing or diaphoretic.   HENT:      Head: Normocephalic and atraumatic.      Nose: No congestion or rhinorrhea.   Eyes:      General:         Right eye: No discharge.         Left  eye: No discharge.   Cardiovascular:      Rate and Rhythm: Normal rate and regular rhythm.   Pulmonary:      Effort: Pulmonary effort is normal. No accessory muscle usage or respiratory distress.      Breath sounds: Normal breath sounds. No stridor. No decreased breath sounds, wheezing, rhonchi or rales.   Abdominal:      Palpations: Abdomen is soft.   Musculoskeletal:      Cervical back: Normal range of motion. No rigidity.      Right hip: No tenderness or bony tenderness. Normal range of motion.      Left hip: No tenderness or bony tenderness. Normal range of motion.      Right upper leg: No tenderness.      Left upper leg: No tenderness.      Right knee: No swelling, effusion, erythema, bony tenderness or crepitus. Normal range of motion. Tenderness present over the lateral joint line. Normal alignment.      Right lower leg: No tenderness. No edema.      Left lower leg: No tenderness. No edema.      Right ankle: No tenderness. Normal range of motion.      Right Achilles Tendon: No tenderness or defects. Stafford's test negative.      Left ankle: No tenderness. Normal range of motion.      Right foot: Normal range of motion. No swelling, tenderness or bony tenderness.      Left foot: Normal range of motion. No swelling, tenderness or bony tenderness.   Skin:     Capillary Refill: Capillary refill takes less than 2 seconds.      Findings: No rash.   Neurological:      Mental Status: She is alert and oriented to person, place, and time.      Comments: 5/5 strength b/l LE.  Sensation grossly intact throughout.     Psychiatric:         Mood and Affect: Mood normal.         Behavior: Behavior normal.         Labs Reviewed - No data to display  XR knee 4+ vw right injury   ED Interpretation by Ethan Coe DO (09/19 2220)   Arthritic changes. No acute fracture or dislocation.          Procedures    ED Medication and Procedure Management   Prior to Admission Medications   Prescriptions Last Dose Informant Patient  Reported? Taking?   Lactobacillus (Acidophilus) 100 MG CAPS  Self Yes No   Sig: Take by mouth   Multiple Vitamin (MULTIVITAMIN) tablet  Self Yes No   Sig: Take 1 tablet by mouth daily   amLODIPine (NORVASC) 5 mg tablet  Self No No   Sig: Take 1 tablet (5 mg total) by mouth daily   aspirin 81 mg chewable tablet  Self Yes No   Sig: Chew 1 tablet daily   cholecalciferol (VITAMIN D3) 1,000 units tablet  Self Yes No   Sig: Take 1,000 Units by mouth daily   citalopram (CeleXA) 20 mg tablet  Self No No   Sig: Take 1 tablet (20 mg total) by mouth daily   diphenoxylate-atropine (LOMOTIL) 2.5-0.025 mg per tablet  Self No No   Sig: Take 1 tablet by mouth daily as needed (as needed)   famotidine (PEPCID) 20 mg tablet  Self No No   Sig: Take 1 tablet (20 mg total) by mouth 2 (two) times a day   ibuprofen (MOTRIN) 200 mg tablet  Self Yes No   Sig: Take 200 mg by mouth every 6 (six) hours as needed for mild pain   levothyroxine 25 mcg tablet  Self No No   Sig: Take 1 tablet (25 mcg total) by mouth daily   meloxicam (MOBIC) 15 mg tablet  Self No No   Sig: TAKE 1 TABLET BY MOUTH DAILY AS  NEEDED FOR MILD OR MODERATE PAIN   pantoprazole (PROTONIX) 40 mg tablet  Self No No   Sig: TAKE 1 TABLET BY MOUTH DAILY   pravastatin (PRAVACHOL) 10 mg tablet  Self No No   Sig: Take 1 tablet (10 mg total) by mouth daily at bedtime      Facility-Administered Medications: None     Discharge Medication List as of 9/19/2024 10:22 PM        CONTINUE these medications which have NOT CHANGED    Details   amLODIPine (NORVASC) 5 mg tablet Take 1 tablet (5 mg total) by mouth daily, Starting Wed 2/21/2024, Normal      aspirin 81 mg chewable tablet Chew 1 tablet daily, Historical Med      cholecalciferol (VITAMIN D3) 1,000 units tablet Take 1,000 Units by mouth daily, Historical Med      citalopram (CeleXA) 20 mg tablet Take 1 tablet (20 mg total) by mouth daily, Starting Wed 2/21/2024, Normal      diphenoxylate-atropine (LOMOTIL) 2.5-0.025 mg per tablet Take  1 tablet by mouth daily as needed (as needed), Starting Mon 8/12/2024, Normal      famotidine (PEPCID) 20 mg tablet Take 1 tablet (20 mg total) by mouth 2 (two) times a day, Starting Sun 8/11/2024, Until Fri 2/7/2025, Normal      ibuprofen (MOTRIN) 200 mg tablet Take 200 mg by mouth every 6 (six) hours as needed for mild pain, Historical Med      Lactobacillus (Acidophilus) 100 MG CAPS Take by mouth, Historical Med      levothyroxine 25 mcg tablet Take 1 tablet (25 mcg total) by mouth daily, Starting Wed 2/21/2024, Normal      meloxicam (MOBIC) 15 mg tablet TAKE 1 TABLET BY MOUTH DAILY AS  NEEDED FOR MILD OR MODERATE PAIN, Normal      Multiple Vitamin (MULTIVITAMIN) tablet Take 1 tablet by mouth daily, Historical Med      pantoprazole (PROTONIX) 40 mg tablet TAKE 1 TABLET BY MOUTH DAILY, Starting Thu 4/25/2024, Normal      pravastatin (PRAVACHOL) 10 mg tablet Take 1 tablet (10 mg total) by mouth daily at bedtime, Starting Wed 2/21/2024, Normal           No discharge procedures on file.     Ethan Coe, DO  09/20/24 0214

## 2024-09-20 NOTE — DISCHARGE INSTRUCTIONS
You have been seen for knee pain. Please take mobic and tylenol for pain. Apply ice to the area and use crutches as provided. Return to the emergency department if you develop worsening pain, weakness/numbness or any other symptoms of concern. Please follow up with orthopedics by calling the number provided.    Your blood pressure is elevated on your visit today. When left untreated, the damage that high blood pressure does to your circulatory system is a significant contributing factor to heart attack, stroke, chronic kidney disease and other health threats. Please arrange for a blood pressure recheck with a PCP within the next week for further evaluation.

## 2024-09-24 ENCOUNTER — TELEPHONE (OUTPATIENT)
Dept: OBGYN CLINIC | Facility: HOSPITAL | Age: 74
End: 2024-09-24

## 2024-09-24 NOTE — TELEPHONE ENCOUNTER
Caller: Patient     Doctor: Dr. Tripp     Reason for call:  Patient calling asking if the receipt for copay for visit on 9/12/24 can be mailed to her home address. (She had initially requested on 9/13/24 but never received it. Could you please put it in the mail again for her. Thank you.     Call back#: [phone]

## 2024-09-27 ENCOUNTER — OFFICE VISIT (OUTPATIENT)
Dept: FAMILY MEDICINE CLINIC | Facility: CLINIC | Age: 74
End: 2024-09-27
Payer: COMMERCIAL

## 2024-09-27 VITALS
TEMPERATURE: 98.2 F | DIASTOLIC BLOOD PRESSURE: 60 MMHG | HEART RATE: 72 BPM | OXYGEN SATURATION: 97 % | SYSTOLIC BLOOD PRESSURE: 120 MMHG | BODY MASS INDEX: 30.58 KG/M2 | HEIGHT: 61 IN | WEIGHT: 162 LBS

## 2024-09-27 DIAGNOSIS — M79.661 RIGHT CALF PAIN: ICD-10-CM

## 2024-09-27 DIAGNOSIS — Z00.00 MEDICARE ANNUAL WELLNESS VISIT, SUBSEQUENT: Primary | ICD-10-CM

## 2024-09-27 DIAGNOSIS — F41.9 ANXIETY: ICD-10-CM

## 2024-09-27 DIAGNOSIS — M71.21: ICD-10-CM

## 2024-09-27 DIAGNOSIS — M25.561 RIGHT MEDIAL KNEE PAIN: ICD-10-CM

## 2024-09-27 DIAGNOSIS — J45.20 MILD INTERMITTENT REACTIVE AIRWAY DISEASE WITHOUT COMPLICATION: ICD-10-CM

## 2024-09-27 DIAGNOSIS — J45.20 MILD INTERMITTENT ASTHMA WITHOUT COMPLICATION: ICD-10-CM

## 2024-09-27 PROBLEM — M65.321 TRIGGER FINGER, RIGHT INDEX FINGER: Status: ACTIVE | Noted: 2022-02-01

## 2024-09-27 PROCEDURE — G0439 PPPS, SUBSEQ VISIT: HCPCS | Performed by: FAMILY MEDICINE

## 2024-09-27 PROCEDURE — 99213 OFFICE O/P EST LOW 20 MIN: CPT | Performed by: FAMILY MEDICINE

## 2024-09-27 RX ORDER — ACETAMINOPHEN 500 MG
500 TABLET ORAL EVERY 6 HOURS PRN
COMMUNITY

## 2024-09-27 RX ORDER — CITALOPRAM HYDROBROMIDE 40 MG/1
40 TABLET ORAL DAILY
Qty: 90 TABLET | Refills: 2 | Status: SHIPPED | OUTPATIENT
Start: 2024-09-27

## 2024-09-27 RX ORDER — ALBUTEROL SULFATE 90 UG/1
2 INHALANT RESPIRATORY (INHALATION) EVERY 6 HOURS PRN
Qty: 6.7 G | Refills: 0 | Status: SHIPPED | OUTPATIENT
Start: 2024-09-27

## 2024-09-27 NOTE — PROGRESS NOTES
Ambulatory Visit  Name: Fiorella Ashraf      : 1950      MRN: [id number]  Encounter Provider: Carmen Ortiz DO  Encounter Date: 2024   Encounter department: [facility], [place]    Assessment & Plan  Medicare annual wellness visit, subsequent         Right medial knee pain  Xray showed mildl arthritis, not much improvement with cortisone but reinjured 1 week after injection.  Orders:    VAS VENOUS DUPLEX -LOWER LIMB UNILATERAL; Future    Cyst, baker's knee, right  R/o popliteal cyst vs DVT  Orders:    VAS VENOUS DUPLEX -LOWER LIMB UNILATERAL; Future    Right calf pain  Ultrasound left leg/ r/o popliteal cyst  Orders:    VAS VENOUS DUPLEX -LOWER LIMB UNILATERAL; Future    Anxiety    Orders:    citalopram (CeleXA) 40 mg tablet; Take 1 tablet (40 mg total) by mouth daily    Mild intermittent reactive airway disease without complication    Orders:    albuterol (Proventil HFA) 90 mcg/act inhaler; Inhale 2 puffs every 6 (six) hours as needed for shortness of breath    Mild intermittent asthma without complication  Requesting renewal of albuterol inhaler to use as needed for the winter.            Depression Screening and Follow-up Plan: Patient was screened for depression during today's encounter. They screened negative with a PHQ-2 score of 0.      Preventive health issues were discussed with patient, and age appropriate screening tests were ordered as noted in patient's After Visit Summary. Personalized health advice and appropriate referrals for health education or preventive services given if needed, as noted in patient's After Visit Summary.    History of Present Illness     Pt is here for medicare wellness.  Pt had a Fall onto her right knee, twisted the knee.   Seen by ortho  and had injection- without much relief  When standing, pain behind knee and on inside of knee.   Using ice packs, 2 500mg tylenol twice a day, ibuprofen   Had Rsv immunization  at [facility]    Some increased anxiety- requesting renewal of albuterol inhaler to use as needed.          Patient Care Team:  Carmen Ortiz DO as PCP - General (Family Medicine)  DO Carmen Good DO [facility], MD (Inactive) (Family Medicine)  Thony Connelly MD (Family Medicine)  Patrizia Pagan DO (Gastroenterology)  Carmen Ortiz DO (Family Medicine)    Review of Systems   Constitutional: Negative.  Negative for fatigue and fever.   HENT: Negative.     Eyes: Negative.    Respiratory: Negative.  Negative for cough.    Cardiovascular: Negative.    Gastrointestinal: Negative.    Endocrine: Negative.    Genitourinary: Negative.    Musculoskeletal:  Positive for arthralgias, gait problem and joint swelling.   Skin: Negative.    Allergic/Immunologic: Negative.    Psychiatric/Behavioral: Negative.       Medical History Reviewed by provider this encounter:  Tobacco  Allergies  Meds  Problems  Med Hx  Surg Hx  Fam Hx       Annual Wellness Visit Questionnaire   Fiorella is here for her Subsequent Wellness visit. Last Medicare Wellness visit information reviewed, patient interviewed, no change since last AWV.     Health Risk Assessment:   Patient rates overall health as good. Patient feels that their physical health rating is same. Patient is very satisfied with their life. Eyesight was rated as same. Hearing was rated as same. Patient feels that their emotional and mental health rating is same. Patients states they are never, rarely angry. Patient states they are sometimes unusually tired/fatigued. Pain experienced in the last 7 days has been some. Patient's pain rating has been 9/10. Patient states that she has experienced no weight loss or gain in last 6 months. Dx rt lateral meniscus tear, mild osteoarthritis and bakers cyst, then one week later i sprainef same..all on rt knee.    Depression Screening:   PHQ-2 Score: 0      Fall Risk Screening:   In the past year,  patient has experienced: no history of falling in past year      Urinary Incontinence Screening:   Patient has not leaked urine accidently in the last six months.     Home Safety:  Patient does not have trouble with stairs inside or outside of their home. Patient has working smoke alarms and has working carbon monoxide detector. Home safety hazards include: none.     Nutrition:   Current diet is Regular.     Medications:   Patient is currently taking over-the-counter supplements. OTC medications include: see medication list. Patient is able to manage medications.     Activities of Daily Living (ADLs)/Instrumental Activities of Daily Living (IADLs):   Walk and transfer into and out of bed and chair?: Yes  Dress and groom yourself?: Yes    Bathe or shower yourself?: Yes    Feed yourself? Yes  Do your laundry/housekeeping?: Yes  Manage your money, pay your bills and track your expenses?: Yes  Make your own meals?: Yes    Do your own shopping?: Yes    Durable Medical Equipment Suppliers  Crutches    Previous Hospitalizations:   Any hospitalizations or ED visits within the last 12 months?: No      Hospitalization Comments: 0 hospitalization 1 ER visit    Advance Care Planning:   Living will: Yes    Durable POA for healthcare: Yes    Advanced directive: Yes      PREVENTIVE SCREENINGS      Cardiovascular Screening:    General: Screening Not Indicated and History Lipid Disorder      Diabetes Screening:     General: Screening Current      Breast Cancer Screening:     General: Screening Current      Cervical Cancer Screening:    General: Screening Not Indicated      Lung Cancer Screening:     General: Screening Not Indicated      Hepatitis C Screening:    General: Screening Current    Screening, Brief Intervention, and Referral to Treatment (SBIRT)    Screening  Typical number of drinks in a day: 1  Typical number of drinks in a week: 4  Interpretation: Low risk drinking behavior.    AUDIT-C Screenin) How often did you  have a drink containing alcohol in the past year? 2 to 3 times a week  2) How many drinks did you have on a typical day when you were drinking in the past year? 1 to 2  3) How often did you have 6 or more drinks on one occasion in the past year? never    AUDIT-C Score: 3  Interpretation: Score 3-12 (female): POSITIVE screen for alcohol misuse    AUDIT Screenin) How often during the last year have you found that you were not able to stop drinking once you had started? 0 - never  5) How often during the last year have you failed to do what was normally expected from you because of drinking? 0 - never  6) How often during the last year have you needed a first drink in the morning to get yourself going after a heavy drinking session? 0 - never  7) How often during the last year have you had a feeling of guilt or remorse after drinking? 0 - never  8) How often during the last year have you been unable to remember what happened the night before because you had been drinking? 0 - never  9) Have you or someone else been injured as a result of your drinking? 0 - no  10) Has a relative or friend or a doctor or another health worker been concerned about your drinking or suggested you cut down? 0 - no    AUDIT Score: 3  Interpretation: Low risk alcohol consumption    Single Item Drug Screening:  How often have you used an illegal drug (including marijuana) or a prescription medication for non-medical reasons in the past year? never    Single Item Drug Screen Score: 0  Interpretation: Negative screen for possible drug use disorder    Social Determinants of Health     Financial Resource Strain: Low Risk  (2023)    Overall Financial Resource Strain ([facility])     Difficulty of Paying Living Expenses: Not hard at all   Food Insecurity: No Food Insecurity (2024)    Hunger Vital Sign     Worried About Running Out of Food in the Last Year: Never true     Ran Out of Food in the Last Year: Never true   Transportation Needs:  "No Transportation Needs (9/22/2024)    PRAPARE - Transportation     Lack of Transportation (Medical): No     Lack of Transportation (Non-Medical): No   Housing Stability: Low Risk  (9/22/2024)    Housing Stability Vital Sign     Unable to Pay for Housing in the Last Year: No     Number of Times Moved in the Last Year: 0     Homeless in the Last Year: No   Utilities: Not At Risk (9/22/2024)    [facility] Utilities     Threatened with loss of utilities: No     No results found.    Objective     /60   Pulse 72   Temp 98.2 °F (36.8 °C) (Tympanic)   Ht 5' 1\" (1.549 m)   Wt 73.5 kg (162 lb)   SpO2 97%   BMI 30.61 kg/m²     Physical Exam  Vitals and nursing note reviewed.   Constitutional:       Appearance: She is well-developed.   HENT:      Head: Normocephalic and atraumatic.      Right Ear: External ear normal.      Left Ear: External ear normal.      Nose: Nose normal.   Eyes:      Conjunctiva/sclera: Conjunctivae normal.      Pupils: Pupils are equal, round, and reactive to light.   Cardiovascular:      Rate and Rhythm: Normal rate and regular rhythm.      Heart sounds: Normal heart sounds.   Pulmonary:      Effort: Pulmonary effort is normal.      Breath sounds: Normal breath sounds.   Abdominal:      General: Bowel sounds are normal.      Palpations: Abdomen is soft.   Musculoskeletal:         General: Swelling, tenderness, deformity and signs of injury present. Normal range of motion.      Cervical back: Normal range of motion and neck supple.      Comments: Right knee, tenderness over medial joint line.   Good rom.   Fullness over posterior knee  Tenderness right calf with mild edema.   Skin:     General: Skin is warm and dry.   Neurological:      Mental Status: She is alert and oriented to person, place, and time.   Psychiatric:         Behavior: Behavior normal.         Thought Content: Thought content normal.         Judgment: Judgment normal.         "

## 2024-09-27 NOTE — PATIENT INSTRUCTIONS
"Patient Education     Hypothyroidism (underactive thyroid)   The Basics   Written by the doctors and editors at [facility]   What is hypothyroidism? -- Hypothyroidism happens when a gland in your neck, called the thyroid gland, makes too little thyroid hormone. This hormone controls how the body uses and stores energy (figure 1).  With a different condition, hyperthyroidism, the thyroid gland makes too much thyroid hormone. With hypothyroidism, it does not make enough. Doctors sometimes also use the term \"underactive thyroid.\"  What causes hypothyroidism? -- Different things can cause the thyroid gland to be unable to make enough thyroid hormone. These include:   Problems with the immune system - The immune system is the body's infection-fighting system. Sometimes, a person's immune system attacks healthy cells, such as cells in the thyroid. This is called \"chronic autoimmune thyroiditis\" or \"Hashimoto's thyroiditis.\" It is the most common cause of hypothyroidism in the .   Thyroidectomy - This is surgery to remove the thyroid gland.   Radioiodine therapy - This is a treatment used for hyperthyroidism. It often causes hypothyroidism because it destroys part of the thyroid gland.   Radiation in the neck area - High doses of radiation (for example, to treat cancer) can damage the thyroid gland.   Certain medicines  The treatment of hypothyroidism is the same no matter what caused it.  What are the symptoms of hypothyroidism? -- Some people with hypothyroidism have no symptoms. But most people feel tired. That can make it hard to know if a person has it, because a lot of conditions can make you tired.  Other symptoms of hypothyroidism include:   Lack of energy   Getting cold easily   Developing coarse or thin hair   Getting constipated (having too few bowel movements)  If it is not treated, hypothyroidism can also weaken and slow your heart. This can make you feel out of breath or tired when you exercise. It can also " cause swelling (fluid buildup) in your ankles. Untreated hypothyroidism can also increase your blood pressure and raise your cholesterol. Both of these things increase the risk of heart problems.  Hypothyroidism can disrupt monthly periods. It can also make it hard to get pregnant. In people who do get pregnant, hypothyroidism can cause problems. For instance, it can increase the chances of having a miscarriage. (A miscarriage is when a pregnancy ends on its own before 20 weeks.)  Is there a test for hypothyroidism? -- Yes. Your doctor or nurse can check for hypothyroidism using a simple blood test.  How is hypothyroidism treated? -- Treatment involves taking thyroid hormone pills every day. After you take the pills for about 6 weeks, your doctor or nurse will test your blood again. This is to make sure that the levels are where they should be. They might adjust your dose depending on the results. Most people with hypothyroidism need to keep taking thyroid pills for the rest of their life. This gives your body the right level of the hormone that it cannot make on its own.  Thyroid hormone pills come in different brand name and generic forms. All of the pills work equally well. If possible, stick with the same generic or brand name. But switching between pills does not cause problems for most people. Talk to your doctor or nurse if you want to switch for some reason.  Never change your dose of thyroid hormone on your own. Taking too much thyroid hormone can cause heart rhythm problems and even damage your bones.  What if I want to get pregnant? -- You can try to get pregnant. Many people with hypothyroidism have healthy pregnancies. But your doctor or nurse will most likely need to change your dose of thyroid hormone once you are pregnant. That's because you need more thyroid hormone during pregnancy. They will also measure your levels of thyroid hormone 4 weeks after any change in your dose, and at least once during  each trimester of pregnancy.  All topics are updated as new evidence becomes available and our peer review process is complete.  This topic retrieved from [employer] on: Feb 26, 2024.  Topic 66164 Version 11.0  Release: 32.2.4 - C32.56  © 2024 UpToDate, Inc. and/or its affiliates. All rights reserved.  figure 1: Thyroid and parathyroid glands     The thyroid is a butterfly-shaped gland in the middle of the neck. It sits just below the larynx (voice box). The thyroid makes 2 hormones, called T3 and T4, which control how the body uses and stores energy. The parathyroid glands are 4 small glands behind the thyroid. They make a hormone called parathyroid hormone, which helps control the amount of calcium in the blood.  Graphic 60832 Version 10.0  Consumer Information Use and Disclaimer   Disclaimer: This generalized information is a limited summary of diagnosis, treatment, and/or medication information. It is not meant to be comprehensive and should be used as a tool to help the user understand and/or assess potential diagnostic and treatment options. It does NOT include all information about conditions, treatments, medications, side effects, or risks that may apply to a specific patient. It is not intended to be medical advice or a substitute for the medical advice, diagnosis, or treatment of a health care provider based on the health care provider's examination and assessment of a patient's specific and unique circumstances. Patients must speak with a health care provider for complete information about their health, medical questions, and treatment options, including any risks or benefits regarding use of medications. This information does not endorse any treatments or medications as safe, effective, or approved for treating a specific patient. UpToDate, Inc. and its affiliates disclaim any warranty or liability relating to this information or the use thereof.The use of this information is governed by the Terms of Use,  available at https://www.woltersSpriguwer.com/en/know/clinical-effectiveness-terms. 2024© UpToDate, Inc. and its affiliates and/or licensors. All rights reserved.  Copyright   © 2024 UpToDate, Inc. and/or its affiliates. All rights reserved.  Medicare Preventive Visit Patient Instructions  Thank you for completing your Welcome to Medicare Visit or Medicare Annual Wellness Visit today. Your next wellness visit will be due in one year (9/28/2025).  The screening/preventive services that you may require over the next 5-10 years are detailed below. Some tests may not apply to you based off risk factors and/or age. Screening tests ordered at today's visit but not completed yet may show as past due. Also, please note that scanned in results may not display below.  Preventive Screenings:  Service Recommendations Previous Testing/Comments   Colorectal Cancer Screening  * Colonoscopy    * Fecal Occult Blood Test (FOBT)/Fecal Immunochemical Test (FIT)  * Fecal DNA/Cologuard Test  * Flexible Sigmoidoscopy Age: 45-75 years old   Colonoscopy: every 10 years (may be performed more frequently if at higher risk)  OR  FOBT/FIT: every 1 year  OR  Cologuard: every 3 years  OR  Sigmoidoscopy: every 5 years  Screening may be recommended earlier than age 45 if at higher risk for colorectal cancer. Also, an individualized decision between you and your healthcare provider will decide whether screening between the ages of 76-85 would be appropriate. Colonoscopy: 06/13/2012  FOBT/FIT: Not on file  Cologuard: Not on file  Sigmoidoscopy: Not on file          Breast Cancer Screening Age: 40+ years old  Frequency: every 1-2 years  Not required if history of left and right mastectomy Mammogram: 06/19/2024    Screening Current   Cervical Cancer Screening Between the ages of 21-29, pap smear recommended once every 3 years.   Between the ages of 30-65, can perform pap smear with HPV co-testing every 5 years.   Recommendations may differ for women with  a history of total hysterectomy, cervical cancer, or abnormal pap smears in past. Pap Smear: Not on file    Screening Not Indicated   Hepatitis C Screening Once for adults born between 1945 and 1965  More frequently in patients at high risk for Hepatitis C Hep C Antibody: 09/07/2018    Screening Current   Diabetes Screening 1-2 times per year if you're at risk for diabetes or have pre-diabetes Fasting glucose: No results in last 5 years (No results in last 5 years)  A1C: 6.2 % (9/19/2024)  Screening Current   Cholesterol Screening Once every 5 years if you don't have a lipid disorder. May order more often based on risk factors. Lipid panel: 09/19/2024    Screening Not Indicated  History Lipid Disorder     Other Preventive Screenings Covered by Medicare:  Abdominal Aortic Aneurysm (AAA) Screening: covered once if your at risk. You're considered to be at risk if you have a family history of AAA.  Lung Cancer Screening: covers low dose CT scan once per year if you meet all of the following conditions: (1) Age 55-77; (2) No signs or symptoms of lung cancer; (3) Current smoker or have quit smoking within the last 15 years; (4) You have a tobacco smoking history of at least 20 pack years (packs per day multiplied by number of years you smoked); (5) You get a written order from a healthcare provider.  Glaucoma Screening: covered annually if you're considered high risk: (1) You have diabetes OR (2) Family history of glaucoma OR (3)  aged 50 and older OR (4)  American aged 65 and older  Osteoporosis Screening: covered every 2 years if you meet one of the following conditions: (1) You're estrogen deficient and at risk for osteoporosis based off medical history and other findings; (2) Have a vertebral abnormality; (3) On glucocorticoid therapy for more than 3 months; (4) Have primary hyperparathyroidism; (5) On osteoporosis medications and need to assess response to drug therapy.   Last bone density  test (DXA Scan): 11/05/2013.  HIV Screening: covered annually if you're between the age of 15-65. Also covered annually if you are younger than 15 and older than 65 with risk factors for HIV infection. For pregnant patients, it is covered up to 3 times per pregnancy.    Immunizations:  Immunization Recommendations   Influenza Vaccine Annual influenza vaccination during flu season is recommended for all persons aged >= 6 months who do not have contraindications   Pneumococcal Vaccine   * Pneumococcal conjugate vaccine = PCV13 (Prevnar 13), PCV15 (Vaxneuvance), PCV20 (Prevnar 20)  * Pneumococcal polysaccharide vaccine = PPSV23 (Pneumovax) Adults 19-63 yo with certain risk factors or if 65+ yo  If never received any pneumonia vaccine: recommend Prevnar 20 (PCV20)  Give PCV20 if previously received 1 dose of PCV13 or PPSV23   Hepatitis B Vaccine 3 dose series if at intermediate or high risk (ex: diabetes, end stage renal disease, liver disease)   Respiratory syncytial virus (RSV) Vaccine - COVERED BY MEDICARE PART D  * RSVPreF3 (Arexvy) CDC recommends that adults 60 years of age and older may receive a single dose of RSV vaccine using shared clinical decision-making (SCDM)   Tetanus (Td) Vaccine - COST NOT COVERED BY MEDICARE PART B Following completion of primary series, a booster dose should be given every 10 years to maintain immunity against tetanus. Td may also be given as tetanus wound prophylaxis.   Tdap Vaccine - COST NOT COVERED BY MEDICARE PART B Recommended at least once for all adults. For pregnant patients, recommended with each pregnancy.   Shingles Vaccine (Shingrix) - COST NOT COVERED BY MEDICARE PART B  2 shot series recommended in those 19 years and older who have or will have weakened immune systems or those 50 years and older     Health Maintenance Due:      Topic Date Due    Breast Cancer Screening: Mammogram  06/19/2025    Hepatitis C Screening  Completed    Colorectal Cancer Screening   Discontinued     Immunizations Due:      Topic Date Due    Pneumococcal Vaccine: 65+ Years (2 of 2 - PCV) 01/01/2001    COVID-19 Vaccine (7 - 2023-24 season) 09/01/2024    Influenza Vaccine (1) 09/01/2024     Advance Directives   What are advance directives?  Advance directives are legal documents that state your wishes and plans for medical care. These plans are made ahead of time in case you lose your ability to make decisions for yourself. Advance directives can apply to any medical decision, such as the treatments you want, and if you want to donate organs.   What are the types of advance directives?  There are many types of advance directives, and each state has rules about how to use them. You may choose a combination of any of the following:  Living will:  This is a written record of the treatment you want. You can also choose which treatments you do not want, which to limit, and which to stop at a certain time. This includes surgery, medicine, IV fluid, and tube feedings.   Durable power of  for healthcare (DPAHC):  This is a written record that states who you want to make healthcare choices for you when you are unable to make them for yourself. This person, called a proxy, is usually a family member or a friend. You may choose more than 1 proxy.  Do not resuscitate (DNR) order:  A DNR order is used in case your heart stops beating or you stop breathing. It is a request not to have certain forms of treatment, such as CPR. A DNR order may be included in other types of advance directives.  Medical directive:  This covers the care that you want if you are in a coma, near death, or unable to make decisions for yourself. You can list the treatments you want for each condition. Treatment may include pain medicine, surgery, blood transfusions, dialysis, IV or tube feedings, and a ventilator (breathing machine).  Values history:  This document has questions about your views, beliefs, and how you feel and  think about life. This information can help others choose the care that you would choose.  Why are advance directives important?  An advance directive helps you control your care. Although spoken wishes may be used, it is better to have your wishes written down. Spoken wishes can be misunderstood, or not followed. Treatments may be given even if you do not want them. An advance directive may make it easier for your family to make difficult choices about your care.   Weight Management   Why it is important to manage your weight:  Being overweight increases your risk of health conditions such as heart disease, high blood pressure, type 2 diabetes, and certain types of cancer. It can also increase your risk for osteoarthritis, sleep apnea, and other respiratory problems. Aim for a slow, steady weight loss. Even a small amount of weight loss can lower your risk of health problems.  How to lose weight safely:  A safe and healthy way to lose weight is to eat fewer calories and get regular exercise. You can lose up about 1 pound a week by decreasing the number of calories you eat by 500 calories each day.   Healthy meal plan for weight management:  A healthy meal plan includes a variety of foods, contains fewer calories, and helps you stay healthy. A healthy meal plan includes the following:  Eat whole-grain foods more often.  A healthy meal plan should contain fiber. Fiber is the part of grains, fruits, and vegetables that is not broken down by your body. Whole-grain foods are healthy and provide extra fiber in your diet. Some examples of whole-grain foods are whole-wheat breads and pastas, oatmeal, brown rice, and bulgur.  Eat a variety of vegetables every day.  Include dark, leafy greens such as spinach, kale, luis greens, and mustard greens. Eat yellow and orange vegetables such as carrots, sweet potatoes, and winter squash.   Eat a variety of fruits every day.  Choose fresh or canned fruit (canned in its own juice  "or light syrup) instead of juice. Fruit juice has very little or no fiber.  Eat low-fat dairy foods.  Drink fat-free (skim) milk or 1% milk. Eat fat-free yogurt and low-fat cottage cheese. Try low-fat cheeses such as mozzarella and other reduced-fat cheeses.  Choose meat and other protein foods that are low in fat.  Choose beans or other legumes such as split peas or lentils. Choose fish, skinless poultry (chicken or turkey), or lean cuts of red meat (beef or pork). Before you cook meat or poultry, cut off any visible fat.   Use less fat and oil.  Try baking foods instead of frying them. Add less fat, such as margarine, sour cream, regular salad dressing and mayonnaise to foods. Eat fewer high-fat foods. Some examples of high-fat foods include french fries, doughnuts, ice cream, and cakes.  Eat fewer sweets.  Limit foods and drinks that are high in sugar. This includes candy, cookies, regular soda, and sweetened drinks.  Exercise:  Exercise at least 30 minutes per day on most days of the week. Some examples of exercise include walking, biking, dancing, and swimming. You can also fit in more physical activity by taking the stairs instead of the elevator or parking farther away from stores. Ask your healthcare provider about the best exercise plan for you.   Alcohol Use and Your Health    Drinking too much can harm your health.  Excessive alcohol use leads to about 88,000 death in the [place] each year, and shortens the life of those who diet by almost 30 years.  Further, excessive drinking cost the economy $249 billion in 2010.  Most excessive drinkers are not alcohol dependent.    Excessive alcohol use has immediate effects that increase the risk of many harmful health conditions.  These are most often the result of binge drinking.  Over time, excessive alcohol use can lead to the development of chronic diseases and other series health problems.    What is considered a \"drink\"?        Excessive alcohol use " includes:  Binge Drinking: For women, 4 or more drinks consumed on one occasion. For men, 5 or more drinks consumed on one occasion.  Heavy Drinking: For women, 8 or more drinks per week. For men, 15 or more drinks per week  Any alcohol used by pregnant women  Any alcohol used by those under the age of 21 years    If you choose to drink, do so in moderation:  Do not drink at all if you are under the age of 21, or if you are or may be pregnant, or have health problems that could be made worse by drinking.  For women, up to 1 drink per day  For men, up to 2 drinks a day    No one should begin drinking or drink more frequently based on potential health benefits    Short-Term Health Risks:  Injuries: motor vehicle crashes, falls, drownings, burns  Violence: homicide, suicide, sexual assault, intimate partner violence  Alcohol poisoning  Reproductive health: risky sexual behaviors, unintended prengnacy, sexually transmitted diseases, miscarriage, stillbirth, fetal alcohol syndrome    Long-Term Health Risks:  Chronic diseases: high blood pressure, heart disease, stroke, liver disease, digestive problems  Cancers: breast, mouth and throat, liver, colon  Learning and memory problems: dementia, poor school performance  Mental health: depression, anxiety, insomnia  Social problems: lost productivity, family problems, unemployment  Alcohol dependence    For support and more information:  Substance Abuse and Mental Health Services Administration  [address]  [place] [address]  Web Address: http://www.samhsa.gov    Alcoholics Anonymous        Web Address: http://www.aa.org    https://www.cdc.gov/alcohol/fact-sheets/alcohol-use.htm     © Copyright [employer] 2018 Information is for End User's use only and may not be sold, redistributed or otherwise used for commercial purposes. All illustrations and images included in CareNotes® are the copyrighted property of A.D.A.M., Inc. or [employer]

## 2024-09-29 PROBLEM — M79.661 RIGHT CALF PAIN: Status: ACTIVE | Noted: 2024-09-29

## 2024-09-29 PROBLEM — J45.909 REACTIVE AIRWAY DISEASE: Status: ACTIVE | Noted: 2024-09-29

## 2024-09-29 PROBLEM — M71.21: Status: ACTIVE | Noted: 2024-09-29

## 2024-09-29 NOTE — ASSESSMENT & PLAN NOTE
Orders:    albuterol (Proventil HFA) 90 mcg/act inhaler; Inhale 2 puffs every 6 (six) hours as needed for shortness of breath

## 2024-09-29 NOTE — ASSESSMENT & PLAN NOTE
Ultrasound left leg/ r/o popliteal cyst  Orders:    VAS VENOUS DUPLEX -LOWER LIMB UNILATERAL; Future

## 2024-09-29 NOTE — ASSESSMENT & PLAN NOTE
Xray showed mildl arthritis, not much improvement with cortisone but reinjured 1 week after injection.  Orders:    VAS VENOUS DUPLEX -LOWER LIMB UNILATERAL; Future

## 2024-10-02 ENCOUNTER — TELEPHONE (OUTPATIENT)
Dept: OBGYN CLINIC | Facility: CLINIC | Age: 74
End: 2024-10-02

## 2024-10-02 DIAGNOSIS — M25.561 ACUTE PAIN OF RIGHT KNEE: Primary | ICD-10-CM

## 2024-10-20 DIAGNOSIS — K21.9 GASTROESOPHAGEAL REFLUX DISEASE WITHOUT ESOPHAGITIS: ICD-10-CM

## 2024-10-20 RX ORDER — PANTOPRAZOLE SODIUM 40 MG/1
40 TABLET, DELAYED RELEASE ORAL DAILY
Qty: 90 TABLET | Refills: 1 | Status: SHIPPED | OUTPATIENT
Start: 2024-10-20

## 2024-10-23 ENCOUNTER — HOSPITAL ENCOUNTER (OUTPATIENT)
Dept: MRI IMAGING | Facility: HOSPITAL | Age: 74
Discharge: HOME | End: 2024-10-23
Payer: COMMERCIAL

## 2024-10-23 DIAGNOSIS — M25.561 ACUTE PAIN OF RIGHT KNEE: ICD-10-CM

## 2024-10-23 PROCEDURE — 73721 MRI JNT OF LWR EXTRE W/O DYE: CPT

## 2024-10-29 PROBLEM — Z00.00 MEDICARE ANNUAL WELLNESS VISIT, SUBSEQUENT: Status: RESOLVED | Noted: 2021-01-05 | Resolved: 2024-10-29

## 2024-11-07 ENCOUNTER — OFFICE VISIT (OUTPATIENT)
Dept: OBGYN CLINIC | Facility: CLINIC | Age: 74
End: 2024-11-07
Payer: COMMERCIAL

## 2024-11-07 VITALS — BODY MASS INDEX: 30.96 KG/M2 | WEIGHT: 164 LBS | HEIGHT: 61 IN

## 2024-11-07 DIAGNOSIS — S83.241A OTHER TEAR OF MEDIAL MENISCUS, CURRENT INJURY, RIGHT KNEE, INITIAL ENCOUNTER: ICD-10-CM

## 2024-11-07 DIAGNOSIS — M25.561 ACUTE PAIN OF RIGHT KNEE: Primary | ICD-10-CM

## 2024-11-07 PROCEDURE — 99213 OFFICE O/P EST LOW 20 MIN: CPT | Performed by: STUDENT IN AN ORGANIZED HEALTH CARE EDUCATION/TRAINING PROGRAM

## 2024-11-07 NOTE — PROGRESS NOTES
Ortho Sports Medicine Knee New Patient Visit     Assesment:   74 y.o. female right knee pain likely due possible meniscus tear, grade 1 strain of popliteus muscle and medial tibial marrow edema    Plan:    Ariel is present in office for follow up of her right knee. MRI was reviewed with patient and her  in depth. At this time discussed and recommend that we treat with conservative treatment. Discussed that it is too early to repeat corticosteroid injection and repeated injection can be given 12/12/24. Recommended to wear her short hinge knee brace with use of ace bandage underneath to prevent from sliding down on her leg and physical therapy to work on strengthening. At this time she would like to hold off on physical therapy and wear her brace for a while until next injection and revisit conversation about physical therapy at next appointment. Discussed that she can continue OTC pain medication as needed and can follow up as needed.     Conservative treatment:    Ice to knee for 20 minutes at least 1-2 times daily.  OTC NSAIDS prn for pain.  Tylenol for pain.    Imaging:    MRI was reviewed by myself and explained to the patient.       Injection:    No injection performed at today's visit    Surgery:     No surgery is recommended at this point, continue with conservative treatment plan as noted.      Follow up:    No follow-ups on file.        Chief Complaint   Patient presents with    Right Knee - Pain     Review MRI        History of Present Illness:    The patient is a 74 y.o. female whose occupation is retired,  seen in clinic for evaluation of right knee pain.  Patient states that she has been having posterior knee pain for the past couple of years that has since worsened in the past year. States that it is sharp with going down stairs, ambulating and has limited her activity levels. Has tried adjustments with chiropractor which allowed some relief but is not long lasting. Has not tried injection or  physical therapy. Denies any previous injury and denies any numbness or tingling. Has not had any locking, catching or the knee and knee feels stable.       Update 11/7/24:  She notes that the corticosteroid injection given at last visit provided her relief for a week until she had another twisting injury, she has 5/10 dull achy pain and sharp pain medially with ambulation and has instability. She has a short hinge knee brace at home but has not been wearing it. She has not had any physical therapy. Denies any new symptoms. Denies any numbness or tingling.     Knee Surgical History:  None    Past Medical, Social and Family History:  Past Medical History:   Diagnosis Date    Allergic 1987    Pneumovax and Roses    Anxiety     Asthma     Atypical chest pain 06/11/2020    Burping 7/17/2018    Cat bite of hand 06/29/2021    Chronic fatigue 9/5/2018    Disease of thyroid gland     GERD (gastroesophageal reflux disease)     Hearing loss     Heart murmur     Medicare annual wellness visit, subsequent 01/05/2021    Migraine     Peripheral vertigo     Plantar fasciitis 2018 [facility]    Had Cortesone injection and wore a walking boot  for 6ish weeks. Right foot.    Raynaud disease     S/P trigger finger release     Skin tag 2017 [facility]    PCP removed it.    Sleep apnea     Strain of left trapezius muscle 07/19/2019    Tinea corporis 1984    Was treated and had never returned.    Varicella 1955    Mild case as a young child    Visual disturbance      Past Surgical History:   Procedure Laterality Date    CATARACT EXTRACTION      COLON SURGERY      EYE SURGERY  2019    GLAUCOMA SURGERY      HALLUX VALGUS CORRECTION Right 06/19/2007    INCISION TENDON SHEATH HAND  05/23/2011    Release of A1 pulley    KNEE ARTHROSCOPY W/ MENISCAL REPAIR Left 2003    KNEE SURGERY  2005    [place] ESOPHAGOGASTRODUODENOSCOPY TRANSORAL DIAGNOSTIC N/A 08/22/2018    Procedure: ESOPHAGOGASTRODUODENOSCOPY (EGD);  Surgeon: Luis Mohan MD;  Location:  MAIN OR;   Service: Gastroenterology    IN TENDON SHEATH INCISION Left 07/11/2019    Procedure: RELEASE TRIGGER FINGER left ring;  Surgeon: Tim Mercado MD;  Location: [facility];  Service: Orthopedics    TOENAIL EXCISION  2005 [facility]    Left  great and left pinky toes.    TONSILLECTOMY  1953    TRIGGER FINGER RELEASE      TUBAL LIGATION  1977    UPPER GASTROINTESTINAL ENDOSCOPY       Allergies   Allergen Reactions    Pneumococcal Polysaccharide Vaccine Fever     Other reaction(s): Chills, fever and mottling  Category: Allergy;     Corrie Allergic Rhinitis, Headache, Nasal Congestion and Visual Disturbance    Timoptic [Timolol] Eye Swelling     Current Outpatient Medications on File Prior to Visit   Medication Sig Dispense Refill    acetaminophen (TYLENOL) 500 mg tablet Take 500 mg by mouth every 6 (six) hours as needed for mild pain 2 500mg BID      albuterol (Proventil HFA) 90 mcg/act inhaler Inhale 2 puffs every 6 (six) hours as needed for shortness of breath 6.7 g 0    amLODIPine (NORVASC) 5 mg tablet Take 1 tablet (5 mg total) by mouth daily 90 tablet 3    aspirin 81 mg chewable tablet Chew 1 tablet daily      cholecalciferol (VITAMIN D3) 1,000 units tablet Take 1,000 Units by mouth daily      citalopram (CeleXA) 40 mg tablet Take 1 tablet (40 mg total) by mouth daily 90 tablet 2    diphenoxylate-atropine (LOMOTIL) 2.5-0.025 mg per tablet Take 1 tablet by mouth daily as needed (as needed) 30 tablet 0    famotidine (PEPCID) 20 mg tablet Take 1 tablet (20 mg total) by mouth 2 (two) times a day 180 tablet 1    ibuprofen (MOTRIN) 200 mg tablet Take 200 mg by mouth every 6 (six) hours as needed for mild pain      Lactobacillus (Acidophilus) 100 MG CAPS Take by mouth      levothyroxine 25 mcg tablet Take 1 tablet (25 mcg total) by mouth daily 90 tablet 3    Multiple Vitamin (MULTIVITAMIN) tablet Take 1 tablet by mouth daily      pantoprazole (PROTONIX) 40 mg tablet TAKE 1 TABLET BY MOUTH DAILY 90 tablet 1    pravastatin  (PRAVACHOL) 10 mg tablet Take 1 tablet (10 mg total) by mouth daily at bedtime 90 tablet 3     No current facility-administered medications on file prior to visit.     Social History     Socioeconomic History    Marital status: /Civil Union     Spouse name: Not on file    Number of children: Not on file    Years of education: Not on file    Highest education level: Not on file   Occupational History    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)   Tobacco Use    Smoking status: Never    Smokeless tobacco: Never   Vaping Use    Vaping status: Never Used   Substance and Sexual Activity    Alcohol use: Yes     Alcohol/week: 3.0 standard drinks of alcohol     Types: 3 Cans of beer per week     Comment: Per week    Drug use: Never    Sexual activity: Not on file   Other Topics Concern    Not on file   Social History Narrative    Daily coffee consumption (1 cup/day)    Patient has living will    Uses safety equipment - seatbelts     Social Determinants of Health     Financial Resource Strain: Low Risk  (8/2/2023)    Overall Financial Resource Strain (CARDIA)     Difficulty of Paying Living Expenses: Not hard at all   Food Insecurity: No Food Insecurity (9/22/2024)    Nursing - Inadequate Food Risk Classification     Worried About Running Out of Food in the Last Year: Never true     Ran Out of Food in the Last Year: Never true     Ran Out of Food in the Last Year: Not on file   Transportation Needs: No Transportation Needs (9/22/2024)    PRAPARE - Transportation     Lack of Transportation (Medical): No     Lack of Transportation (Non-Medical): No   Physical Activity: Not on file   Stress: Not on file   Social Connections: Not on file   Intimate Partner Violence: Not on file   Housing Stability: Low Risk  (9/22/2024)    Housing Stability Vital Sign     Unable to Pay for Housing in the Last Year: No     Number of Times Moved in the Last Year: 0     Homeless in the Last  "Year: No         I have reviewed the past medical, surgical, social and family history, medications and allergies as documented in the EMR.    Review of systems: ROS is negative other than that noted in the HPI.  Constitutional: Negative for fatigue and fever.   HENT: Negative for sore throat.    Respiratory: Negative for shortness of breath.    Cardiovascular: Negative for chest pain.   Gastrointestinal: Negative for abdominal pain.   Endocrine: Negative for cold intolerance and heat intolerance.   Genitourinary: Negative for flank pain.   Musculoskeletal: Negative for back pain.   Skin: Negative for rash.   Allergic/Immunologic: Negative for immunocompromised state.   Neurological: Negative for dizziness.   Psychiatric/Behavioral: Negative for agitation.      Physical Exam:    Height 5' 1\" (1.549 m), weight 74.4 kg (164 lb).    General/Constitutional: NAD, well developed, well nourished  HENT: Normocephalic, atraumatic  CV: Intact distal pulses, regular rate  Resp: No respiratory distress or labored breathing  Lymphatic: No lymphadenopathy palpated  Neuro: Alert and Oriented x 3, no focal deficits  Psych: Normal mood, normal affect, normal judgement, normal behavior  Skin: Warm, dry, no rashes, no erythema      Knee Exam (focused):  Visual inspection of the right knee demonstrates normal contour without atrophy.   No previous incisions   There is no significant erythema or edema.    No significant joint effusion   Range of motion is full from 0-130 degrees of flexion   Able to straight leg raise   non tender to palpation  - medial joint line tenderness, - lateral joint line tenderness  - medial Gilbert's, - lateral Gilbert's  1A Lachman exam, stable posterior drawer  Stable to varus and valgus stress at both 0 and 30°  Patella tracks normally.  No J sign.  No apprehension.  Translation is approximately 2 quadrants and is equal to the contralateral side  Patellar eversion is similar to the contralateral " side    Examination of the patient's ipsilateral hip demonstrates full painless range of motion.  No crepitus.      LE NV Exam: +2 DP/PT pulses bilaterally  Sensation intact to light touch L2-S1 bilaterally     Bilateral hip ROM demonstrates no pain actively or passively    No calf tenderness to palpation bilaterally    Knee Imaging    MRI of the right knee were reviewed, which demonstrate mild tricompartmental osteoarthritis with complex medial meniscus tear at the posterior horn, has mild marrow edema at the medial tibial plateau indicating stress response and a grade 1 popliteus muscle strain with ruptured baker's cyst.  I do not currently have a radiology reading from [facility], but will check the result once the reading is performed.          Scribe Attestation      I,:  Divya Cosme PA-C am acting as a scribe while in the presence of the attending physician.:       I,:  Divya Cosme PA-C personally performed the services described in this documentation    as scribed in my presence.:

## 2024-12-12 ENCOUNTER — OFFICE VISIT (OUTPATIENT)
Dept: OBGYN CLINIC | Facility: CLINIC | Age: 74
End: 2024-12-12
Payer: COMMERCIAL

## 2024-12-12 VITALS — WEIGHT: 164 LBS | BODY MASS INDEX: 30.96 KG/M2 | HEIGHT: 61 IN

## 2024-12-12 DIAGNOSIS — M25.561 RIGHT KNEE PAIN, UNSPECIFIED CHRONICITY: ICD-10-CM

## 2024-12-12 DIAGNOSIS — S83.241A OTHER TEAR OF MEDIAL MENISCUS, CURRENT INJURY, RIGHT KNEE, INITIAL ENCOUNTER: Primary | ICD-10-CM

## 2024-12-12 PROCEDURE — 20610 DRAIN/INJ JOINT/BURSA W/O US: CPT

## 2024-12-12 PROCEDURE — 99213 OFFICE O/P EST LOW 20 MIN: CPT | Performed by: STUDENT IN AN ORGANIZED HEALTH CARE EDUCATION/TRAINING PROGRAM

## 2024-12-12 RX ORDER — TRIAMCINOLONE ACETONIDE 40 MG/ML
40 INJECTION, SUSPENSION INTRA-ARTICULAR; INTRAMUSCULAR
Status: COMPLETED | OUTPATIENT
Start: 2024-12-12 | End: 2024-12-12

## 2024-12-12 RX ORDER — BUPIVACAINE HYDROCHLORIDE 2.5 MG/ML
4 INJECTION, SOLUTION INFILTRATION; PERINEURAL
Status: COMPLETED | OUTPATIENT
Start: 2024-12-12 | End: 2024-12-12

## 2024-12-12 RX ADMIN — TRIAMCINOLONE ACETONIDE 40 MG: 40 INJECTION, SUSPENSION INTRA-ARTICULAR; INTRAMUSCULAR at 14:15

## 2024-12-12 RX ADMIN — BUPIVACAINE HYDROCHLORIDE 4 ML: 2.5 INJECTION, SOLUTION INFILTRATION; PERINEURAL at 14:15

## 2024-12-12 NOTE — PROGRESS NOTES
Ortho Sports Medicine Knee New Patient Visit     Assesment:   74 y.o. female right knee pain likely due possible meniscus tear, grade 1 strain of popliteus muscle and medial tibial marrow edema    Plan:    Ariel is present in office for follow up of her right knee. At last visit she was given a right knee corticosteroid injection which provided her months of relief. Due to return of right knee pain recommended repeat corticosteroid injection which was given without complication and was well tolerated. Discussed that she can continue use of ice and OTC pain medications as needed for discomfort. Patient made aware that injection can be given every 3 months and is to follow up as needed.    Conservative treatment:    Ice to knee for 20 minutes at least 1-2 times daily.  OTC NSAIDS prn for pain.  Tylenol for pain.    Imaging:    none      Injection:    Right knee CSI given without complication and was well tolerated    Surgery:     No surgery is recommended at this point, continue with conservative treatment plan as noted.      Follow up:    Return if symptoms worsen or fail to improve.        No chief complaint on file.      History of Present Illness:    The patient is a 74 y.o. female whose occupation is retired,  seen in clinic for evaluation of right knee pain.  Patient states that she has been having posterior knee pain for the past couple of years that has since worsened in the past year. States that it is sharp with going down stairs, ambulating and has limited her activity levels. Has tried adjustments with chiropractor which allowed some relief but is not long lasting. Has not tried injection or physical therapy. Denies any previous injury and denies any numbness or tingling. Has not had any locking, catching or the knee and knee feels stable.       Update 12/12/24:  She notes that the corticosteroid injection given at last visit provided her relief for a several weeks, her pain has since returned and pain  is rated to be a 4/10 dull achiness today. She denies any new injuries and denies any numbness or tingling.     Knee Surgical History:  None    Past Medical, Social and Family History:  Past Medical History:   Diagnosis Date    Allergic 1987    Pneumovax and Roses    Anxiety     Asthma     Atypical chest pain 06/11/2020    Burping 7/17/2018    Cat bite of hand 06/29/2021    Chronic fatigue 9/5/2018    Disease of thyroid gland     GERD (gastroesophageal reflux disease)     Hearing loss     Heart murmur     Medicare annual wellness visit, subsequent 01/05/2021    Migraine     Peripheral vertigo     Plantar fasciitis 2018 [facility]    Had Cortesone injection and wore a walking boot  for 6ish weeks. Right foot.    Raynaud disease     S/P trigger finger release     Skin tag 2017 [facility]    PCP removed it.    Sleep apnea     Strain of left trapezius muscle 07/19/2019    Tinea corporis 1984    Was treated and had never returned.    Varicella 1955    Mild case as a young child    Visual disturbance      Past Surgical History:   Procedure Laterality Date    CATARACT EXTRACTION      COLON SURGERY      EYE SURGERY  2019    GLAUCOMA SURGERY      HALLUX VALGUS CORRECTION Right 06/19/2007    INCISION TENDON SHEATH HAND  05/23/2011    Release of A1 pulley    KNEE ARTHROSCOPY W/ MENISCAL REPAIR Left 2003    KNEE SURGERY  2005    [place] ESOPHAGOGASTRODUODENOSCOPY TRANSORAL DIAGNOSTIC N/A 08/22/2018    Procedure: ESOPHAGOGASTRODUODENOSCOPY (EGD);  Surgeon: Luis Mohan MD;  Location:  MAIN OR;  Service: Gastroenterology    [place] TENDON SHEATH INCISION Left 07/11/2019    Procedure: RELEASE TRIGGER FINGER left ring;  Surgeon: Tim Mercado MD;  Location:  MAIN OR;  Service: Orthopedics    TOENAIL EXCISION  2005 [facility]    Left  great and left pinky toes.    TONSILLECTOMY  1953    TRIGGER FINGER RELEASE      TUBAL LIGATION  1977    UPPER GASTROINTESTINAL ENDOSCOPY       Allergies   Allergen Reactions    Pneumococcal Polysaccharide Vaccine Fever      Other reaction(s): Chills, fever and mottling  Category: Allergy;     Corrie Allergic Rhinitis, Headache, Nasal Congestion and Visual Disturbance    Timoptic [Timolol] Eye Swelling     Current Outpatient Medications on File Prior to Visit   Medication Sig Dispense Refill    acetaminophen (TYLENOL) 500 mg tablet Take 500 mg by mouth every 6 (six) hours as needed for mild pain 2 500mg BID      albuterol (Proventil HFA) 90 mcg/act inhaler Inhale 2 puffs every 6 (six) hours as needed for shortness of breath 6.7 g 0    amLODIPine (NORVASC) 5 mg tablet Take 1 tablet (5 mg total) by mouth daily 90 tablet 3    aspirin 81 mg chewable tablet Chew 1 tablet daily      cholecalciferol (VITAMIN D3) 1,000 units tablet Take 1,000 Units by mouth daily      citalopram (CeleXA) 40 mg tablet Take 1 tablet (40 mg total) by mouth daily 90 tablet 2    diphenoxylate-atropine (LOMOTIL) 2.5-0.025 mg per tablet Take 1 tablet by mouth daily as needed (as needed) 30 tablet 0    famotidine (PEPCID) 20 mg tablet Take 1 tablet (20 mg total) by mouth 2 (two) times a day 180 tablet 1    ibuprofen (MOTRIN) 200 mg tablet Take 200 mg by mouth every 6 (six) hours as needed for mild pain      Lactobacillus (Acidophilus) 100 MG CAPS Take by mouth      levothyroxine 25 mcg tablet Take 1 tablet (25 mcg total) by mouth daily 90 tablet 3    Multiple Vitamin (MULTIVITAMIN) tablet Take 1 tablet by mouth daily      pantoprazole (PROTONIX) 40 mg tablet TAKE 1 TABLET BY MOUTH DAILY 90 tablet 1    pravastatin (PRAVACHOL) 10 mg tablet Take 1 tablet (10 mg total) by mouth daily at bedtime 90 tablet 3     No current facility-administered medications on file prior to visit.     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)   Tobacco Use    Smoking status: Never    Smokeless tobacco: Never   Vaping Use    Vaping status: Never Used   Substance and Sexual Activity    Alcohol use: Yes     Alcohol/week: 3.0 standard drinks of alcohol     Types: 3 Cans of beer per week     Comment: Per week    Drug use: Never    Sexual activity: Not on file   Other Topics Concern    Not on file   Social History Narrative    Daily coffee consumption (1 cup/day)    Patient has living will    Uses safety equipment - seatbelts     Social Drivers of Health     Financial Resource Strain: Low Risk  (8/2/2023)    Overall Financial Resource Strain (CARDIA)     Difficulty of Paying Living Expenses: Not hard at all   Food Insecurity: No Food Insecurity (9/22/2024)    Nursing - Inadequate Food Risk Classification     Worried About Running Out of Food in the Last Year: Never true     Ran Out of Food in the Last Year: Never true     Ran Out of Food in the Last Year: Not on file   Transportation Needs: No Transportation Needs (9/22/2024)    PRAPARE - Transportation     Lack of Transportation (Medical): No     Lack of Transportation (Non-Medical): No   Physical Activity: Not on file   Stress: Not on file   Social Connections: Not on file   Intimate Partner Violence: Not on file   Housing Stability: Low Risk  (9/22/2024)    Housing Stability Vital Sign     Unable to Pay for Housing in the Last Year: No     Number of Times Moved in the Last Year: 0     Homeless in the Last Year: No         I have reviewed the past medical, surgical, social and family history, medications and allergies as documented in the EMR.    Review of systems: ROS is negative other than that noted in the HPI.  Constitutional: Negative for fatigue and fever.   HENT: Negative for sore throat.    Respiratory: Negative for shortness of breath.    Cardiovascular: Negative for chest pain.   Gastrointestinal: Negative for abdominal pain.   Endocrine: Negative for cold  "intolerance and heat intolerance.   Genitourinary: Negative for flank pain.   Musculoskeletal: Negative for back pain.   Skin: Negative for rash.   Allergic/Immunologic: Negative for immunocompromised state.   Neurological: Negative for dizziness.   Psychiatric/Behavioral: Negative for agitation.      Physical Exam:    Height 5' 1\" (1.549 m), weight 74.4 kg (164 lb).    General/Constitutional: NAD, well developed, well nourished  HENT: Normocephalic, atraumatic  CV: Intact distal pulses, regular rate  Resp: No respiratory distress or labored breathing  Lymphatic: No lymphadenopathy palpated  Neuro: Alert and Oriented x 3, no focal deficits  Psych: Normal mood, normal affect, normal judgement, normal behavior  Skin: Warm, dry, no rashes, no erythema      Knee Exam (focused):  Visual inspection of the right knee demonstrates normal contour without atrophy.   No previous incisions   There is no significant erythema or edema.    No significant joint effusion   Range of motion is full from 0-130 degrees of flexion   Able to straight leg raise   non tender to palpation  - medial joint line tenderness, - lateral joint line tenderness  - medial Gilbert's, - lateral Gilbert's  1A Lachman exam, stable posterior drawer  Stable to varus and valgus stress at both 0 and 30°  Patella tracks normally.  No J sign.  No apprehension.  Translation is approximately 2 quadrants and is equal to the contralateral side  Patellar eversion is similar to the contralateral side    Examination of the patient's ipsilateral hip demonstrates full painless range of motion.  No crepitus.      LE NV Exam: +2 DP/PT pulses bilaterally  Sensation intact to light touch L2-S1 bilaterally     Bilateral hip ROM demonstrates no pain actively or passively    No calf tenderness to palpation bilaterally    Large joint arthrocentesis: R knee  Universal Protocol:  Consent: Verbal consent obtained. Written consent not obtained.  Risks and benefits: risks, " "benefits and alternatives were discussed  Consent given by: patient  Time out: Immediately prior to procedure a \"time out\" was called to verify the correct patient, procedure, equipment, support staff and site/side marked as required.  Timeout called at: 12/12/2024 3:07 PM.  Patient understanding: patient states understanding of the procedure being performed  Relevant documents: relevant documents present and verified  Test results: test results available and properly labeled  Site marked: the operative site was marked  Radiology Images displayed and confirmed. If images not available, report reviewed: imaging studies available  Patient identity confirmed: verbally with patient, provided demographic data and hospital-assigned identification number  Supporting Documentation  Indications: pain and joint swelling   Procedure Details  Location: knee - R knee  Preparation: Patient was prepped and draped in the usual sterile fashion  Needle size: 18 G  Ultrasound guidance: no  Approach: anterolateral  Medications administered: 4 mL bupivacaine 0.25 %; 40 mg triamcinolone acetonide 40 mg/mL    Patient tolerance: patient tolerated the procedure well with no immediate complications  Dressing:  Sterile dressing applied               Scribe Attestation      I,:  Divya Cosme PA-C am acting as a scribe while in the presence of the attending physician.:       I,:  Divya Cosme PA-C personally performed the services described in this documentation    as scribed in my presence.:            "

## 2024-12-18 DIAGNOSIS — E78.5 HYPERLIPIDEMIA, UNSPECIFIED HYPERLIPIDEMIA TYPE: ICD-10-CM

## 2024-12-19 RX ORDER — PRAVASTATIN SODIUM 10 MG
10 TABLET ORAL
Qty: 90 TABLET | Refills: 1 | Status: SHIPPED | OUTPATIENT
Start: 2024-12-19

## 2024-12-27 DIAGNOSIS — E03.9 ACQUIRED HYPOTHYROIDISM: ICD-10-CM

## 2024-12-27 RX ORDER — LEVOTHYROXINE SODIUM 25 UG/1
25 TABLET ORAL DAILY
Qty: 90 TABLET | Refills: 1 | Status: SHIPPED | OUTPATIENT
Start: 2024-12-27

## 2025-01-09 DIAGNOSIS — I10 ESSENTIAL HYPERTENSION: ICD-10-CM

## 2025-01-10 RX ORDER — AMLODIPINE BESYLATE 5 MG/1
5 TABLET ORAL DAILY
Qty: 90 TABLET | Refills: 1 | Status: SHIPPED | OUTPATIENT
Start: 2025-01-10

## 2025-02-07 ENCOUNTER — TELEPHONE (OUTPATIENT)
Age: 75
End: 2025-02-07

## 2025-02-07 DIAGNOSIS — J30.89 NON-SEASONAL ALLERGIC RHINITIS, UNSPECIFIED TRIGGER: Primary | ICD-10-CM

## 2025-02-07 DIAGNOSIS — Z20.828 EXPOSURE TO INFLUENZA: Primary | ICD-10-CM

## 2025-02-07 RX ORDER — FLUTICASONE PROPIONATE 50 MCG
2 SPRAY, SUSPENSION (ML) NASAL DAILY
Qty: 9.9 ML | Refills: 5 | Status: SHIPPED | OUTPATIENT
Start: 2025-02-07

## 2025-02-07 RX ORDER — OSELTAMIVIR PHOSPHATE 75 MG/1
75 CAPSULE ORAL DAILY
Qty: 10 CAPSULE | Refills: 0 | Status: SHIPPED | OUTPATIENT
Start: 2025-02-07 | End: 2025-02-17

## 2025-02-07 NOTE — TELEPHONE ENCOUNTER
Patient was around her son who is Flu positive. Patient is wondering if Flonase can be prescribed for her because she has stuffy nose with difficulty breathing she can feel it on her sinuses.  Please send script to [facility] in [place].  Please advise accordingly. Patient is aware that she might need to make an appointment.    Thank you!!

## 2025-02-17 ENCOUNTER — TELEPHONE (OUTPATIENT)
Dept: OBGYN CLINIC | Facility: HOSPITAL | Age: 75
End: 2025-02-17

## 2025-02-17 NOTE — TELEPHONE ENCOUNTER
She could see myself in the office if she would like.  We are scheduling out to the middle of April I believe at this time.

## 2025-02-17 NOTE — TELEPHONE ENCOUNTER
Caller: Fiorella (Patient)    Doctor: Dr. Mercado    Reason for call: Patient has not seen Dr. Mercado since 2023, but before schedule a follow up to discuss trigger finger surgery, she would like to know how far he is scheduling out. She said last time she had to wait months.  Looks like she had a surgery cancelled in 2023.    She would like a call.     Call back#: [phone]

## 2025-02-18 NOTE — TELEPHONE ENCOUNTER
Spoke with patient, she was actually wondering for her . She is going to discuss with him & call us back to set up an apt.

## 2025-03-15 DIAGNOSIS — K21.9 GASTROESOPHAGEAL REFLUX DISEASE WITHOUT ESOPHAGITIS: ICD-10-CM

## 2025-03-15 RX ORDER — PANTOPRAZOLE SODIUM 40 MG/1
40 TABLET, DELAYED RELEASE ORAL DAILY
Qty: 90 TABLET | Refills: 3 | Status: SHIPPED | OUTPATIENT
Start: 2025-03-15

## 2025-04-28 NOTE — TELEPHONE ENCOUNTER
GYN   I printed out a rx up front for the wrist splint   We can fax to medical supply or we can mail or she can  (0) lying quietly, normal position, moves easily/(1) reassured by occasional touch, hug or being talked to/(1) moans or whimpers; occasional complaint/(0) no particular expression or smile/(0) normal position or relaxed

## 2025-05-04 DIAGNOSIS — E03.9 ACQUIRED HYPOTHYROIDISM: ICD-10-CM

## 2025-05-05 RX ORDER — LEVOTHYROXINE SODIUM 25 UG/1
25 TABLET ORAL DAILY
Qty: 90 TABLET | Refills: 1 | Status: SHIPPED | OUTPATIENT
Start: 2025-05-05

## 2025-05-13 DIAGNOSIS — J03.90 LINGUAL TONSILLITIS: ICD-10-CM

## 2025-05-13 DIAGNOSIS — K21.9 LARYNGOPHARYNGEAL REFLUX (LPR): ICD-10-CM

## 2025-05-13 RX ORDER — FAMOTIDINE 20 MG/1
20 TABLET, FILM COATED ORAL 2 TIMES DAILY
Qty: 180 TABLET | Refills: 1 | Status: SHIPPED | OUTPATIENT
Start: 2025-05-13 | End: 2025-11-09

## 2025-06-02 DIAGNOSIS — F41.9 ANXIETY: ICD-10-CM

## 2025-06-02 DIAGNOSIS — E78.5 HYPERLIPIDEMIA, UNSPECIFIED HYPERLIPIDEMIA TYPE: ICD-10-CM

## 2025-06-03 RX ORDER — CITALOPRAM HYDROBROMIDE 40 MG/1
40 TABLET ORAL DAILY
Qty: 90 TABLET | Refills: 0 | Status: SHIPPED | OUTPATIENT
Start: 2025-06-03

## 2025-06-03 RX ORDER — PRAVASTATIN SODIUM 10 MG
10 TABLET ORAL
Qty: 90 TABLET | Refills: 0 | Status: SHIPPED | OUTPATIENT
Start: 2025-06-03

## 2025-06-23 ENCOUNTER — HOSPITAL ENCOUNTER (OUTPATIENT)
Dept: MAMMOGRAPHY | Facility: CLINIC | Age: 75
Discharge: HOME | End: 2025-06-23
Payer: COMMERCIAL

## 2025-06-23 VITALS — HEIGHT: 61 IN | BODY MASS INDEX: 30.96 KG/M2 | WEIGHT: 164 LBS

## 2025-06-23 DIAGNOSIS — Z12.31 ENCOUNTER FOR SCREENING MAMMOGRAM FOR MALIGNANT NEOPLASM OF BREAST: ICD-10-CM

## 2025-06-23 PROCEDURE — 77063 BREAST TOMOSYNTHESIS BI: CPT

## 2025-06-23 PROCEDURE — 77067 SCR MAMMO BI INCL CAD: CPT

## 2025-06-25 ENCOUNTER — OFFICE VISIT (OUTPATIENT)
Dept: FAMILY MEDICINE CLINIC | Facility: CLINIC | Age: 75
End: 2025-06-25
Payer: COMMERCIAL

## 2025-06-25 VITALS
DIASTOLIC BLOOD PRESSURE: 58 MMHG | HEART RATE: 87 BPM | SYSTOLIC BLOOD PRESSURE: 128 MMHG | BODY MASS INDEX: 30.5 KG/M2 | OXYGEN SATURATION: 95 % | WEIGHT: 161.4 LBS | TEMPERATURE: 97.4 F

## 2025-06-25 DIAGNOSIS — F41.9 ANXIETY: ICD-10-CM

## 2025-06-25 DIAGNOSIS — R35.0 URINARY FREQUENCY: Primary | ICD-10-CM

## 2025-06-25 PROBLEM — M65.321 TRIGGER FINGER, RIGHT INDEX FINGER: Status: RESOLVED | Noted: 2022-02-01 | Resolved: 2025-06-25

## 2025-06-25 PROBLEM — M65.342 TRIGGER FINGER, LEFT RING FINGER: Status: RESOLVED | Noted: 2019-06-03 | Resolved: 2025-06-25

## 2025-06-25 PROBLEM — M79.661 RIGHT CALF PAIN: Status: RESOLVED | Noted: 2024-09-29 | Resolved: 2025-06-25

## 2025-06-25 LAB
SL AMB  POCT GLUCOSE, UA: NEGATIVE
SL AMB LEUKOCYTE ESTERASE,UA: ABNORMAL
SL AMB POCT BILIRUBIN,UA: NEGATIVE
SL AMB POCT BLOOD,UA: NEGATIVE
SL AMB POCT CLARITY,UA: CLEAR
SL AMB POCT COLOR,UA: ABNORMAL
SL AMB POCT KETONES,UA: NEGATIVE
SL AMB POCT NITRITE,UA: NEGATIVE
SL AMB POCT PH,UA: 6.5
SL AMB POCT SPECIFIC GRAVITY,UA: 1.01
SL AMB POCT URINE PROTEIN: NEGATIVE
SL AMB POCT UROBILINOGEN: ABNORMAL

## 2025-06-25 PROCEDURE — 81002 URINALYSIS NONAUTO W/O SCOPE: CPT | Performed by: FAMILY MEDICINE

## 2025-06-25 PROCEDURE — 99213 OFFICE O/P EST LOW 20 MIN: CPT | Performed by: FAMILY MEDICINE

## 2025-06-25 RX ORDER — ALPRAZOLAM 0.5 MG
TABLET ORAL
Qty: 20 TABLET | Refills: 0 | Status: CANCELLED | OUTPATIENT
Start: 2025-06-25

## 2025-06-25 RX ORDER — ALPRAZOLAM 0.5 MG
0.5 TABLET ORAL AS NEEDED
COMMUNITY
End: 2025-06-27 | Stop reason: SDUPTHER

## 2025-06-25 RX ORDER — OMEPRAZOLE 20 MG/1
20 CAPSULE, DELAYED RELEASE ORAL DAILY
COMMUNITY

## 2025-06-25 RX ORDER — SULFAMETHOXAZOLE AND TRIMETHOPRIM 800; 160 MG/1; MG/1
1 TABLET ORAL EVERY 12 HOURS SCHEDULED
Qty: 14 TABLET | Refills: 0 | Status: CANCELLED | OUTPATIENT
Start: 2025-06-25 | End: 2025-07-02

## 2025-06-25 NOTE — PROGRESS NOTES
Name: Fiorella Ashraf      : 1950      MRN: [id number]  Encounter Provider: Carmen Ortiz DO  Encounter Date: 2025   Encounter department: [facility]  :  Assessment & Plan  Urinary frequency  Urine shows signs of infection. Start bactrim 1 tab twice a day, urine sent for culture. Increase fluid intake   Call if fever, increased symptoms, blood in urine.   Orders:    POCT urine dip    Urine culture    Anxiety  Stable on citalopram. Ok for alprazolam as needed.               History of Present Illness   Pt feels a fullness over her bladder. Discomfort over bladder area for the past 3 days. No dysuria. No blood in urine  No history of kidney stones. Incomplete emptying of bladder . No fever.     Urinary Tract Infection   Pertinent negatives include no flank pain, frequency, hematuria or urgency.     Review of Systems   Constitutional: Negative.  Negative for fatigue and fever.   HENT: Negative.     Eyes: Negative.    Respiratory: Negative.  Negative for cough.    Cardiovascular: Negative.    Gastrointestinal: Negative.    Endocrine: Negative.    Genitourinary:  Positive for pelvic pain. Negative for dysuria, enuresis, flank pain, frequency, hematuria and urgency.   Musculoskeletal:  Positive for back pain.   Skin: Negative.    Allergic/Immunologic: Negative.    Neurological: Negative.    Psychiatric/Behavioral: Negative.         Objective   /58 (BP Location: Left arm, Patient Position: Sitting, Cuff Size: Standard)   Pulse 87   Temp (!) 97.4 °F (36.3 °C) (Tympanic)   Wt 73.2 kg (161 lb 6.4 oz)   SpO2 95%   BMI 30.50 kg/m²      Physical Exam  Vitals and nursing note reviewed.   Constitutional:       Appearance: She is well-developed.   HENT:      Head: Normocephalic and atraumatic.     Cardiovascular:      Rate and Rhythm: Normal rate and regular rhythm.      Heart sounds: Normal heart sounds.   Pulmonary:      Effort: Pulmonary effort is normal.      Breath sounds: Normal  breath sounds.   Abdominal:      General: Bowel sounds are normal.      Palpations: Abdomen is soft.     Musculoskeletal:         General: Tenderness present. No signs of injury.      Comments: Pressure over suprapubic area     Skin:     General: Skin is warm and dry.     Neurological:      Mental Status: She is alert and oriented to person, place, and time.     Psychiatric:         Behavior: Behavior normal.         Thought Content: Thought content normal.         Judgment: Judgment normal.

## 2025-06-25 NOTE — ASSESSMENT & PLAN NOTE
Urine shows signs of infection. Start bactrim 1 tab twice a day, urine sent for culture. Increase fluid intake   Call if fever, increased symptoms, blood in urine.   Orders:    POCT urine dip    Urine culture

## 2025-06-27 ENCOUNTER — TELEPHONE (OUTPATIENT)
Age: 75
End: 2025-06-27

## 2025-06-27 DIAGNOSIS — F41.9 ANXIETY: Primary | ICD-10-CM

## 2025-06-27 DIAGNOSIS — R39.9 SYMPTOMS OF URINARY TRACT INFECTION: ICD-10-CM

## 2025-06-27 LAB
BACTERIA UR CULT: NORMAL
Lab: NO GROWTH

## 2025-06-27 RX ORDER — SULFAMETHOXAZOLE AND TRIMETHOPRIM 800; 160 MG/1; MG/1
1 TABLET ORAL EVERY 12 HOURS SCHEDULED
Qty: 14 TABLET | Refills: 0 | Status: SHIPPED | OUTPATIENT
Start: 2025-06-27 | End: 2025-07-04

## 2025-06-27 RX ORDER — ALPRAZOLAM 0.5 MG
0.5 TABLET ORAL 3 TIMES DAILY PRN
Qty: 60 TABLET | Refills: 0 | Status: SHIPPED | OUTPATIENT
Start: 2025-06-27

## 2025-06-27 NOTE — TELEPHONE ENCOUNTER
Pt calling in because she is at [facility] right now and Dr. Ortiz was supposed to send in Bactrim and Xanax and they haven't received anything; pt was seen on 6/25.    Please advise and have PCP send in ASAP.

## 2025-06-29 ENCOUNTER — RESULTS FOLLOW-UP (OUTPATIENT)
Dept: FAMILY MEDICINE CLINIC | Facility: CLINIC | Age: 75
End: 2025-06-29

## 2025-07-01 NOTE — TELEPHONE ENCOUNTER
----- Message from Carmen Ortiz DO sent at 6/29/2025  9:55 PM EDT -----  Please let the pt known that her mammogram was normal.  Repeat in 1 year.   ----- Message -----  From: Naina Hopkins DO  Sent: 6/27/2025  10:11 AM EDT  To: Carmen Ortiz DO

## 2025-07-10 ENCOUNTER — OFFICE VISIT (OUTPATIENT)
Dept: OBGYN CLINIC | Facility: CLINIC | Age: 75
End: 2025-07-10
Payer: COMMERCIAL

## 2025-07-10 VITALS — BODY MASS INDEX: 30.4 KG/M2 | WEIGHT: 161 LBS | HEIGHT: 61 IN

## 2025-07-10 DIAGNOSIS — S83.241A OTHER TEAR OF MEDIAL MENISCUS, CURRENT INJURY, RIGHT KNEE, INITIAL ENCOUNTER: Primary | ICD-10-CM

## 2025-07-10 PROCEDURE — 99213 OFFICE O/P EST LOW 20 MIN: CPT | Performed by: STUDENT IN AN ORGANIZED HEALTH CARE EDUCATION/TRAINING PROGRAM

## 2025-07-10 PROCEDURE — 20610 DRAIN/INJ JOINT/BURSA W/O US: CPT

## 2025-07-10 RX ORDER — TRIAMCINOLONE ACETONIDE 40 MG/ML
40 INJECTION, SUSPENSION INTRA-ARTICULAR; INTRAMUSCULAR
Status: COMPLETED | OUTPATIENT
Start: 2025-07-10 | End: 2025-07-10

## 2025-07-10 RX ORDER — BUPIVACAINE HYDROCHLORIDE 2.5 MG/ML
4 INJECTION, SOLUTION INFILTRATION; PERINEURAL
Status: COMPLETED | OUTPATIENT
Start: 2025-07-10 | End: 2025-07-10

## 2025-07-10 RX ADMIN — BUPIVACAINE HYDROCHLORIDE 4 ML: 2.5 INJECTION, SOLUTION INFILTRATION; PERINEURAL at 13:00

## 2025-07-10 RX ADMIN — TRIAMCINOLONE ACETONIDE 40 MG: 40 INJECTION, SUSPENSION INTRA-ARTICULAR; INTRAMUSCULAR at 13:00

## 2025-07-10 NOTE — ASSESSMENT & PLAN NOTE
Discussed and recommended short hinged knee brace for stability as she is caring for her  at this time  Recommended repeat corticosteroid injection which was provided without complication and was well tolerated  OTC pain medication   Discussed that injections can be repeated every 3 months  Visco gel injections were discussed including risks and benefits  Discussed that if corticosteroid injection do not allow much releif she should call and will submit prior auth for Durolane  Follow up as needed

## 2025-07-10 NOTE — PROGRESS NOTES
"[facility]  [facility] [place]  [address]  [facility] [address]  [place] [address]  [phone]       Fiorella Ashraf  [id number]  1950    ORTHOPAEDIC SURGERY OUTPATIENT NOTE  7/10/2025      :  Assessment & Plan  Other tear of medial meniscus, current injury, right knee, initial encounter    Discussed and recommended short hinged knee brace for stability as she is caring for her  at this time  Recommended repeat corticosteroid injection which was provided without complication and was well tolerated  OTC pain medication   Discussed that injections can be repeated every 3 months  Visco gel injections were discussed including risks and benefits  Discussed that if corticosteroid injection do not allow much releif she should call and will submit prior auth for Durolane  Follow up as needed          Large joint arthrocentesis: R knee    Performed by: Divya Cosme PA-C  Authorized by: Joss Tripp DO    Universal Protocol:  Consent: Verbal consent obtained. Written consent not obtained  Risks and benefits: risks, benefits and alternatives were discussed  Consent given by: patient  Time out: Immediately prior to procedure a \"time out\" was called to verify the correct patient, procedure, equipment, support staff and site/side marked as required.  Timeout called at: 7/10/2025 1:50 PM.  Patient understanding: patient states understanding of the procedure being performed  Relevant documents: relevant documents present and verified  Test results: test results available and properly labeled  Site marked: the operative site was marked  Radiology Images displayed and confirmed. If images not available, report reviewed: imaging studies available  Patient identity confirmed: verbally with patient, provided demographic data and hospital-assigned identification number  Supporting Documentation  Indications: pain and joint swelling     Is this a Visco injection? NoProcedure " "Details  Location: knee - R knee  Preparation: Patient was prepped and draped in the usual sterile fashion  Needle size: 18 G  Ultrasound guidance: no  Approach: anterolateral  Medications administered: 4 mL bupivacaine 0.25 %; 40 mg triamcinolone acetonide 40 mg/mL    Patient tolerance: patient tolerated the procedure well with no immediate complications  Dressing:  Sterile dressing applied             Translation: No    HISTORY:  74 y.o. female who is present in office for follow up right knee pain, notes that the last injection allowed her significant relief. She notes no new injuries and has been having some buckling, likely 4 times a day with ambulation. She notes that her knee pain today is 3/10 dull achy diffuse knee pain. She is inquiring about repeat injection    Surgical History:  None    Previous Injection(s): none      The following portions of the patient's history were reviewed and updated as appropriate: allergies, current medications, past family history, past social history, past surgical history and problem list.    Ht 5' 1\" (1.549 m)   Wt 73 kg (161 lb)   BMI 30.42 kg/m²    Lab Results   Component Value Date    HGBA1C 6.2 (H) 09/19/2024         Past Medical History[1]    Past Surgical History[2]    Social History     Socioeconomic History    Marital status: /Civil Union     Spouse name: Not on file    Number of children: Not on file    Years of education: Not on file    Highest education level: Not on file   Occupational History    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)   Tobacco Use    Smoking status: Never    Smokeless tobacco: Never   Vaping Use    Vaping status: Never Used   Substance and Sexual Activity    Alcohol use: Yes     Alcohol/week: 3.0 standard drinks of alcohol     Types: 3 Cans of beer per week     Comment: Per week    Drug use: Never    Sexual activity: Not on file   Other Topics Concern    Not on file   Social History " Narrative    Daily coffee consumption (1 cup/day)    Patient has living will    Uses safety equipment - seatbelts     Social Drivers of Health     Financial Resource Strain: Low Risk  (8/2/2023)    Overall Financial Resource Strain (CARDIA)     Difficulty of Paying Living Expenses: Not hard at all   Food Insecurity: No Food Insecurity (9/22/2024)    Nursing - Inadequate Food Risk Classification     Worried About Running Out of Food in the Last Year: Never true     Ran Out of Food in the Last Year: Never true     Ran Out of Food in the Last Year: Not on file   Transportation Needs: No Transportation Needs (9/22/2024)    PRAPARE - Transportation     Lack of Transportation (Medical): No     Lack of Transportation (Non-Medical): No   Physical Activity: Not on file   Stress: Not on file   Social Connections: Not on file   Intimate Partner Violence: Not on file   Housing Stability: Low Risk  (9/22/2024)    Housing Stability Vital Sign     Unable to Pay for Housing in the Last Year: No     Number of Times Moved in the Last Year: 0     Homeless in the Last Year: No       Family History[3]     Patient's Medications   New Prescriptions    No medications on file   Previous Medications    ACETAMINOPHEN (TYLENOL) 500 MG TABLET    Take 500 mg by mouth every 6 (six) hours as needed for mild pain 2 500mg BID    ALBUTEROL (PROVENTIL HFA) 90 MCG/ACT INHALER    Inhale 2 puffs every 6 (six) hours as needed for shortness of breath    ALPRAZOLAM (XANAX) 0.5 MG TABLET    Take 1 tablet (0.5 mg total) by mouth 3 (three) times a day as needed for anxiety    AMLODIPINE (NORVASC) 5 MG TABLET    TAKE 1 TABLET BY MOUTH DAILY    ASPIRIN 81 MG CHEWABLE TABLET    Chew 1 tablet in the morning.    CHOLECALCIFEROL (VITAMIN D3) 1,000 UNITS TABLET    Take 1,000 Units by mouth in the morning.    CITALOPRAM (CELEXA) 40 MG TABLET    TAKE 1 TABLET BY MOUTH DAILY    DIPHENOXYLATE-ATROPINE (LOMOTIL) 2.5-0.025 MG PER TABLET    Take 1 tablet by mouth daily as  needed (as needed)    FAMOTIDINE (PEPCID) 20 MG TABLET    Take 1 tablet (20 mg total) by mouth 2 (two) times a day    FLUTICASONE (FLONASE) 50 MCG/ACT NASAL SPRAY    2 sprays into each nostril daily    IBUPROFEN (MOTRIN) 200 MG TABLET    Take 200 mg by mouth every 6 (six) hours as needed for mild pain    LACTOBACILLUS (ACIDOPHILUS) 100 MG CAPS    Take by mouth    LEVOTHYROXINE 25 MCG TABLET    Take 1 tablet (25 mcg total) by mouth daily    MULTIPLE VITAMIN (MULTIVITAMIN) TABLET    Take 1 tablet by mouth in the morning.    OMEPRAZOLE (PRILOSEC) 20 MG DELAYED RELEASE CAPSULE    Take 20 mg by mouth daily    PANTOPRAZOLE (PROTONIX) 40 MG TABLET    TAKE 1 TABLET BY MOUTH DAILY    PRAVASTATIN (PRAVACHOL) 10 MG TABLET    TAKE 1 TABLET BY MOUTH DAILY AT  BEDTIME   Modified Medications    No medications on file   Discontinued Medications    No medications on file       Allergies[4]       REVIEW OF SYSTEMS:  Constitutional: Negative.    HEENT: Negative.    Respiratory: Negative.    Skin: Negative.    Neurological: Negative.    Psychiatric/Behavioral: Negative.  Musculoskeletal: Negative except for that mentioned in the HPI.    Gen: No acute distress, resting comfortably in bed  HEENT: Eyes clear, moist mucus membranes, hearing intact  Respiratory: No audible wheezing or stridor  Cardiovascular: Well Perfused peripherally, 2+ distal pulse  Abdomen: nondistended, no peritoneal signs     PHYSICAL EXAM:      Visual inspection of the right knee demonstrates normal contour without atrophy.   No previous incisions   There is no significant erythema or edema.    No significant joint effusion   Range of motion is full from 0-130 degrees of flexion   Able to straight leg raise   non tender to palpation  - medial joint line tenderness, - lateral joint line tenderness  - medial Gilbert's, - lateral Gilbert's  1A Lachman exam, stable posterior drawer  Stable to varus and valgus stress at both 0 and 30°  Patella tracks normally.  No J  "sign.  No apprehension.  Translation is approximately 2 quadrants and is equal to the contralateral side  Patellar eversion is similar to the contralateral side      Divya Cosme PA-C    Scribe Attestation      I,:   am acting as a scribe while in the presence of the attending physician.:       I,:   personally performed the services described in this documentation    as scribed in my presence.:               Portions of the record may have been created with voice recognition software.  Occasional wrong word or \"sound a like\" substitutions may have occurred due to the inherent limitations of voice recognition software.  Read the chart carefully and recognize, using context, where substitutions have occurred. All patient's questions were answered to their satisfaction.         [1]   Past Medical History:  Diagnosis Date    Allergic 1987    Pneumovax and Roses    Anxiety     Asthma     Atypical chest pain 06/11/2020    Burping 07/17/2018    Cat bite of hand 06/29/2021    Chronic fatigue 09/05/2018    Disease of thyroid gland     Dizziness     Fractures 1988    Left ulna fx    GERD (gastroesophageal reflux disease)     Hearing loss     Heart disease Age 3 … 1953    Heart Murmur and Rheumatic Fever    Heart murmur     Hernia 2014    Interested to see if size has changed and if this is the cause if melvin of my symptoms    Medicare annual wellness visit, subsequent 01/05/2021    Migraine     Peripheral vertigo     Plantar fasciitis 2018 [facility]    Had Cortesone injection and wore a walking boot  for 6ish weeks. Right foot.    Raynaud disease     S/P trigger finger release     Skin tag 2017 carm[facility]    PCP removed it.    Sleep apnea     Strain of left trapezius muscle 07/19/2019    Tinea corporis 1984    Was treated and had never returned.    TMJ dysfunction 2018 carmencita    Tonsillitis 1954    Varicella 1955    Mild case as a young child    Visual disturbance    [2]   Past Surgical History:  Procedure Laterality Date    CATARACT " EXTRACTION      COLON SURGERY      COLONOSCOPY  2002 & 2012    Negative    EYE SURGERY  2019    GLAUCOMA SURGERY      HALLUX VALGUS CORRECTION Right 06/19/2007    INCISION TENDON SHEATH HAND  05/23/2011    Release of A1 pulley    KNEE ARTHROSCOPY W/ MENISCAL REPAIR Left 2003    KNEE SURGERY  2005    TX ESOPHAGOGASTRODUODENOSCOPY TRANSORAL DIAGNOSTIC N/A 08/22/2018    Procedure: ESOPHAGOGASTRODUODENOSCOPY (EGD);  Surgeon: Luis Mohan MD;  Location: [facility];  Service: Gastroenterology    TX TENDON SHEATH INCISION Left 07/11/2019    Procedure: RELEASE TRIGGER FINGER left ring;  Surgeon: Tim Mercado MD;  Location: [facility];  Service: Orthopedics    TOENAIL EXCISION  2005 [facility]    Left  great and left pinky toes.    TONSILLECTOMY  1953    TRIGGER FINGER RELEASE      TUBAL LIGATION  1977    UPPER GASTROINTESTINAL ENDOSCOPY     [3]   Family History  Problem Relation Name Age of Onset    Lung cancer Mother Corrie STONE Basara     Cancer Mother Corrie Hart         Lung    Stomach cancer Father Ignacio Hart     Cancer Father Ignacio Hart         Stomach    No Known Problems Sister      No Known Problems Daughter      Other Maternal Grandmother Corrie Qi         Stroke syndrome    Stroke Maternal Grandmother Corrie Qi     Diabetes Maternal Grandmother Corrie Busby         Stroke    No Known Problems Maternal Grandfather      No Known Problems Paternal Grandmother      No Known Problems Paternal Grandfather      Cancer Brother Senthil Delgado         Respiratory Disease    No Known Problems Son      Other Maternal Aunt Kasandra Qi         Cardiomegaly    Diabetes Maternal Aunt Kasandra Busby         Severe diabetic    Cancer Maternal Aunt Kasandra Qi         Severe Diabetic    Heart disease Maternal Aunt Kasandra Qi         Enlarged Heart    No Known Problems Paternal Aunt      No Known Problems Paternal Aunt      No Known Problems Paternal Aunt      Diabetes Brother Senthil Gore          Respiratory Failure    Cancer Brother Senthil Delgado         Respiratory Disease   [4]   Allergies  Allergen Reactions    Pneumococcal Polysaccharide Vaccine Fever     Other reaction(s): Chills, fever and mottling  Category: Allergy;     Corrie Allergic Rhinitis, Headache, Nasal Congestion and Visual Disturbance    Timoptic [Timolol] Eye Swelling

## 2025-07-17 ENCOUNTER — TELEPHONE (OUTPATIENT)
Age: 75
End: 2025-07-17

## 2025-07-17 NOTE — TELEPHONE ENCOUNTER
Caller: Self    Doctor: Dr. Tripp    Reason for call: Patient calling about the brace she received, states it will not stay up. She tries to adjust it and as soon as she starts walking, it slide down. Looking for recommendations on what to do     Call back#: [phone]

## 2025-07-23 ENCOUNTER — TELEPHONE (OUTPATIENT)
Age: 75
End: 2025-07-23

## 2025-08-13 ENCOUNTER — TELEPHONE (OUTPATIENT)
Age: 75
End: 2025-08-13

## 2025-08-18 DIAGNOSIS — F41.9 ANXIETY: ICD-10-CM

## 2025-08-18 DIAGNOSIS — E78.5 HYPERLIPIDEMIA, UNSPECIFIED HYPERLIPIDEMIA TYPE: ICD-10-CM

## 2025-08-20 RX ORDER — CITALOPRAM HYDROBROMIDE 40 MG/1
40 TABLET ORAL DAILY
Qty: 90 TABLET | Refills: 1 | Status: SHIPPED | OUTPATIENT
Start: 2025-08-20

## 2025-08-20 RX ORDER — PRAVASTATIN SODIUM 10 MG
10 TABLET ORAL
Qty: 90 TABLET | Refills: 3 | Status: SHIPPED | OUTPATIENT
Start: 2025-08-20

## (undated) DEVICE — SUT PROLENE 4-0 PC-3 18 IN 8634G

## (undated) DEVICE — SYRINGE 50ML LL

## (undated) DEVICE — 1200CC GUARDIAN II: Brand: GUARDIAN

## (undated) DEVICE — TUBING SUCTION 5MM X 12 FT

## (undated) DEVICE — STERILE BETHLEHEM PLASTIC HAND: Brand: CARDINAL HEALTH

## (undated) DEVICE — GLOVE INDICATOR PI UNDERGLOVE SZ 8 BLUE

## (undated) DEVICE — INTENDED FOR TISSUE SEPARATION, AND OTHER PROCEDURES THAT REQUIRE A SHARP SURGICAL BLADE TO PUNCTURE OR CUT.: Brand: BARD-PARKER SAFETY BLADES SIZE 15, STERILE

## (undated) DEVICE — STERI DRAPE 1000 NON-STERILE ROLL

## (undated) DEVICE — SYRINGE 30ML LL

## (undated) DEVICE — GLOVE SRG BIOGEL 7.5

## (undated) DEVICE — CHLORAPREP HI-LITE 26ML ORANGE

## (undated) DEVICE — SPONGE PVP SCRUB WING STERILE